# Patient Record
Sex: MALE | Race: WHITE | NOT HISPANIC OR LATINO | Employment: FULL TIME | URBAN - METROPOLITAN AREA
[De-identification: names, ages, dates, MRNs, and addresses within clinical notes are randomized per-mention and may not be internally consistent; named-entity substitution may affect disease eponyms.]

---

## 2019-02-11 ENCOUNTER — OFFICE VISIT (OUTPATIENT)
Dept: FAMILY MEDICINE | Facility: CLINIC | Age: 39
End: 2019-02-11
Payer: COMMERCIAL

## 2019-02-11 VITALS
SYSTOLIC BLOOD PRESSURE: 142 MMHG | DIASTOLIC BLOOD PRESSURE: 90 MMHG | BODY MASS INDEX: 36.27 KG/M2 | WEIGHT: 259.06 LBS | HEIGHT: 71 IN | TEMPERATURE: 98 F | HEART RATE: 74 BPM

## 2019-02-11 DIAGNOSIS — J30.89 SEASONAL AND PERENNIAL ALLERGIC RHINITIS: ICD-10-CM

## 2019-02-11 DIAGNOSIS — I10 ESSENTIAL HYPERTENSION: Primary | ICD-10-CM

## 2019-02-11 DIAGNOSIS — E66.01 CLASS 2 SEVERE OBESITY DUE TO EXCESS CALORIES WITH SERIOUS COMORBIDITY IN ADULT, UNSPECIFIED BMI: ICD-10-CM

## 2019-02-11 DIAGNOSIS — J30.2 SEASONAL AND PERENNIAL ALLERGIC RHINITIS: ICD-10-CM

## 2019-02-11 DIAGNOSIS — J45.990 EXERCISE-INDUCED ASTHMA: ICD-10-CM

## 2019-02-11 DIAGNOSIS — E78.5 DYSLIPIDEMIA: ICD-10-CM

## 2019-02-11 PROCEDURE — 99999 PR PBB SHADOW E&M-NEW PATIENT-LVL III: CPT | Mod: PBBFAC,,, | Performed by: INTERNAL MEDICINE

## 2019-02-11 PROCEDURE — 99204 OFFICE O/P NEW MOD 45 MIN: CPT | Mod: S$GLB,,, | Performed by: INTERNAL MEDICINE

## 2019-02-11 PROCEDURE — 99999 PR PBB SHADOW E&M-NEW PATIENT-LVL III: ICD-10-PCS | Mod: PBBFAC,,, | Performed by: INTERNAL MEDICINE

## 2019-02-11 PROCEDURE — 99204 PR OFFICE/OUTPT VISIT, NEW, LEVL IV, 45-59 MIN: ICD-10-PCS | Mod: S$GLB,,, | Performed by: INTERNAL MEDICINE

## 2019-02-11 RX ORDER — ACETAMINOPHEN 500 MG
500 TABLET ORAL EVERY 6 HOURS PRN
COMMUNITY
End: 2019-03-27

## 2019-02-11 RX ORDER — DIPHENHYDRAMINE HCL 25 MG
25 CAPSULE ORAL EVERY 6 HOURS PRN
COMMUNITY
End: 2019-03-27

## 2019-02-11 RX ORDER — LOSARTAN POTASSIUM 100 MG/1
1 TABLET ORAL DAILY
Refills: 0 | COMMUNITY
Start: 2019-01-12 | End: 2019-02-18 | Stop reason: SDUPTHER

## 2019-02-11 RX ORDER — ALBUTEROL SULFATE 90 UG/1
2 POWDER, METERED RESPIRATORY (INHALATION) DAILY PRN
COMMUNITY
Start: 2018-12-31 | End: 2020-04-03 | Stop reason: SDUPTHER

## 2019-02-11 NOTE — PATIENT INSTRUCTIONS
Essential hypertension. Maintain < 2 Gm Na a day diet, and monitor BP at home; keep a log. Cont losartan;   to check w pharmacist regarding refill if lot is acceptable.   -     Comprehensive metabolic panel; Future; Expected date: 02/11/2019  -     CBC auto differential; Future; Expected date: 02/11/2019  -     T4, free; Future; Expected date: 02/11/2019  -     TSH; Future; Expected date: 02/11/2019  -     HEMOGLOBIN A1C; Future; Expected date: 02/11/2019  -     Lipid panel; Future; Expected date: 02/11/2019  -     URINALYSIS; Future; Expected date: 02/11/2019  -     Magnesium; Future; Expected date: 02/11/2019    Dyslipidemia. Maintain low fat high fiber diet, exercise regularly.  -     Comprehensive metabolic panel; Future; Expected date: 02/11/2019  -     Lipid panel; Future; Expected date: 02/11/2019    Class 2 severe obesity due to excess calories with serious comorbidity in adult, unspecified BMI.   Caloric restriction w regular exercise and weight reduction.  -     Comprehensive metabolic panel; Future; Expected date: 02/11/2019  -     T4, free; Future; Expected date: 02/11/2019  -     TSH; Future; Expected date: 02/11/2019  -     HEMOGLOBIN A1C; Future; Expected date: 02/11/2019  -     Lipid panel; Future; Expected date: 02/11/2019    Exercise-induced asthma; 2 puffs of rescue inhaler before exercise daily as needed.    Seasonal and perennial allergic rhinitis; claritin 10 mg a day as needed for congestion; can't tolerate steroid nasal sprays due to nosebleeds.   Simply saline as needed for nasal irrigation.

## 2019-02-11 NOTE — PROGRESS NOTES
"Subjective:       Patient ID: Chad Chen is a 39 y.o. male.    Chief Complaint: No chief complaint on file.    HPI Here to get est [facility] as his PCP.  PMH/surgical hx delineated and noted. SMH/FMH also delineated and noted. ROS obtained at length prior to physical being performed.   HTN: BP at home runs 120/80 avr; manually 142/90. On losartan for BP.  Dyslipidemia; HDL runs in low 30's. On low fat high fiber diet.   Obesity; BMI 36.13. Exercises 4x a week w goal 5x a week. Had been at 7x a week. 45-60 min each time.   Total time: 220-305 pm; >50% time spent on discussion, counseling, and review. Labs ordered for f/u; meds reviewed as well. Losartan renewed.     Review of Systems   Constitutional: Negative for appetite change and unexpected weight change.   HENT: Negative for congestion, postnasal drip, rhinorrhea and sinus pressure.         Seasonal allergies, / perennial allergies   Eyes: Negative for discharge and itching.   Respiratory: Negative for cough, chest tightness, shortness of breath and wheezing.    Cardiovascular: Negative for chest pain, palpitations and leg swelling.   Gastrointestinal: Negative for abdominal pain, blood in stool, constipation, diarrhea, nausea and vomiting.   Endocrine: Negative for polydipsia, polyphagia and polyuria.   Genitourinary: Negative for dysuria and hematuria.   Musculoskeletal: Negative for arthralgias and myalgias.   Skin: Negative for rash.   Allergic/Immunologic: Positive for environmental allergies. Negative for food allergies.   Neurological: Negative for tremors, seizures and headaches.   Hematological: Negative for adenopathy. Does not bruise/bleed easily.   Psychiatric/Behavioral:        Denies anxiety or depression.       Objective:      Vitals:    02/11/19 1345   BP: (!) 142/90   Pulse: 74   Temp: 98.4 °F (36.9 °C)   Weight: 117.5 kg (259 lb 0.7 oz)   Height: 5' 11" (1.803 m)     Body mass index is 36.13 kg/m².    Physical Exam   Constitutional: He is oriented to " person, place, and time. He appears well-developed and well-nourished.   HENT:   Head: Normocephalic and atraumatic.   Throat pink and moist; TM's pink; NM swollen, inflamed w clear to white mucus.   Eyes: EOM are normal.   Neck: Normal range of motion. Neck supple. No thyromegaly present.   No carotid bruits heard.   Cardiovascular: Normal rate, regular rhythm and normal heart sounds. Exam reveals no gallop.   No murmur heard.  Pulmonary/Chest: Effort normal and breath sounds normal. No respiratory distress. He has no wheezes. He has no rales.   Abdominal: Soft. Bowel sounds are normal. He exhibits no distension. There is no tenderness. There is no rebound and no guarding.   Musculoskeletal: Normal range of motion. He exhibits no edema.   Lymphadenopathy:     He has no cervical adenopathy.   Neurological: He is alert and oriented to person, place, and time.   Moves all 4 extremities fine.   Skin: No rash noted.   Psychiatric: He has a normal mood and affect. His behavior is normal. Thought content normal.   Vitals reviewed.      Assessment:       1. Essential hypertension    2. Dyslipidemia    3. Class 2 severe obesity due to excess calories with serious comorbidity in adult, unspecified BMI    4. Exercise-induced asthma    5. Seasonal and perennial allergic rhinitis        Plan:       Essential hypertension. Maintain < 2 Gm Na a day diet, and monitor BP at home; keep a log. Cont losartan;   to check w pharmacist regarding refill if lot is acceptable.   -     Comprehensive metabolic panel; Future; Expected date: 02/11/2019  -     CBC auto differential; Future; Expected date: 02/11/2019  -     T4, free; Future; Expected date: 02/11/2019  -     TSH; Future; Expected date: 02/11/2019  -     HEMOGLOBIN A1C; Future; Expected date: 02/11/2019  -     Lipid panel; Future; Expected date: 02/11/2019  -     URINALYSIS; Future; Expected date: 02/11/2019  -     Magnesium; Future; Expected date: 02/11/2019    Dyslipidemia.  Maintain low fat high fiber diet, exercise regularly.  -     Comprehensive metabolic panel; Future; Expected date: 02/11/2019  -     Lipid panel; Future; Expected date: 02/11/2019    Class 2 severe obesity due to excess calories with serious comorbidity in adult, unspecified BMI.   Caloric restriction w regular exercise and weight reduction.  -     Comprehensive metabolic panel; Future; Expected date: 02/11/2019  -     T4, free; Future; Expected date: 02/11/2019  -     TSH; Future; Expected date: 02/11/2019  -     HEMOGLOBIN A1C; Future; Expected date: 02/11/2019  -     Lipid panel; Future; Expected date: 02/11/2019    Exercise-induced asthma; 2 puffs of rescue inhaler before exercise daily as needed.    Seasonal and perennial allergic rhinitis; claritin 10 mg a day as needed for congestion; can't tolerate steroid nasal sprays due to nosebleeds.   Simply saline as needed for nasal irrigation.

## 2019-02-18 NOTE — TELEPHONE ENCOUNTER
----- Message from Jena Espinoza sent at 2/18/2019 11:31 AM CST -----  Contact: self  Type:  RX Refill Request    Who Called:  self  Refill or New Rx:  new  RX Name and Strength:  losartan (COZAAR) 100 MG tablet  How is the patient currently taking it? (ex. 1XDay):  1Xday  Is this a 30 day or 90 day RX:  90  Preferred Pharmacy with phone number:  [facility]  Local or Mail Order:  local  Ordering Provider:  Dr Napier  [facility] Call Back Number:  [phone]  Additional Information:  Patient states pharmacy never received request for medication. Please call patient. Thanks!   [facility] [address] [place] [address] [address] [place] [address] AT [facility]  [address] [place] [id number]  Phone: [phone] Fax: [phone]

## 2019-02-20 RX ORDER — LOSARTAN POTASSIUM 100 MG/1
100 TABLET ORAL DAILY
Qty: 90 TABLET | Refills: 1 | Status: SHIPPED | OUTPATIENT
Start: 2019-02-20 | End: 2019-07-23 | Stop reason: SDUPTHER

## 2019-02-20 NOTE — TELEPHONE ENCOUNTER
Please ask patient that when he gets his Cozaar refilled to check with pharmacy to make sure that he does not get a lot that has been recalled; he will also need to do this when he gets it refilled.

## 2019-03-18 ENCOUNTER — PATIENT MESSAGE (OUTPATIENT)
Dept: FAMILY MEDICINE | Facility: CLINIC | Age: 39
End: 2019-03-18

## 2019-03-20 ENCOUNTER — LAB VISIT (OUTPATIENT)
Dept: LAB | Facility: HOSPITAL | Age: 39
End: 2019-03-20
Attending: INTERNAL MEDICINE
Payer: COMMERCIAL

## 2019-03-20 DIAGNOSIS — E66.01 CLASS 2 SEVERE OBESITY DUE TO EXCESS CALORIES WITH SERIOUS COMORBIDITY IN ADULT, UNSPECIFIED BMI: ICD-10-CM

## 2019-03-20 DIAGNOSIS — I10 ESSENTIAL HYPERTENSION: ICD-10-CM

## 2019-03-20 DIAGNOSIS — E78.5 DYSLIPIDEMIA: ICD-10-CM

## 2019-03-20 LAB
ALBUMIN SERPL BCP-MCNC: 3.9 G/DL
ALP SERPL-CCNC: 89 U/L
ALT SERPL W/O P-5'-P-CCNC: 35 U/L
ANION GAP SERPL CALC-SCNC: 9 MMOL/L
AST SERPL-CCNC: 20 U/L
BASOPHILS # BLD AUTO: 0.04 K/UL
BASOPHILS NFR BLD: 0.5 %
BILIRUB SERPL-MCNC: 0.8 MG/DL
BUN SERPL-MCNC: 15 MG/DL
CALCIUM SERPL-MCNC: 9.4 MG/DL
CHLORIDE SERPL-SCNC: 106 MMOL/L
CHOLEST SERPL-MCNC: 181 MG/DL
CHOLEST/HDLC SERPL: 5.5 {RATIO}
CO2 SERPL-SCNC: 27 MMOL/L
CREAT SERPL-MCNC: 0.8 MG/DL
DIFFERENTIAL METHOD: NORMAL
EOSINOPHIL # BLD AUTO: 0.2 K/UL
EOSINOPHIL NFR BLD: 2.6 %
ERYTHROCYTE [DISTWIDTH] IN BLOOD BY AUTOMATED COUNT: 13.4 %
EST. GFR  (AFRICAN AMERICAN): >60 ML/MIN/1.73 M^2
EST. GFR  (NON AFRICAN AMERICAN): >60 ML/MIN/1.73 M^2
ESTIMATED AVG GLUCOSE: 103 MG/DL
GLUCOSE SERPL-MCNC: 95 MG/DL
HBA1C MFR BLD HPLC: 5.2 %
HCT VFR BLD AUTO: 44.5 %
HDLC SERPL-MCNC: 33 MG/DL
HDLC SERPL: 18.2 %
HGB BLD-MCNC: 14.7 G/DL
IMM GRANULOCYTES # BLD AUTO: 0.02 K/UL
IMM GRANULOCYTES NFR BLD AUTO: 0.3 %
LDLC SERPL CALC-MCNC: 119.4 MG/DL
LYMPHOCYTES # BLD AUTO: 2.7 K/UL
LYMPHOCYTES NFR BLD: 36 %
MAGNESIUM SERPL-MCNC: 2 MG/DL
MCH RBC QN AUTO: 29.1 PG
MCHC RBC AUTO-ENTMCNC: 33 G/DL
MCV RBC AUTO: 88 FL
MONOCYTES # BLD AUTO: 0.5 K/UL
MONOCYTES NFR BLD: 7 %
NEUTROPHILS # BLD AUTO: 4 K/UL
NEUTROPHILS NFR BLD: 53.6 %
NONHDLC SERPL-MCNC: 148 MG/DL
NRBC BLD-RTO: 0 /100 WBC
PLATELET # BLD AUTO: 266 K/UL
PMV BLD AUTO: 9.9 FL
POTASSIUM SERPL-SCNC: 3.7 MMOL/L
PROT SERPL-MCNC: 6.8 G/DL
RBC # BLD AUTO: 5.05 M/UL
SODIUM SERPL-SCNC: 142 MMOL/L
T4 FREE SERPL-MCNC: 0.95 NG/DL
TRIGL SERPL-MCNC: 143 MG/DL
TSH SERPL DL<=0.005 MIU/L-ACNC: 1.18 UIU/ML
WBC # BLD AUTO: 7.39 K/UL

## 2019-03-20 PROCEDURE — 83036 HEMOGLOBIN GLYCOSYLATED A1C: CPT

## 2019-03-20 PROCEDURE — 84443 ASSAY THYROID STIM HORMONE: CPT

## 2019-03-20 PROCEDURE — 80061 LIPID PANEL: CPT

## 2019-03-20 PROCEDURE — 85025 COMPLETE CBC W/AUTO DIFF WBC: CPT

## 2019-03-20 PROCEDURE — 83735 ASSAY OF MAGNESIUM: CPT

## 2019-03-20 PROCEDURE — 80053 COMPREHEN METABOLIC PANEL: CPT

## 2019-03-20 PROCEDURE — 36415 COLL VENOUS BLD VENIPUNCTURE: CPT | Mod: PN

## 2019-03-20 PROCEDURE — 84439 ASSAY OF FREE THYROXINE: CPT

## 2019-03-27 ENCOUNTER — OFFICE VISIT (OUTPATIENT)
Dept: FAMILY MEDICINE | Facility: CLINIC | Age: 39
End: 2019-03-27
Payer: COMMERCIAL

## 2019-03-27 ENCOUNTER — PATIENT MESSAGE (OUTPATIENT)
Dept: FAMILY MEDICINE | Facility: CLINIC | Age: 39
End: 2019-03-27

## 2019-03-27 VITALS
RESPIRATION RATE: 16 BRPM | HEART RATE: 55 BPM | SYSTOLIC BLOOD PRESSURE: 150 MMHG | HEIGHT: 71 IN | BODY MASS INDEX: 35.51 KG/M2 | WEIGHT: 253.63 LBS | DIASTOLIC BLOOD PRESSURE: 90 MMHG | TEMPERATURE: 98 F | OXYGEN SATURATION: 97 %

## 2019-03-27 DIAGNOSIS — I10 ESSENTIAL HYPERTENSION: Primary | ICD-10-CM

## 2019-03-27 DIAGNOSIS — E78.5 DYSLIPIDEMIA: ICD-10-CM

## 2019-03-27 DIAGNOSIS — E66.01 CLASS 2 SEVERE OBESITY DUE TO EXCESS CALORIES WITH SERIOUS COMORBIDITY AND BODY MASS INDEX (BMI) OF 35.0 TO 35.9 IN ADULT: ICD-10-CM

## 2019-03-27 DIAGNOSIS — I10 WHITE COAT SYNDROME WITH DIAGNOSIS OF HYPERTENSION: ICD-10-CM

## 2019-03-27 DIAGNOSIS — J45.990 EXERCISE-INDUCED ASTHMA: ICD-10-CM

## 2019-03-27 DIAGNOSIS — E78.00 PURE HYPERCHOLESTEROLEMIA: ICD-10-CM

## 2019-03-27 PROCEDURE — 99999 PR PBB SHADOW E&M-EST. PATIENT-LVL III: CPT | Mod: PBBFAC,,, | Performed by: INTERNAL MEDICINE

## 2019-03-27 PROCEDURE — 99999 PR PBB SHADOW E&M-EST. PATIENT-LVL III: ICD-10-PCS | Mod: PBBFAC,,, | Performed by: INTERNAL MEDICINE

## 2019-03-27 PROCEDURE — 99213 PR OFFICE/OUTPT VISIT, EST, LEVL III, 20-29 MIN: ICD-10-PCS | Mod: S$GLB,,, | Performed by: INTERNAL MEDICINE

## 2019-03-27 PROCEDURE — 99213 OFFICE O/P EST LOW 20 MIN: CPT | Mod: S$GLB,,, | Performed by: INTERNAL MEDICINE

## 2019-03-27 NOTE — PROGRESS NOTES
"Subjective:       Patient ID: Chad Chen is a 39 y.o. male.    Chief Complaint: Hypertension (assessment of blood pressure and routine 6 week follow up )    HPI  Here for reassessment; and to go over his labs.  HTN: BP avr at home 120/80 vishal; here 150/90 manually. 147/90 by me on repeat; suspect white coat syn as well. Needs to decrease caffeine intake.   Dyslipidemia;hypercholesterolemia; on low fat high fiber diet. Regular exercise will help as well. Doing 5-7x a week. Anxiety if he doesn't. Counts steps; easily 10.000 a day steps. Will work on diet a little better.  Obesity; has lost around 6 lbs of note. BMI 35.38  EIA; has proair; uses 1x a week. Prolong exertion only time needed.    Review of Systems   Constitutional: Negative for activity change and unexpected weight change.   HENT: Negative for congestion, hearing loss, rhinorrhea and trouble swallowing.    Eyes: Negative for discharge and visual disturbance.   Respiratory: Negative for chest tightness and wheezing.    Cardiovascular: Negative for chest pain and palpitations.   Gastrointestinal: Negative for blood in stool, constipation, diarrhea and vomiting.   Endocrine: Negative for polydipsia and polyuria.   Genitourinary: Negative for difficulty urinating, hematuria and urgency.   Musculoskeletal: Positive for arthralgias and neck pain. Negative for joint swelling.   Skin: Negative for rash.   Neurological: Negative for weakness and headaches.   Psychiatric/Behavioral: Negative for confusion and dysphoric mood.        Denies anxiety or depression.       Objective:      Vitals:    03/27/19 0912   BP: (!) 150/90   Pulse: (!) 55   Resp: 16   Temp: 97.7 °F (36.5 °C)   TempSrc: Oral   SpO2: 97%   Weight: 115.1 kg (253 lb 10.2 oz)   Height: 5' 11" (1.803 m)     Body mass index is 35.38 kg/m².    Physical Exam   Constitutional: He is oriented to person, place, and time. He appears well-developed and well-nourished.   HENT:   Head: Normocephalic and atraumatic. "   Eyes: EOM are normal.   Neck: Normal range of motion. Neck supple. No thyromegaly present.   No carotid bruits   Cardiovascular: Normal rate, regular rhythm and normal heart sounds. Exam reveals no gallop.   No murmur heard.  Pulmonary/Chest: Effort normal and breath sounds normal. No respiratory distress. He has no wheezes. He has no rales.   Abdominal: Soft. Bowel sounds are normal. He exhibits no distension. There is no tenderness. There is no rebound and no guarding.   Musculoskeletal: Normal range of motion. He exhibits no edema.   Lymphadenopathy:     He has no cervical adenopathy.   Neurological: He is alert and oriented to person, place, and time.   Moves all 4 extremities fine.   Skin: No rash noted.   Psychiatric: He has a normal mood and affect. His behavior is normal. Thought content normal.   Vitals reviewed.      Assessment:       1. Essential hypertension    2. White coat syndrome with diagnosis of hypertension    3. Pure hypercholesterolemia    4. Dyslipidemia    5. Exercise-induced asthma    6. Class 2 severe obesity due to excess calories with serious comorbidity and body mass index (BMI) of 35.0 to 35.9 in adult        Plan:     Essential hypertension.Maintain < 2 Gm Na a day diet, and monitor BP at home; keep a log. Cont losartan.   -     Comprehensive metabolic panel; Future; Expected date: 03/27/2019  -     Lipid panel; Future; Expected date: 03/27/2019  -     Magnesium; Future; Expected date: 03/27/2019    White coat syndrome with diagnosis of hypertension    Pure hypercholesterolemia.Maintain low fat high fiber diet, exercise regularly. On omeg-3 oil; weight reduction as well.  -     Comprehensive metabolic panel; Future; Expected date: 03/27/2019  -     Lipid panel; Future; Expected date: 03/27/2019    Dyslipidemia; as above.   -     Comprehensive metabolic panel; Future; Expected date: 03/27/2019  -     Lipid panel; Future; Expected date: 03/27/2019    Exercise induced asthma; use  albuterol rescue as needed and before any heavy exertion anticipated.     Class 2 severe obesity due to excess calories with serious comorbidity and body mass index (BMI) of 35.0 to 35.9 in adult. Caloric restriction w regular exercise and weight reduction.

## 2019-03-27 NOTE — PATIENT INSTRUCTIONS
Essential hypertension.Maintain < 2 Gm Na a day diet, and monitor BP at home; keep a log. Cont losartan.   -     Comprehensive metabolic panel; Future; Expected date: 03/27/2019  -     Lipid panel; Future; Expected date: 03/27/2019  -     Magnesium; Future; Expected date: 03/27/2019    White coat syndrome with diagnosis of hypertension    Pure hypercholesterolemia.Maintain low fat high fiber diet, exercise regularly. On omeg-3 oil; weight reduction as well.  -     Comprehensive metabolic panel; Future; Expected date: 03/27/2019  -     Lipid panel; Future; Expected date: 03/27/2019    Dyslipidemia; as above.   -     Comprehensive metabolic panel; Future; Expected date: 03/27/2019  -     Lipid panel; Future; Expected date: 03/27/2019    Class 2 severe obesity due to excess calories with serious comorbidity and body mass index (BMI) of 35.0 to 35.9 in adult.Caloric restriction w regular exercise and weight reduction.    Exercise induced asthma; use albuterol rescue as needed and before any heavy exertion anticipated.

## 2019-03-28 NOTE — TELEPHONE ENCOUNTER
Notify patient I have received his blood pressure log and shows the most part adequate blood pressure control.  Please keep his follow-up for reassessment and further evaluation

## 2019-05-08 ENCOUNTER — PATIENT MESSAGE (OUTPATIENT)
Dept: FAMILY MEDICINE | Facility: CLINIC | Age: 39
End: 2019-05-08

## 2019-05-11 ENCOUNTER — TELEPHONE (OUTPATIENT)
Dept: FAMILY MEDICINE | Facility: CLINIC | Age: 39
End: 2019-05-11

## 2019-05-11 NOTE — TELEPHONE ENCOUNTER
Discussed case with patient by phone.  Reiterated that at the time of my physical exam I did not hear any evidence of a cardiac murmur.  He does have under past medical history though cardiac murmur listed. He states that he has not had an echocardiogram though; he has no hx of chest pain, SOB, or palpitations.  Offered sending him to see a cardiologist if desired however felt he could wait on this and he concurred; and prefer for 1st to review his old records; please try to obtain a copy of his old records from his PCP prior for review.

## 2019-07-11 ENCOUNTER — TELEPHONE (OUTPATIENT)
Dept: FAMILY MEDICINE | Facility: CLINIC | Age: 39
End: 2019-07-11

## 2019-07-11 NOTE — TELEPHONE ENCOUNTER
----- Message from Aaliyah Ramirez sent at 7/11/2019  4:26 PM CDT -----  Contact: PT   Pt calling in to schedule his mid year bp check , next available was showing September .   Pt would like to be seen before that.     Pt can be reached at [phone]    Thanks

## 2019-07-18 ENCOUNTER — LAB VISIT (OUTPATIENT)
Dept: LAB | Facility: HOSPITAL | Age: 39
End: 2019-07-18
Attending: INTERNAL MEDICINE
Payer: COMMERCIAL

## 2019-07-18 DIAGNOSIS — E78.5 DYSLIPIDEMIA: ICD-10-CM

## 2019-07-18 DIAGNOSIS — E78.00 PURE HYPERCHOLESTEROLEMIA: ICD-10-CM

## 2019-07-18 DIAGNOSIS — I10 ESSENTIAL HYPERTENSION: ICD-10-CM

## 2019-07-18 LAB
ALBUMIN SERPL BCP-MCNC: 3.8 G/DL (ref 3.5–5.2)
ALP SERPL-CCNC: 94 U/L (ref 55–135)
ALT SERPL W/O P-5'-P-CCNC: 30 U/L (ref 10–44)
ANION GAP SERPL CALC-SCNC: 10 MMOL/L (ref 8–16)
AST SERPL-CCNC: 19 U/L (ref 10–40)
BILIRUB SERPL-MCNC: 0.7 MG/DL (ref 0.1–1)
BUN SERPL-MCNC: 16 MG/DL (ref 6–20)
CALCIUM SERPL-MCNC: 9.3 MG/DL (ref 8.7–10.5)
CHLORIDE SERPL-SCNC: 105 MMOL/L (ref 95–110)
CHOLEST SERPL-MCNC: 191 MG/DL (ref 120–199)
CHOLEST/HDLC SERPL: 4.8 {RATIO} (ref 2–5)
CO2 SERPL-SCNC: 28 MMOL/L (ref 23–29)
CREAT SERPL-MCNC: 0.9 MG/DL (ref 0.5–1.4)
EST. GFR  (AFRICAN AMERICAN): >60 ML/MIN/1.73 M^2
EST. GFR  (NON AFRICAN AMERICAN): >60 ML/MIN/1.73 M^2
GLUCOSE SERPL-MCNC: 89 MG/DL (ref 70–110)
HDLC SERPL-MCNC: 40 MG/DL (ref 40–75)
HDLC SERPL: 20.9 % (ref 20–50)
LDLC SERPL CALC-MCNC: 111 MG/DL (ref 63–159)
MAGNESIUM SERPL-MCNC: 2 MG/DL (ref 1.6–2.6)
NONHDLC SERPL-MCNC: 151 MG/DL
POTASSIUM SERPL-SCNC: 3.5 MMOL/L (ref 3.5–5.1)
PROT SERPL-MCNC: 6.9 G/DL (ref 6–8.4)
SODIUM SERPL-SCNC: 143 MMOL/L (ref 136–145)
TRIGL SERPL-MCNC: 200 MG/DL (ref 30–150)

## 2019-07-18 PROCEDURE — 36415 COLL VENOUS BLD VENIPUNCTURE: CPT | Mod: PN

## 2019-07-18 PROCEDURE — 83735 ASSAY OF MAGNESIUM: CPT

## 2019-07-18 PROCEDURE — 80061 LIPID PANEL: CPT

## 2019-07-18 PROCEDURE — 80053 COMPREHEN METABOLIC PANEL: CPT

## 2019-07-23 ENCOUNTER — OFFICE VISIT (OUTPATIENT)
Dept: FAMILY MEDICINE | Facility: CLINIC | Age: 39
End: 2019-07-23
Payer: COMMERCIAL

## 2019-07-23 VITALS
SYSTOLIC BLOOD PRESSURE: 150 MMHG | TEMPERATURE: 98 F | HEART RATE: 60 BPM | DIASTOLIC BLOOD PRESSURE: 90 MMHG | BODY MASS INDEX: 36.96 KG/M2 | WEIGHT: 264 LBS | HEIGHT: 71 IN | OXYGEN SATURATION: 96 %

## 2019-07-23 DIAGNOSIS — E78.2 MIXED HYPERLIPIDEMIA: ICD-10-CM

## 2019-07-23 DIAGNOSIS — E78.5 DYSLIPIDEMIA: ICD-10-CM

## 2019-07-23 DIAGNOSIS — J45.20 MILD INTERMITTENT ASTHMA, UNSPECIFIED WHETHER COMPLICATED: ICD-10-CM

## 2019-07-23 DIAGNOSIS — I10 ESSENTIAL HYPERTENSION: Primary | ICD-10-CM

## 2019-07-23 DIAGNOSIS — I10 WHITE COAT SYNDROME WITH DIAGNOSIS OF HYPERTENSION: ICD-10-CM

## 2019-07-23 DIAGNOSIS — E66.01 CLASS 2 SEVERE OBESITY DUE TO EXCESS CALORIES WITH SERIOUS COMORBIDITY AND BODY MASS INDEX (BMI) OF 36.0 TO 36.9 IN ADULT: ICD-10-CM

## 2019-07-23 PROCEDURE — 99214 PR OFFICE/OUTPT VISIT, EST, LEVL IV, 30-39 MIN: ICD-10-PCS | Mod: S$GLB,,, | Performed by: INTERNAL MEDICINE

## 2019-07-23 PROCEDURE — 99999 PR PBB SHADOW E&M-EST. PATIENT-LVL III: ICD-10-PCS | Mod: PBBFAC,,, | Performed by: INTERNAL MEDICINE

## 2019-07-23 PROCEDURE — 99214 OFFICE O/P EST MOD 30 MIN: CPT | Mod: S$GLB,,, | Performed by: INTERNAL MEDICINE

## 2019-07-23 PROCEDURE — 99999 PR PBB SHADOW E&M-EST. PATIENT-LVL III: CPT | Mod: PBBFAC,,, | Performed by: INTERNAL MEDICINE

## 2019-07-23 RX ORDER — POTASSIUM CHLORIDE 750 MG/1
CAPSULE, EXTENDED RELEASE ORAL
Qty: 30 CAPSULE | Refills: 5 | Status: SHIPPED | OUTPATIENT
Start: 2019-07-23 | End: 2019-11-19

## 2019-07-23 RX ORDER — POTASSIUM CHLORIDE 750 MG/1
10 CAPSULE, EXTENDED RELEASE ORAL DAILY
Qty: 90 CAPSULE | Refills: 1 | Status: SHIPPED | OUTPATIENT
Start: 2019-07-23 | End: 2019-07-23

## 2019-07-23 RX ORDER — MELOXICAM 15 MG/1
TABLET ORAL
Refills: 0 | COMMUNITY
Start: 2019-07-05 | End: 2019-08-23

## 2019-07-23 RX ORDER — LOSARTAN POTASSIUM 100 MG/1
100 TABLET ORAL DAILY
Qty: 90 TABLET | Refills: 1 | Status: SHIPPED | OUTPATIENT
Start: 2019-07-23 | End: 2019-07-23

## 2019-07-23 RX ORDER — LOSARTAN POTASSIUM 100 MG/1
TABLET ORAL
Qty: 30 TABLET | Refills: 5 | Status: SHIPPED | OUTPATIENT
Start: 2019-07-23 | End: 2019-11-19 | Stop reason: ALTCHOICE

## 2019-07-23 NOTE — PATIENT INSTRUCTIONS
Essential hypertension.Maintain < 2 Gm Na a day diet, and monitor BP at home; keep a log. Cont losartan. Renewed at #30 w 5 refill. Limit caffeine intake. On mobic 15 mg a day for 4 weeks per ortho for elbow trauma; soon to stop.   -     potassium chloride (MICRO-K) 10 MEQ CpSR; Take 1 capsule (10 mEq total) by mouth once daily.  Dispense: 30 capsule; Refill: 5  -     Basic metabolic panel; Future; Expected date: 07/23/2019    White coat syndrome with diagnosis of hypertension; limit caffeine as well.     Mixed hyperlipidemia.Maintain low fat high fiber diet, exercise regularly. Weight reduction.   -     Lipid panel; Future; Expected date: 07/23/2019    Dyslipidemia.Maintain low fat high fiber diet, exercise regularly.  -     Lipid panel; Future; Expected date: 07/23/2019    Mild intermittent asthma, unspecified whether complicated; has rescue inhaler; rarely used.     Class 2 severe obesity due to excess calories with serious comorbidity and body mass index (BMI) of 36.0 to 36.9 in adult.  Caloric restriction w regular exercise and weight reduction.

## 2019-07-23 NOTE — PROGRESS NOTES
"Subjective:       Patient ID: Chad Chen is a 39 y.o. male.    Chief Complaint: Hypertension (Follow up on blood pressure )    HPI  Here today for reassessment, and to go over his labs.     Review of Systems   Constitutional: Negative for appetite change and unexpected weight change.   HENT: Negative for congestion, postnasal drip and rhinorrhea.         Seasonal allergies   Respiratory: Negative for cough, chest tightness, shortness of breath and wheezing.    Cardiovascular: Negative for chest pain, palpitations and leg swelling.   Gastrointestinal: Negative for abdominal pain, constipation, nausea and vomiting.   Endocrine: Negative for polydipsia, polyphagia and polyuria.   Genitourinary: Negative for dysuria and urgency.   Musculoskeletal: Negative for arthralgias, myalgias and neck pain.   Skin: Negative for rash.   Allergic/Immunologic: Positive for environmental allergies. Negative for food allergies.        Doing well today   Neurological: Negative for tremors, syncope and headaches.   Hematological: Negative for adenopathy. Does not bruise/bleed easily.   Psychiatric/Behavioral:        Denies anxiety or depression.       Objective:      Vitals:    07/23/19 0846   BP: (!) 150/90   BP Location: Left arm   Patient Position: Sitting   BP Method: Large (Manual)   Pulse: 60   Temp: 98.3 °F (36.8 °C)   TempSrc: Oral   SpO2: 96%   Weight: 119.7 kg (264 lb)   Height: 5' 11" (1.803 m)     Body mass index is 36.82 kg/m².    Physical Exam   Constitutional: He is oriented to person, place, and time. He appears well-developed and well-nourished.   HENT:   Head: Normocephalic and atraumatic.   Eyes: EOM are normal.   Neck: Normal range of motion. Neck supple. No thyromegaly present.   No carotid bruits heard   Cardiovascular: Normal rate, regular rhythm and normal heart sounds. Exam reveals no gallop.   No murmur heard.  Pulmonary/Chest: Effort normal and breath sounds normal. No respiratory distress. He has no wheezes. He " has no rales.   Abdominal: Soft. Bowel sounds are normal. He exhibits no distension. There is no tenderness. There is no rebound and no guarding.   Musculoskeletal: Normal range of motion. He exhibits no edema.   Lymphadenopathy:     He has no cervical adenopathy.   Neurological: He is alert and oriented to person, place, and time.   Moves all 4 extremities fine.   Skin: No rash noted.   Psychiatric: He has a normal mood and affect. His behavior is normal. Thought content normal.   Vitals reviewed.      Assessment:       1. Essential hypertension    2. White coat syndrome with diagnosis of hypertension    3. Mixed hyperlipidemia    4. Dyslipidemia    5. Mild intermittent asthma, unspecified whether complicated    6. Class 2 severe obesity due to excess calories with serious comorbidity and body mass index (BMI) of 36.0 to 36.9 in adult        Plan:       Essential hypertension.Maintain < 2 Gm Na a day diet, and monitor BP at home; keep a log. Cont losartan. Renewed at #30 w 5 refill. Limit caffeine intake. On mobic 15 mg a day for 4 weeks per ortho for elbow trauma; soon to stop.   -     potassium chloride (MICRO-K) 10 MEQ CpSR; Take 1 capsule (10 mEq total) by mouth once daily.  Dispense: 30 capsule; Refill: 5  -     Basic metabolic panel; Future; Expected date: 07/23/2019    White coat syndrome with diagnosis of hypertension; limit caffeine as well.     Mixed hyperlipidemia.Maintain low fat high fiber diet, exercise regularly. Weight reduction.   -     Lipid panel; Future; Expected date: 07/23/2019    Dyslipidemia.Maintain low fat high fiber diet, exercise regularly.  -     Lipid panel; Future; Expected date: 07/23/2019    Mild intermittent asthma, unspecified whether complicated; has rescue inhaler; rarely used.     Class 2 severe obesity due to excess calories with serious comorbidity and body mass index (BMI) of 36.0 to 36.9 in adult.  Caloric restriction w regular exercise and weight reduction.

## 2019-08-23 ENCOUNTER — LAB VISIT (OUTPATIENT)
Dept: LAB | Facility: HOSPITAL | Age: 39
End: 2019-08-23
Attending: INTERNAL MEDICINE
Payer: COMMERCIAL

## 2019-08-23 ENCOUNTER — OFFICE VISIT (OUTPATIENT)
Dept: FAMILY MEDICINE | Facility: CLINIC | Age: 39
End: 2019-08-23
Payer: COMMERCIAL

## 2019-08-23 ENCOUNTER — PATIENT MESSAGE (OUTPATIENT)
Dept: FAMILY MEDICINE | Facility: CLINIC | Age: 39
End: 2019-08-23

## 2019-08-23 VITALS
DIASTOLIC BLOOD PRESSURE: 98 MMHG | HEART RATE: 92 BPM | WEIGHT: 263.31 LBS | HEIGHT: 71 IN | SYSTOLIC BLOOD PRESSURE: 148 MMHG | BODY MASS INDEX: 36.86 KG/M2 | RESPIRATION RATE: 18 BRPM | TEMPERATURE: 100 F

## 2019-08-23 DIAGNOSIS — R50.9 FEVER, UNSPECIFIED FEVER CAUSE: ICD-10-CM

## 2019-08-23 DIAGNOSIS — R61 NIGHT SWEATS: ICD-10-CM

## 2019-08-23 DIAGNOSIS — R59.1 LYMPHADENOPATHY: ICD-10-CM

## 2019-08-23 DIAGNOSIS — E78.2 MIXED HYPERLIPIDEMIA: ICD-10-CM

## 2019-08-23 DIAGNOSIS — I10 ESSENTIAL HYPERTENSION: ICD-10-CM

## 2019-08-23 DIAGNOSIS — R50.9 FEVER, UNSPECIFIED FEVER CAUSE: Primary | ICD-10-CM

## 2019-08-23 DIAGNOSIS — E78.5 DYSLIPIDEMIA: ICD-10-CM

## 2019-08-23 LAB
ANION GAP SERPL CALC-SCNC: 8 MMOL/L (ref 8–16)
ANION GAP SERPL CALC-SCNC: 8 MMOL/L (ref 8–16)
BASOPHILS # BLD AUTO: 0.07 K/UL (ref 0–0.2)
BASOPHILS NFR BLD: 0.6 % (ref 0–1.9)
BUN SERPL-MCNC: 12 MG/DL (ref 6–20)
BUN SERPL-MCNC: 12 MG/DL (ref 6–20)
CALCIUM SERPL-MCNC: 9.2 MG/DL (ref 8.7–10.5)
CALCIUM SERPL-MCNC: 9.2 MG/DL (ref 8.7–10.5)
CHLORIDE SERPL-SCNC: 103 MMOL/L (ref 95–110)
CHLORIDE SERPL-SCNC: 103 MMOL/L (ref 95–110)
CHOLEST SERPL-MCNC: 164 MG/DL (ref 120–199)
CHOLEST/HDLC SERPL: 5.1 {RATIO} (ref 2–5)
CO2 SERPL-SCNC: 28 MMOL/L (ref 23–29)
CO2 SERPL-SCNC: 28 MMOL/L (ref 23–29)
CREAT SERPL-MCNC: 1 MG/DL (ref 0.5–1.4)
CREAT SERPL-MCNC: 1 MG/DL (ref 0.5–1.4)
DIFFERENTIAL METHOD: ABNORMAL
EOSINOPHIL # BLD AUTO: 0.1 K/UL (ref 0–0.5)
EOSINOPHIL NFR BLD: 1.1 % (ref 0–8)
ERYTHROCYTE [DISTWIDTH] IN BLOOD BY AUTOMATED COUNT: 12.8 % (ref 11.5–14.5)
EST. GFR  (AFRICAN AMERICAN): >60 ML/MIN/1.73 M^2
EST. GFR  (AFRICAN AMERICAN): >60 ML/MIN/1.73 M^2
EST. GFR  (NON AFRICAN AMERICAN): >60 ML/MIN/1.73 M^2
EST. GFR  (NON AFRICAN AMERICAN): >60 ML/MIN/1.73 M^2
GLUCOSE SERPL-MCNC: 100 MG/DL (ref 70–110)
GLUCOSE SERPL-MCNC: 100 MG/DL (ref 70–110)
HCT VFR BLD AUTO: 44.6 % (ref 40–54)
HDLC SERPL-MCNC: 32 MG/DL (ref 40–75)
HDLC SERPL: 19.5 % (ref 20–50)
HGB BLD-MCNC: 14.5 G/DL (ref 14–18)
IMM GRANULOCYTES # BLD AUTO: 0.04 K/UL (ref 0–0.04)
IMM GRANULOCYTES NFR BLD AUTO: 0.3 % (ref 0–0.5)
LDH SERPL L TO P-CCNC: 281 U/L (ref 110–260)
LDLC SERPL CALC-MCNC: 96.2 MG/DL (ref 63–159)
LYMPHOCYTES # BLD AUTO: 2.7 K/UL (ref 1–4.8)
LYMPHOCYTES NFR BLD: 21.9 % (ref 18–48)
MCH RBC QN AUTO: 29 PG (ref 27–31)
MCHC RBC AUTO-ENTMCNC: 32.5 G/DL (ref 32–36)
MCV RBC AUTO: 89 FL (ref 82–98)
MONOCYTES # BLD AUTO: 1.6 K/UL (ref 0.3–1)
MONOCYTES NFR BLD: 12.9 % (ref 4–15)
NEUTROPHILS # BLD AUTO: 7.8 K/UL (ref 1.8–7.7)
NEUTROPHILS NFR BLD: 63.2 % (ref 38–73)
NONHDLC SERPL-MCNC: 132 MG/DL
NRBC BLD-RTO: 0 /100 WBC
PLATELET # BLD AUTO: 261 K/UL (ref 150–350)
PMV BLD AUTO: 9.5 FL (ref 9.2–12.9)
POTASSIUM SERPL-SCNC: 3.5 MMOL/L (ref 3.5–5.1)
POTASSIUM SERPL-SCNC: 3.5 MMOL/L (ref 3.5–5.1)
RBC # BLD AUTO: 5 M/UL (ref 4.6–6.2)
SODIUM SERPL-SCNC: 139 MMOL/L (ref 136–145)
SODIUM SERPL-SCNC: 139 MMOL/L (ref 136–145)
TRIGL SERPL-MCNC: 179 MG/DL (ref 30–150)
WBC # BLD AUTO: 12.28 K/UL (ref 3.9–12.7)

## 2019-08-23 PROCEDURE — 99999 PR PBB SHADOW E&M-EST. PATIENT-LVL IV: CPT | Mod: PBBFAC,,, | Performed by: INTERNAL MEDICINE

## 2019-08-23 PROCEDURE — 96372 THER/PROPH/DIAG INJ SC/IM: CPT | Mod: S$GLB,,, | Performed by: INTERNAL MEDICINE

## 2019-08-23 PROCEDURE — 36415 COLL VENOUS BLD VENIPUNCTURE: CPT | Mod: PN

## 2019-08-23 PROCEDURE — 83615 LACTATE (LD) (LDH) ENZYME: CPT

## 2019-08-23 PROCEDURE — 80048 BASIC METABOLIC PNL TOTAL CA: CPT

## 2019-08-23 PROCEDURE — 96372 PR INJECTION,THERAP/PROPH/DIAG2ST, IM OR SUBCUT: ICD-10-PCS | Mod: S$GLB,,, | Performed by: INTERNAL MEDICINE

## 2019-08-23 PROCEDURE — 99999 PR PBB SHADOW E&M-EST. PATIENT-LVL IV: ICD-10-PCS | Mod: PBBFAC,,, | Performed by: INTERNAL MEDICINE

## 2019-08-23 PROCEDURE — 99214 OFFICE O/P EST MOD 30 MIN: CPT | Mod: 25,S$GLB,, | Performed by: INTERNAL MEDICINE

## 2019-08-23 PROCEDURE — 99214 PR OFFICE/OUTPT VISIT, EST, LEVL IV, 30-39 MIN: ICD-10-PCS | Mod: 25,S$GLB,, | Performed by: INTERNAL MEDICINE

## 2019-08-23 PROCEDURE — 86703 HIV-1/HIV-2 1 RESULT ANTBDY: CPT

## 2019-08-23 PROCEDURE — 80061 LIPID PANEL: CPT

## 2019-08-23 PROCEDURE — 85025 COMPLETE CBC W/AUTO DIFF WBC: CPT

## 2019-08-23 RX ORDER — CEFDINIR 300 MG/1
300 CAPSULE ORAL 2 TIMES DAILY
Refills: 0 | COMMUNITY
Start: 2019-08-20 | End: 2019-10-16

## 2019-08-23 RX ORDER — KETOROLAC TROMETHAMINE 30 MG/ML
60 INJECTION, SOLUTION INTRAMUSCULAR; INTRAVENOUS
Status: COMPLETED | OUTPATIENT
Start: 2019-08-23 | End: 2019-08-23

## 2019-08-23 RX ORDER — IBUPROFEN 800 MG/1
800 TABLET ORAL 3 TIMES DAILY
Qty: 30 TABLET | Refills: 0 | Status: SHIPPED | OUTPATIENT
Start: 2019-08-23 | End: 2019-08-30 | Stop reason: SDUPTHER

## 2019-08-23 RX ADMIN — KETOROLAC TROMETHAMINE 60 MG: 30 INJECTION, SOLUTION INTRAMUSCULAR; INTRAVENOUS at 10:08

## 2019-08-23 NOTE — PROGRESS NOTES
Assessment and Plan:    1. Fever, unspecified fever cause  - Basic metabolic panel; Future  - CBC auto differential; Future  - Lactate dehydrogenase; Future  - HIV 1/2 Ag/Ab (4th Gen); Future  - US Soft Tissue Axilla; Future  - ketorolac injection 60 mg  - ibuprofen (ADVIL,MOTRIN) 800 MG tablet; Take 1 tablet (800 mg total) by mouth 3 (three) times daily.  Dispense: 30 tablet; Refill: 0    2. Night sweats  - Basic metabolic panel; Future  - CBC auto differential; Future  - Lactate dehydrogenase; Future  - HIV 1/2 Ag/Ab (4th Gen); Future  - US Soft Tissue Axilla; Future  - ketorolac injection 60 mg  - ibuprofen (ADVIL,MOTRIN) 800 MG tablet; Take 1 tablet (800 mg total) by mouth 3 (three) times daily.  Dispense: 30 tablet; Refill: 0    3. Lymphadenopathy  - Basic metabolic panel; Future  - CBC auto differential; Future  - Lactate dehydrogenase; Future  - HIV 1/2 Ag/Ab (4th Gen); Future  - US Soft Tissue Axilla; Future  - ketorolac injection 60 mg  - ibuprofen (ADVIL,MOTRIN) 800 MG tablet; Take 1 tablet (800 mg total) by mouth 3 (three) times daily.  Dispense: 30 tablet; Refill: 0    Patient presenting with left sided tender axillary LAD x 1.5 weeks, now also with fever and night sweats. Previous CBC and CXR negative. Possible reactive to small cut on left hand but laceration is healing well and has no signs of infection or inflammation. Already on PO antibiotics. Will evaluate with labs, US, possible biopsy of LN if atypical features on US or not resolving over the next 1-2 weeks. NSAIDs for pain and swelling.    4. Essential hypertension  Elevated today and for the last few days on home readings, likely 2/2 pain and current illness. Also has known white coat syndrome with dx of HTN. Discussed continuing losartan, avoiding salt/caffeine, and nurse visit BP check with home log review in 2 weeks.    ______________________________________________________________________  Subjective:    Chief Complaint:  ER follow  up    HPI:  Chad is a 39 y.o. year old man here as a walk in patient for ER follow up. He is new to me.    He had gone to ER on 8/171/9 for axillary LAD. Had CBC and CXR which were both normal. He reports that since then he has developed neck pain, back pain, and fever since 8/19, and night sweats just last night. He had also gone to an urgent care about this on 8/20/19 and was prescribed cefdinir for presumed sinus infection. He has been on this since 8/20 with no improvement on this. He has been taking ibuprofen for the fever and pain.     He reports that he had cut his left hand a week and half before the lymph node flared up, but no other issues on the left arm. Has an ingrown hair on his left sided chest that he thinks looks a little inflamed.     His mother had 3 separate primary breast cancers, but he reports that she had genetic testing which was negative.    Medications:  Current Outpatient Medications on File Prior to Visit   Medication Sig Dispense Refill    cefdinir (OMNICEF) 300 MG capsule Take 300 mg by mouth 2 (two) times daily.  0    losartan (COZAAR) 100 MG tablet 100 mg po each morning 30 tablet 5    om 3/E/linol/ala/oleic/gla/lip (OMEGA 3-6-9 ORAL) Take 1 capsule by mouth once daily.      potassium chloride (MICRO-K) 10 MEQ CpSR 10 meq po each morning. 30 capsule 5    PROAIR RESPICLICK 90 mcg/actuation AePB Inhale 2 puffs into the lungs daily as needed.      [DISCONTINUED] meloxicam (MOBIC) 15 MG tablet TK 1 T PO QD  0     No current facility-administered medications on file prior to visit.        Review of Systems:  Review of Systems   Constitutional: Positive for fever.   HENT: Negative for congestion, ear pain and sore throat.    Respiratory: Negative for cough and wheezing.    Cardiovascular: Negative for chest pain.   Gastrointestinal: Negative for abdominal pain, diarrhea, nausea and vomiting.   Genitourinary: Negative for dysuria.   Skin: Negative for rash.   Neurological: Positive for  "headaches.     Entered by patient and reviewed and updated during visit      Past Medical History:  Past Medical History:   Diagnosis Date    Asthma     exercise induced    Cardiac murmur     Dyslipidemia     Hypertension     Obesity     Seasonal and perennial allergic rhinitis        Objective:    Vitals:  Vitals:    08/23/19 1002 08/23/19 1035   BP: (!) 144/100 (!) 148/98   Pulse: 92    Resp: 18    Temp: 99.5 °F (37.5 °C)    TempSrc: Oral    Weight: 119.4 kg (263 lb 5.4 oz)    Height: 5' 11" (1.803 m)    PainSc:   7    PainLoc: Arm        Physical Exam   Constitutional: He is oriented to person, place, and time. He appears well-developed and well-nourished. No distress.   HENT:   Mouth/Throat: Oropharynx is clear and moist.   Eyes: Conjunctivae are normal.   Cardiovascular: Normal rate and regular rhythm.   Pulmonary/Chest: Effort normal. No respiratory distress. He exhibits no mass. Right breast exhibits no inverted nipple, no mass, no nipple discharge, no skin change and no tenderness. Left breast exhibits no inverted nipple, no mass, no nipple discharge, no skin change and no tenderness. Breasts are symmetrical.   Lymphadenopathy:     He has axillary adenopathy.   left sided large (~size of an egg) swollen and very tender LN in lower anterior axillary region   Neurological: He is alert and oriented to person, place, and time.   Skin: Skin is warm and dry.   Psychiatric: He has a normal mood and affect. His behavior is normal.   Vitals reviewed.      Data:  Previous labs reviewed and pertinent for CBC and CXR in ER normal.      Bronwyn Brandt MD  Internal Medicine  "

## 2019-08-23 NOTE — TELEPHONE ENCOUNTER
Please advise.  Pt was seen today in office wanting to know if he should continue his antibiotic.

## 2019-08-26 ENCOUNTER — HOSPITAL ENCOUNTER (OUTPATIENT)
Dept: RADIOLOGY | Facility: HOSPITAL | Age: 39
Discharge: HOME | End: 2019-08-26
Attending: INTERNAL MEDICINE
Payer: COMMERCIAL

## 2019-08-26 ENCOUNTER — PATIENT MESSAGE (OUTPATIENT)
Dept: FAMILY MEDICINE | Facility: CLINIC | Age: 39
End: 2019-08-26

## 2019-08-26 ENCOUNTER — TELEPHONE (OUTPATIENT)
Dept: FAMILY MEDICINE | Facility: CLINIC | Age: 39
End: 2019-08-26

## 2019-08-26 DIAGNOSIS — R50.9 FEVER, UNSPECIFIED FEVER CAUSE: Primary | ICD-10-CM

## 2019-08-26 DIAGNOSIS — R74.02 ELEVATED LDH: ICD-10-CM

## 2019-08-26 DIAGNOSIS — R50.9 FEVER, UNSPECIFIED FEVER CAUSE: ICD-10-CM

## 2019-08-26 DIAGNOSIS — R61 NIGHT SWEATS: ICD-10-CM

## 2019-08-26 DIAGNOSIS — R59.0 ENLARGED LYMPH NODES IN ARMPIT: ICD-10-CM

## 2019-08-26 DIAGNOSIS — R59.1 LYMPHADENOPATHY: ICD-10-CM

## 2019-08-26 LAB — HIV 1+2 AB+HIV1 P24 AG SERPL QL IA: NEGATIVE

## 2019-08-26 PROCEDURE — 76882 US LMTD JT/FCL EVL NVASC XTR: CPT | Mod: 26,,, | Performed by: RADIOLOGY

## 2019-08-26 PROCEDURE — 76882 US SOFT TISSUE AXILLA: ICD-10-PCS | Mod: 26,,, | Performed by: RADIOLOGY

## 2019-08-26 PROCEDURE — 76882 US LMTD JT/FCL EVL NVASC XTR: CPT | Mod: TC,PO

## 2019-08-26 NOTE — TELEPHONE ENCOUNTER
Spoke w/ pt. He states these labs were added to his orders from Dr Brandt and were supposed to be for October. He was not fasting. Offered to sched his f/u appt and reorder these for October. Pt states he is currently being worked up for lymphoma and will call back to schedule this routine lab/appt w/ Dr Napier once all of this has been settled. Advised pt we will let Dr Napier know and to call back when ready to schedule.

## 2019-08-26 NOTE — TELEPHONE ENCOUNTER
----- Message from Paulino Napier MD sent at 8/24/2019  9:08 AM CDT -----  Notify patient with received his lab results there some minor abnormalities.  Triglyceride is elevated at 179 and his He needs to be on a low-fat high-fiber diet and exercise regularly fasting blood sugars borderline at 100.  I do not see to he is scheduled a follow-up appointment yet.  Please have him schedule follow-up appointment with us to go over results in more detail

## 2019-08-26 NOTE — TELEPHONE ENCOUNTER
Please notify patient that Dr. Brandt and I discussed his management today; agree with the workup that she is doing and his need to obtain CT scan of chest, abdomen, and pelvis with contrast, along with lymph node biopsy, as the most immediate neck is 2 steps to be performed.  Once he has completed his workup we can pursue his other labs at a later date; hope all comes out well.  Tried to reach patient by phone got a recording and left a message that I had called in at Dr. Brandt and I had talked

## 2019-08-26 NOTE — TELEPHONE ENCOUNTER
Discussed with ; agree with CT of the chest abdomen and pelvis with and without IV contrast for further evaluation of ultrasound imaging of the axilla showing multiple enlarged axillary abnormal lymph nodes in left axilla in a patient with with mildly elevated LDH; need to rule out neoplastic process; also agree with obtaining Heme-Onc consult as soon as possible; agree with plan of care; reportedly no signs of infection noted on clinical exam by Dr. Brandt

## 2019-08-26 NOTE — TELEPHONE ENCOUNTER
Ct scheduled, awaiting call from Kimberly to schedule biopsy. Please call pt to schedule hem/onc for first available appt.

## 2019-08-26 NOTE — TELEPHONE ENCOUNTER
Please see if there is any way we can get his US changed to today? I really need that piece of information to know what to do next.

## 2019-08-26 NOTE — TELEPHONE ENCOUNTER
Patient with rapidly enlarging tender LAD in left axilla, morphologically abnormal LNs on US, labs notable for elevated LDH. Spoke with patient about results so far. Please schedule Heme/Onc evaluation ASAP, biopsy and CT also ASAP. Patient would like first available for everything, schedule is open. Please call him to confirm times.

## 2019-08-27 ENCOUNTER — HOSPITAL ENCOUNTER (OUTPATIENT)
Dept: RADIOLOGY | Facility: HOSPITAL | Age: 39
Discharge: HOME | End: 2019-08-27
Attending: INTERNAL MEDICINE
Payer: COMMERCIAL

## 2019-08-27 ENCOUNTER — PATIENT MESSAGE (OUTPATIENT)
Dept: FAMILY MEDICINE | Facility: CLINIC | Age: 39
End: 2019-08-27

## 2019-08-27 DIAGNOSIS — R59.1 LYMPHADENOPATHY: Primary | ICD-10-CM

## 2019-08-27 DIAGNOSIS — R59.0 ENLARGED LYMPH NODES IN ARMPIT: ICD-10-CM

## 2019-08-27 DIAGNOSIS — R61 NIGHT SWEATS: ICD-10-CM

## 2019-08-27 DIAGNOSIS — M79.622 AXILLARY PAIN, LEFT: ICD-10-CM

## 2019-08-27 DIAGNOSIS — R50.9 FEVER, UNSPECIFIED FEVER CAUSE: ICD-10-CM

## 2019-08-27 DIAGNOSIS — R74.02 ELEVATED LDH: ICD-10-CM

## 2019-08-27 PROCEDURE — 71260 CT THORAX DX C+: CPT | Mod: 26,,, | Performed by: RADIOLOGY

## 2019-08-27 PROCEDURE — 74177 CT ABD & PELVIS W/CONTRAST: CPT | Mod: TC,PO

## 2019-08-27 PROCEDURE — 74177 CT ABD & PELVIS W/CONTRAST: CPT | Mod: 26,,, | Performed by: RADIOLOGY

## 2019-08-27 PROCEDURE — 74177 CT CHEST ABDOMEN PELVIS WITH CONTRAST (XPD): ICD-10-PCS | Mod: 26,,, | Performed by: RADIOLOGY

## 2019-08-27 PROCEDURE — 25500020 PHARM REV CODE 255: Mod: PO | Performed by: INTERNAL MEDICINE

## 2019-08-27 PROCEDURE — 71260 CT CHEST ABDOMEN PELVIS WITH CONTRAST (XPD): ICD-10-PCS | Mod: 26,,, | Performed by: RADIOLOGY

## 2019-08-27 RX ORDER — TRAMADOL HYDROCHLORIDE 50 MG/1
50 TABLET ORAL EVERY 6 HOURS PRN
Qty: 28 TABLET | Refills: 0 | Status: SHIPPED | OUTPATIENT
Start: 2019-08-27 | End: 2019-09-03

## 2019-08-27 RX ADMIN — IOHEXOL 30 ML: 350 INJECTION, SOLUTION INTRAVENOUS at 12:08

## 2019-08-27 RX ADMIN — IOHEXOL 100 ML: 350 INJECTION, SOLUTION INTRAVENOUS at 12:08

## 2019-08-27 NOTE — TELEPHONE ENCOUNTER
Spoke with patient about CT results. Will try tramadol for temporary pain control while we determine what is going on.

## 2019-08-29 ENCOUNTER — HOSPITAL ENCOUNTER (OUTPATIENT)
Dept: RADIOLOGY | Facility: HOSPITAL | Age: 39
Discharge: HOME | End: 2019-08-29
Attending: INTERNAL MEDICINE
Payer: COMMERCIAL

## 2019-08-29 DIAGNOSIS — R59.0 ENLARGED LYMPH NODES IN ARMPIT: ICD-10-CM

## 2019-08-29 PROCEDURE — 88184 FLOWCYTOMETRY/ TC 1 MARKER: CPT | Performed by: PATHOLOGY

## 2019-08-29 PROCEDURE — 88189 FLOWCYTOMETRY/READ 16 & >: CPT | Mod: ,,, | Performed by: PATHOLOGY

## 2019-08-29 PROCEDURE — 88305 TISSUE SPECIMEN TO PATHOLOGY, RADIOLOGY: ICD-10-PCS | Mod: 26,,, | Performed by: PATHOLOGY

## 2019-08-29 PROCEDURE — 76942 ECHO GUIDE FOR BIOPSY: CPT | Mod: 26,,, | Performed by: RADIOLOGY

## 2019-08-29 PROCEDURE — 88189 PR  FLOWCYTOMETRY/READ, 16 & > MARKERS: ICD-10-PCS | Mod: ,,, | Performed by: PATHOLOGY

## 2019-08-29 PROCEDURE — 27201068 US BIOPSY LYMPH NODES (XPD): Mod: PO

## 2019-08-29 PROCEDURE — 76942 US BIOPSY LYMPH NODES (XPD): ICD-10-PCS | Mod: 26,,, | Performed by: RADIOLOGY

## 2019-08-29 PROCEDURE — 88312 TISSUE SPECIMEN TO PATHOLOGY, RADIOLOGY: ICD-10-PCS | Mod: 26,,, | Performed by: PATHOLOGY

## 2019-08-29 PROCEDURE — 38505 US BIOPSY LYMPH NODES (XPD): ICD-10-PCS | Mod: ,,, | Performed by: RADIOLOGY

## 2019-08-29 PROCEDURE — 88342 IMHCHEM/IMCYTCHM 1ST ANTB: CPT | Mod: 59 | Performed by: PATHOLOGY

## 2019-08-29 PROCEDURE — 38505 NEEDLE BIOPSY LYMPH NODES: CPT | Mod: ,,, | Performed by: RADIOLOGY

## 2019-08-29 PROCEDURE — 88305 TISSUE EXAM BY PATHOLOGIST: CPT | Performed by: PATHOLOGY

## 2019-08-29 PROCEDURE — 88312 SPECIAL STAINS GROUP 1: CPT | Mod: 26,,, | Performed by: PATHOLOGY

## 2019-08-29 PROCEDURE — 88305 TISSUE EXAM BY PATHOLOGIST: CPT | Mod: 26,,, | Performed by: PATHOLOGY

## 2019-08-29 PROCEDURE — 88342 IMHCHEM/IMCYTCHM 1ST ANTB: CPT | Mod: 26,,, | Performed by: PATHOLOGY

## 2019-08-29 PROCEDURE — 25000003 PHARM REV CODE 250: Mod: PO | Performed by: INTERNAL MEDICINE

## 2019-08-29 PROCEDURE — 88342 TISSUE SPECIMEN TO PATHOLOGY, RADIOLOGY: ICD-10-PCS | Mod: 26,,, | Performed by: PATHOLOGY

## 2019-08-29 PROCEDURE — 88185 FLOWCYTOMETRY/TC ADD-ON: CPT | Mod: 59 | Performed by: PATHOLOGY

## 2019-08-29 RX ORDER — LIDOCAINE HYDROCHLORIDE 10 MG/ML
5 INJECTION INFILTRATION; PERINEURAL ONCE
Status: COMPLETED | OUTPATIENT
Start: 2019-08-29 | End: 2019-08-29

## 2019-08-29 RX ADMIN — LIDOCAINE HYDROCHLORIDE 5 ML: 10 INJECTION, SOLUTION EPIDURAL; INFILTRATION; INTRACAUDAL; PERINEURAL at 11:08

## 2019-08-30 ENCOUNTER — PATIENT MESSAGE (OUTPATIENT)
Dept: FAMILY MEDICINE | Facility: CLINIC | Age: 39
End: 2019-08-30

## 2019-08-30 DIAGNOSIS — R59.1 LYMPHADENOPATHY: ICD-10-CM

## 2019-08-30 DIAGNOSIS — R61 NIGHT SWEATS: ICD-10-CM

## 2019-08-30 DIAGNOSIS — R50.9 FEVER, UNSPECIFIED FEVER CAUSE: ICD-10-CM

## 2019-08-30 LAB
FLOW CYTOMETRY ANTIBODIES ANALYZED - LYMPH NODE: NORMAL
FLOW CYTOMETRY COMMENT - LYMPH NODE: NORMAL
FLOW CYTOMETRY INTERPRETATION - LYMPH NODE: NORMAL

## 2019-08-30 RX ORDER — IBUPROFEN 800 MG/1
800 TABLET ORAL 3 TIMES DAILY
Qty: 30 TABLET | Refills: 0 | Status: SHIPPED | OUTPATIENT
Start: 2019-08-30 | End: 2019-11-19 | Stop reason: ALTCHOICE

## 2019-09-01 ENCOUNTER — TELEPHONE (OUTPATIENT)
Dept: FAMILY MEDICINE | Facility: CLINIC | Age: 39
End: 2019-09-01

## 2019-09-05 ENCOUNTER — PATIENT MESSAGE (OUTPATIENT)
Dept: FAMILY MEDICINE | Facility: CLINIC | Age: 39
End: 2019-09-05

## 2019-09-05 ENCOUNTER — TELEPHONE (OUTPATIENT)
Dept: FAMILY MEDICINE | Facility: CLINIC | Age: 39
End: 2019-09-05

## 2019-09-05 ENCOUNTER — LAB VISIT (OUTPATIENT)
Dept: LAB | Facility: HOSPITAL | Age: 39
End: 2019-09-05
Attending: INTERNAL MEDICINE
Payer: COMMERCIAL

## 2019-09-05 DIAGNOSIS — I88.9 LYMPHADENITIS: ICD-10-CM

## 2019-09-05 DIAGNOSIS — I88.9 LYMPHADENITIS: Primary | ICD-10-CM

## 2019-09-05 DIAGNOSIS — R59.0 AXILLARY ADENOPATHY: Primary | ICD-10-CM

## 2019-09-05 PROCEDURE — 86611 BARTONELLA ANTIBODY: CPT

## 2019-09-05 PROCEDURE — 36415 COLL VENOUS BLD VENIPUNCTURE: CPT | Mod: PN

## 2019-09-05 RX ORDER — AZITHROMYCIN 250 MG/1
TABLET, FILM COATED ORAL
Qty: 6 TABLET | Refills: 0 | Status: SHIPPED | OUTPATIENT
Start: 2019-09-05 | End: 2019-09-10

## 2019-09-05 NOTE — TELEPHONE ENCOUNTER
Called patient to discuss biopsy results. Had discussed this with Heme/Onc briefly prior to calling patient. Still not entirely certain what is causing the lymph node inflammation, possibly still reactive, possibly necrotizing adenitis. No signs of infection. Patient reports lymph node is still large, though reassuringly he is just now starting to have less pain. After discussion with Heme/Onc, recommended excisional biopsy to obtain more information.

## 2019-09-12 ENCOUNTER — PATIENT MESSAGE (OUTPATIENT)
Dept: FAMILY MEDICINE | Facility: CLINIC | Age: 39
End: 2019-09-12

## 2019-09-13 ENCOUNTER — TELEPHONE (OUTPATIENT)
Dept: FAMILY MEDICINE | Facility: CLINIC | Age: 39
End: 2019-09-13

## 2019-09-13 DIAGNOSIS — E66.01 CLASS 2 SEVERE OBESITY DUE TO EXCESS CALORIES WITH SERIOUS COMORBIDITY AND BODY MASS INDEX (BMI) OF 36.0 TO 36.9 IN ADULT: ICD-10-CM

## 2019-09-13 DIAGNOSIS — A28.1 CAT-SCRATCH DISEASE: Primary | ICD-10-CM

## 2019-09-13 LAB
B HENSELAE IGG SER QL: ABNORMAL TITER
B HENSELAE IGG SER QL: NEGATIVE TITER

## 2019-09-13 NOTE — TELEPHONE ENCOUNTER
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 In light of this positive test and his lymphadenopathy finally improving, OK to cancel surgical consultation at this time. He is planning to schedule labs and schedule follow up with Dr. Napier at the end of October.     Please cancel surgery apt.

## 2019-09-17 ENCOUNTER — TELEPHONE (OUTPATIENT)
Dept: FAMILY MEDICINE | Facility: CLINIC | Age: 39
End: 2019-09-17

## 2019-09-17 DIAGNOSIS — A28.1 CAT-SCRATCH DISEASE: Primary | ICD-10-CM

## 2019-09-19 NOTE — TELEPHONE ENCOUNTER
Please notify patient but I would like to add repeat Bartonella antibody panel to compare a 2 week difference.  Please also add to his scheduled labs before his next follow-up appointment 09/13/2019 orders by Dr. Brandt, CBC, LDH and fasting lipid panel to my scheduled labs before his next appointment.  But 1st an EGD to find out what lab they were sent to its marked by agency [facility]  Please find out from our lab what agency lab this is from

## 2019-09-20 ENCOUNTER — TELEPHONE (OUTPATIENT)
Dept: FAMILY MEDICINE | Facility: CLINIC | Age: 39
End: 2019-09-20

## 2019-09-20 NOTE — TELEPHONE ENCOUNTER
Please also notify patient that additional Bartonella antibody panel were added to his labs in please ensure that there added to his scheduled labs for the next appointment with me

## 2019-09-20 NOTE — TELEPHONE ENCOUNTER
Please link is Bartonella antibody panel repeat to 9/13/19 labs; so both can be obtained prior to his follow-up appointment with me

## 2019-09-25 ENCOUNTER — TELEPHONE (OUTPATIENT)
Dept: HEMATOLOGY/ONCOLOGY | Facility: CLINIC | Age: 39
End: 2019-09-25

## 2019-09-25 ENCOUNTER — TELEPHONE (OUTPATIENT)
Dept: FAMILY MEDICINE | Facility: CLINIC | Age: 39
End: 2019-09-25

## 2019-09-25 DIAGNOSIS — I10 ESSENTIAL HYPERTENSION: Primary | ICD-10-CM

## 2019-09-25 NOTE — TELEPHONE ENCOUNTER
"Spoke with pt.  Pt declines need to come in at this time.  Pt said, "Another diagnosis was found." Pt instructed to call office back if a need arises. Pt verbalized understanding and appreciation.  "

## 2019-09-25 NOTE — TELEPHONE ENCOUNTER
Spoke with pt, advised on test results, advised on labs added to upcoming visit  Voiced understanding

## 2019-09-25 NOTE — TELEPHONE ENCOUNTER
Please notify patient that and order for BMP has been ordered and please add this to his scheduled labs before his next follow-up.  Please inform him that this will include a potassium level and kidney function.  Please also advised patient 08/23/2019 on blood chemistry his potassium was low normal at 3.5.

## 2019-09-25 NOTE — TELEPHONE ENCOUNTER
----- Message from Rosalie Vargas LPN sent at 9/25/2019  2:49 PM CDT -----  Hi. I'm going through our referral basket.  This pt's name came up, and was going to call him.  I just want to clarify with PCP.  He still needs to see hem/onc?  STANISLAW Wiggins

## 2019-09-30 ENCOUNTER — PATIENT OUTREACH (OUTPATIENT)
Dept: ADMINISTRATIVE | Facility: HOSPITAL | Age: 39
End: 2019-09-30

## 2019-10-10 ENCOUNTER — LAB VISIT (OUTPATIENT)
Dept: LAB | Facility: HOSPITAL | Age: 39
End: 2019-10-10
Attending: INTERNAL MEDICINE
Payer: COMMERCIAL

## 2019-10-10 DIAGNOSIS — E66.01 CLASS 2 SEVERE OBESITY DUE TO EXCESS CALORIES WITH SERIOUS COMORBIDITY AND BODY MASS INDEX (BMI) OF 36.0 TO 36.9 IN ADULT: ICD-10-CM

## 2019-10-10 DIAGNOSIS — I10 ESSENTIAL HYPERTENSION: ICD-10-CM

## 2019-10-10 DIAGNOSIS — A28.1 CAT-SCRATCH DISEASE: ICD-10-CM

## 2019-10-10 LAB
ANION GAP SERPL CALC-SCNC: 9 MMOL/L (ref 8–16)
BASOPHILS # BLD AUTO: 0.04 K/UL (ref 0–0.2)
BASOPHILS NFR BLD: 0.5 % (ref 0–1.9)
BUN SERPL-MCNC: 16 MG/DL (ref 6–20)
CALCIUM SERPL-MCNC: 9.2 MG/DL (ref 8.7–10.5)
CHLORIDE SERPL-SCNC: 107 MMOL/L (ref 95–110)
CHOLEST SERPL-MCNC: 175 MG/DL (ref 120–199)
CHOLEST/HDLC SERPL: 5.8 {RATIO} (ref 2–5)
CO2 SERPL-SCNC: 24 MMOL/L (ref 23–29)
CREAT SERPL-MCNC: 0.9 MG/DL (ref 0.5–1.4)
DIFFERENTIAL METHOD: ABNORMAL
EOSINOPHIL # BLD AUTO: 0.2 K/UL (ref 0–0.5)
EOSINOPHIL NFR BLD: 1.9 % (ref 0–8)
ERYTHROCYTE [DISTWIDTH] IN BLOOD BY AUTOMATED COUNT: 13.8 % (ref 11.5–14.5)
EST. GFR  (AFRICAN AMERICAN): >60 ML/MIN/1.73 M^2
EST. GFR  (NON AFRICAN AMERICAN): >60 ML/MIN/1.73 M^2
GLUCOSE SERPL-MCNC: 90 MG/DL (ref 70–110)
HCT VFR BLD AUTO: 42.2 % (ref 40–54)
HDLC SERPL-MCNC: 30 MG/DL (ref 40–75)
HDLC SERPL: 17.1 % (ref 20–50)
HGB BLD-MCNC: 13.9 G/DL (ref 14–18)
IMM GRANULOCYTES # BLD AUTO: 0.02 K/UL (ref 0–0.04)
IMM GRANULOCYTES NFR BLD AUTO: 0.2 % (ref 0–0.5)
LDH SERPL L TO P-CCNC: 152 U/L (ref 110–260)
LDLC SERPL CALC-MCNC: 114.2 MG/DL (ref 63–159)
LYMPHOCYTES # BLD AUTO: 2.8 K/UL (ref 1–4.8)
LYMPHOCYTES NFR BLD: 33.5 % (ref 18–48)
MCH RBC QN AUTO: 28.7 PG (ref 27–31)
MCHC RBC AUTO-ENTMCNC: 32.9 G/DL (ref 32–36)
MCV RBC AUTO: 87 FL (ref 82–98)
MONOCYTES # BLD AUTO: 0.6 K/UL (ref 0.3–1)
MONOCYTES NFR BLD: 7.3 % (ref 4–15)
NEUTROPHILS # BLD AUTO: 4.8 K/UL (ref 1.8–7.7)
NEUTROPHILS NFR BLD: 56.6 % (ref 38–73)
NONHDLC SERPL-MCNC: 145 MG/DL
NRBC BLD-RTO: 0 /100 WBC
PLATELET # BLD AUTO: 263 K/UL (ref 150–350)
PMV BLD AUTO: 9.8 FL (ref 9.2–12.9)
POTASSIUM SERPL-SCNC: 3.6 MMOL/L (ref 3.5–5.1)
RBC # BLD AUTO: 4.84 M/UL (ref 4.6–6.2)
SODIUM SERPL-SCNC: 140 MMOL/L (ref 136–145)
TRIGL SERPL-MCNC: 154 MG/DL (ref 30–150)
WBC # BLD AUTO: 8.41 K/UL (ref 3.9–12.7)

## 2019-10-10 PROCEDURE — 80048 BASIC METABOLIC PNL TOTAL CA: CPT

## 2019-10-10 PROCEDURE — 85025 COMPLETE CBC W/AUTO DIFF WBC: CPT

## 2019-10-10 PROCEDURE — 80061 LIPID PANEL: CPT

## 2019-10-10 PROCEDURE — 86611 BARTONELLA ANTIBODY: CPT | Mod: 91

## 2019-10-10 PROCEDURE — 83615 LACTATE (LD) (LDH) ENZYME: CPT

## 2019-10-10 PROCEDURE — 36415 COLL VENOUS BLD VENIPUNCTURE: CPT | Mod: PN

## 2019-10-14 ENCOUNTER — TELEPHONE (OUTPATIENT)
Dept: FAMILY MEDICINE | Facility: CLINIC | Age: 39
End: 2019-10-14

## 2019-10-16 ENCOUNTER — PATIENT MESSAGE (OUTPATIENT)
Dept: ADMINISTRATIVE | Facility: OTHER | Age: 39
End: 2019-10-16

## 2019-10-16 ENCOUNTER — OFFICE VISIT (OUTPATIENT)
Dept: FAMILY MEDICINE | Facility: CLINIC | Age: 39
End: 2019-10-16
Payer: COMMERCIAL

## 2019-10-16 VITALS
DIASTOLIC BLOOD PRESSURE: 85 MMHG | SYSTOLIC BLOOD PRESSURE: 125 MMHG | RESPIRATION RATE: 18 BRPM | HEIGHT: 71 IN | BODY MASS INDEX: 37.33 KG/M2 | WEIGHT: 266.63 LBS | OXYGEN SATURATION: 97 % | HEART RATE: 64 BPM

## 2019-10-16 DIAGNOSIS — R68.89 DECREASED EXERCISE TOLERANCE: Primary | ICD-10-CM

## 2019-10-16 DIAGNOSIS — E78.5 DYSLIPIDEMIA: ICD-10-CM

## 2019-10-16 DIAGNOSIS — J45.20 MILD INTERMITTENT ASTHMA, UNSPECIFIED WHETHER COMPLICATED: ICD-10-CM

## 2019-10-16 DIAGNOSIS — I10 ESSENTIAL HYPERTENSION: ICD-10-CM

## 2019-10-16 PROCEDURE — 99214 OFFICE O/P EST MOD 30 MIN: CPT | Mod: S$GLB,,, | Performed by: INTERNAL MEDICINE

## 2019-10-16 PROCEDURE — 99999 PR PBB SHADOW E&M-EST. PATIENT-LVL IV: ICD-10-PCS | Mod: PBBFAC,,, | Performed by: INTERNAL MEDICINE

## 2019-10-16 PROCEDURE — 99999 PR PBB SHADOW E&M-EST. PATIENT-LVL IV: CPT | Mod: PBBFAC,,, | Performed by: INTERNAL MEDICINE

## 2019-10-16 PROCEDURE — 99214 PR OFFICE/OUTPT VISIT, EST, LEVL IV, 30-39 MIN: ICD-10-PCS | Mod: S$GLB,,, | Performed by: INTERNAL MEDICINE

## 2019-10-16 NOTE — PROGRESS NOTES
Assessment and Plan:    1. Decreased exercise tolerance  Reports decreased ability to exercise in the last month which does not seem to be related to asthma. He has risk factors for heart disease including family history, HTN, and dyslipidemia. Discussed getting stress test. If this is normal, recommend slowly increasing his amount of exercise.   - Stress Echo Which stress agent will be used? Treadmill Exercise; Color Flow Doppler? No; Future    2. Essential hypertension  Controlled at home but always higher here. He is interested in digital HTN program, referral placed.   - Stress Echo Which stress agent will be used? Treadmill Exercise; Color Flow Doppler? No; Future    3. Dyslipidemia  The 10-year ASCVD risk score (Balbina FRANCOIS Jr., et al., 2013) is: 2.2%    Values used to calculate the score:      Age: 39 years      Sex: Male      Is Non- : No      Diabetic: No      Tobacco smoker: No      Systolic Blood Pressure: 125 mmHg      Is BP treated: Yes      HDL Cholesterol: 30 mg/dL      Total Cholesterol: 175 mg/dL  Discussed increased exercise as above.   - Stress Echo Which stress agent will be used? Treadmill Exercise; Color Flow Doppler? No; Future    4. Mild intermittent asthma, unspecified whether complicated  No recent need for albuterol.     ______________________________________________________________________  Subjective:    Chief Complaint:  Establish care, review chronic medical conditions.    HPI:  Chad is a 39 y.o. year old man here to establish care and review chronic medical conditions.     Since the diagnosis of bartonella earlier this year he has continued to feel some degree of fatigue, especially later in the day. He notes that his exercise tolerance over this time that is also very slowly improving. He has tried using albuterol when feeling out of breath and this has not helped at all.     HTN- Taking losartan 100 mg daily. Always higher when he is in clinic, but he has been  monitoring this at home and BP has been 110s-low 130s/80s. He brings in BP log today. Average has been around 125/85.     Asthma- Has Rx for albuterol, reports today that he has not needed to use this in about 6-7 months.     Dyslipidemia- Has never been on medication for this. Had been trying to increase his exercise and saw improvement in HDL with this, but then    Past Medical History:  Past Medical History:   Diagnosis Date    Asthma     exercise induced    Cardiac murmur     Dyslipidemia     Hypertension     Obesity     Seasonal and perennial allergic rhinitis        Past Surgical History:  Past Surgical History:   Procedure Laterality Date    ADENOIDECTOMY  2004    CHOLECYSTECTOMY  2010    laparoscopic removal gallstones    TONSILLECTOMY  2004    corrective septoplasty same time.       Family History:  Family History   Problem Relation Age of Onset    Cancer Mother         triple breast cancer at 2 different times.     Heart disease Father         2V CABG at 45; 12 coronary stents.    Hyperlipidemia Father     Mental illness Father         MIKALA    Stroke Father         traumatic CVA    Vision loss Father         very poor vision    Diabetes Maternal Uncle     Hypertension Paternal Uncle     Cancer Maternal Grandfather          from melanoma at his 60's.    Early death Maternal Grandfather          early 60's of melanoma    Diabetes Paternal Grandmother     Hypertension Paternal Grandfather        Social History:  Social History     Socioeconomic History    Marital status:      Spouse name: Not on file    Number of children: Not on file    Years of education: Not on file    Highest education level: Not on file   Occupational History    Occupation:  for sales force.    Social Needs    Financial resource strain: Not hard at all    Food insecurity:     Worry: Never true     Inability: Never true    Transportation needs:     Medical: No     Non-medical: No    Tobacco Use    Smoking status: Never Smoker    Smokeless tobacco: Never Used   Substance and Sexual Activity    Alcohol use: Yes     Alcohol/week: 4.0 standard drinks     Types: 4 Cans of beer per week     Frequency: 2-4 times a month     Drinks per session: 1 or 2     Binge frequency: Never    Drug use: No    Sexual activity: Not on file   Lifestyle    Physical activity:     Days per week: 5 days     Minutes per session: 40 min    Stress: Not at all   Relationships    Social connections:     Talks on phone: Never     Gets together: Once a week     Attends Taoism service: Not on file     Active member of club or organization: No     Attends meetings of clubs or organizations: Never     Relationship status:    Other Topics Concern    Not on file   Social History Narrative    Not on file       Medications:  Current Outpatient Medications on File Prior to Visit   Medication Sig Dispense Refill    ibuprofen (ADVIL,MOTRIN) 800 MG tablet Take 1 tablet (800 mg total) by mouth 3 (three) times daily. 30 tablet 0    losartan (COZAAR) 100 MG tablet 100 mg po each morning 30 tablet 5    om 3/E/linol/ala/oleic/gla/lip (OMEGA 3-6-9 ORAL) Take 1 capsule by mouth once daily.      potassium chloride (MICRO-K) 10 MEQ CpSR 10 meq po each morning. 30 capsule 5    PROAIR RESPICLICK 90 mcg/actuation AePB Inhale 2 puffs into the lungs daily as needed.      [DISCONTINUED] cefdinir (OMNICEF) 300 MG capsule Take 300 mg by mouth 2 (two) times daily.  0     No current facility-administered medications on file prior to visit.        Allergies:  Inderal [propranolol] and Lisinopril    Immunizations:  Immunization History   Administered Date(s) Administered    Influenza - Quadrivalent - PF (6 months and older) 10/14/2019    Pneumococcal Polysaccharide - 23 Valent 01/01/2010    Tdap 05/29/2009, 05/22/2016       Review of Systems:  Review of Systems   Respiratory: Negative for shortness of breath.    Cardiovascular:  "Negative for chest pain and palpitations.   Musculoskeletal: Negative for neck pain.   Neurological: Negative for headaches.     Entered by patient and reviewed and updated during visit      Objective:    Vitals:  Vitals:    10/16/19 1108 10/16/19 1136   BP: (!) 140/94 125/85   Pulse: 64    Resp: 18    SpO2: 97%    Weight: 120.9 kg (266 lb 10.3 oz)    Height: 5' 11" (1.803 m)    PainSc:   3    PainLoc: Back        Physical Exam   Constitutional: He is oriented to person, place, and time. He appears well-developed and well-nourished. No distress.   HENT:   Mouth/Throat: Oropharynx is clear and moist.   Eyes: No scleral icterus.   Cardiovascular: Normal rate and regular rhythm.   No murmur heard.  Pulmonary/Chest: Effort normal and breath sounds normal. No respiratory distress. He has no wheezes.   Musculoskeletal: He exhibits no edema.   Neurological: He is alert and oriented to person, place, and time.   Skin: Skin is warm and dry.   Psychiatric: He has a normal mood and affect. His behavior is normal.   Vitals reviewed.      Data:    Low HDL, , TG slightly elevated at 154, CBC with mild anemia, BMP normal    Bronwyn Brandt MD  Internal Medicine    "

## 2019-10-18 ENCOUNTER — CLINICAL SUPPORT (OUTPATIENT)
Dept: CARDIOLOGY | Facility: CLINIC | Age: 39
End: 2019-10-18
Attending: INTERNAL MEDICINE
Payer: COMMERCIAL

## 2019-10-18 ENCOUNTER — PATIENT MESSAGE (OUTPATIENT)
Dept: FAMILY MEDICINE | Facility: CLINIC | Age: 39
End: 2019-10-18

## 2019-10-18 VITALS — WEIGHT: 266 LBS | BODY MASS INDEX: 37.24 KG/M2 | HEIGHT: 71 IN

## 2019-10-18 DIAGNOSIS — E78.5 DYSLIPIDEMIA: ICD-10-CM

## 2019-10-18 DIAGNOSIS — R68.89 DECREASED EXERCISE TOLERANCE: ICD-10-CM

## 2019-10-18 DIAGNOSIS — I10 ESSENTIAL HYPERTENSION: ICD-10-CM

## 2019-10-18 LAB
ASCENDING AORTA: 3.59 CM
B HENSELAE IGG SER QL: ABNORMAL TITER
B HENSELAE IGG SER QL: NEGATIVE TITER
BSA FOR ECHO PROCEDURE: 2.46 M2
CV ECHO LV RWT: 0.54 CM
CV STRESS BASE HR: 86 BPM
DIASTOLIC BLOOD PRESSURE: 104 MMHG
DOP CALC LVOT AREA: 4.3 CM2
DOP CALC LVOT DIAMETER: 2.33 CM
DOP CALC LVOT PEAK VEL: 1.21 M/S
DOP CALC LVOT STROKE VOLUME: 92.01 CM3
DOP CALCLVOT PEAK VEL VTI: 21.59 CM
E WAVE DECELERATION TIME: 294.49 MSEC
E/A RATIO: 0.83
E/E' RATIO: 8.92 M/S
ECHO LV POSTERIOR WALL: 1.3 CM (ref 0.6–1.1)
FRACTIONAL SHORTENING: 38 % (ref 28–44)
INTERVENTRICULAR SEPTUM: 1.22 CM (ref 0.6–1.1)
LA MAJOR: 5.19 CM
LA MINOR: 3.96 CM
LA WIDTH: 4.91 CM
LEFT ATRIUM SIZE: 4.6 CM
LEFT ATRIUM VOLUME INDEX: 36.2 ML/M2
LEFT ATRIUM VOLUME: 86.24 CM3
LEFT INTERNAL DIMENSION IN SYSTOLE: 2.98 CM (ref 2.1–4)
LEFT VENTRICLE DIASTOLIC VOLUME INDEX: 44.74 ML/M2
LEFT VENTRICLE DIASTOLIC VOLUME: 106.52 ML
LEFT VENTRICLE MASS INDEX: 98 G/M2
LEFT VENTRICLE SYSTOLIC VOLUME INDEX: 14.5 ML/M2
LEFT VENTRICLE SYSTOLIC VOLUME: 34.57 ML
LEFT VENTRICULAR INTERNAL DIMENSION IN DIASTOLE: 4.78 CM (ref 3.5–6)
LEFT VENTRICULAR MASS: 233.39 G
LV LATERAL E/E' RATIO: 8.29 M/S
LV SEPTAL E/E' RATIO: 9.67 M/S
MV PEAK A VEL: 0.7 M/S
MV PEAK E VEL: 0.58 M/S
OHS CV CPX 1 MINUTE RECOVERY HEART RATE: 133 BPM
OHS CV CPX 85 PERCENT MAX PREDICTED HEART RATE MALE: 152
OHS CV CPX ESTIMATED METS: 13
OHS CV CPX MAX PREDICTED HEART RATE: 179
OHS CV CPX PATIENT IS FEMALE: 0
OHS CV CPX PATIENT IS MALE: 1
OHS CV CPX PEAK DIASTOLIC BLOOD PRESSURE: 93 MMHG
OHS CV CPX PEAK HEAR RATE: 166 BPM
OHS CV CPX PEAK RATE PRESSURE PRODUCT: ABNORMAL
OHS CV CPX PEAK SYSTOLIC BLOOD PRESSURE: 243 MMHG
OHS CV CPX PERCENT MAX PREDICTED HEART RATE ACHIEVED: 93
OHS CV CPX RATE PRESSURE PRODUCT PRESENTING: ABNORMAL
PULM VEIN S/D RATIO: 0.84
PV PEAK D VEL: 0.62 M/S
PV PEAK S VEL: 0.52 M/S
RA MAJOR: 5.01 CM
RA PRESSURE: 3 MMHG
RA WIDTH: 3.85 CM
SINUS: 3.13 CM
STJ: 3.22 CM
STRESS ECHO POST EXERCISE DUR MIN: 7 MINUTES
STRESS ECHO POST EXERCISE DUR SEC: 26 SECONDS
SYSTOLIC BLOOD PRESSURE: 153 MMHG
TDI LATERAL: 0.07 M/S
TDI SEPTAL: 0.06 M/S
TDI: 0.07 M/S

## 2019-10-18 PROCEDURE — 99999 PR PBB SHADOW E&M-EST. PATIENT-LVL II: CPT | Mod: PBBFAC,,,

## 2019-10-18 PROCEDURE — 99999 PR PBB SHADOW E&M-EST. PATIENT-LVL II: ICD-10-PCS | Mod: PBBFAC,,,

## 2019-10-18 PROCEDURE — 93351 STRESS ECHO (CUPID ONLY): ICD-10-PCS | Mod: S$GLB,,, | Performed by: INTERNAL MEDICINE

## 2019-10-18 PROCEDURE — 93351 STRESS TTE COMPLETE: CPT | Mod: S$GLB,,, | Performed by: INTERNAL MEDICINE

## 2019-10-23 ENCOUNTER — PATIENT OUTREACH (OUTPATIENT)
Dept: OTHER | Facility: OTHER | Age: 39
End: 2019-10-23

## 2019-10-23 NOTE — LETTER
November 22, 2019     Chad Chen  [address] [place] [address]       Dear Chad,    Welcome to [facility]! Our goal is to make care effective, proactive and convenient by using data you send us from home to better treat your chronic conditions.              My name is Javad Romero, and I am your dedicated Digital Medicine clinician. As an expert in medication management, I will help ensure that the medications you are taking continue to provide the intended benefits and help you reach your goals. You can reach me directly at [phone] or by sending me a message directly through your [facility] account.      I am Cristobal Hyman and I will be your health . My job is to help you identify lifestyle changes to improve your disease control. We will talk about nutrition, exercise, and other ways you may be able to adjust your current habits to better your health. Additionally, we will help ensure you are completing the tests and screenings that are necessary to help manage your conditions. You can reach me directly at [phone] or by sending me a message directly through your [facility] account.    Most importantly, YOU are at the center of this team. Together, we will work to improve your overall health and encourage you to meet your goals for a healthier lifestyle.     What we expect from YOU:  · Please take frequent home blood pressure measurements. We ask that you take at least 1 blood pressure reading per week, but more information will better help us get you know you. Be sure you rest for a few minutes before taking the reading in a quiet, comfortable place.     Be available to receive phone calls or [facility] messages, when appropriate, from your care team. Please let us know if there are any specific days or times that work best for us to reach you via phone.     Complete routine tests and screenings. Dont worry, we will help keep you on track!           What you should  expect from your Digital Medicine Care Team:   We will work with you to create a personalized plan of care and provide you with encouragement and education, including regarding lifestyle changes, that could help you manage your disease states.     We will adjust your current medications, if needed, and continue to monitor your long-term progress.     We will provide you and your physician with monthly progress reports after you have been in the program for more than 30 days.     We will send you reminders through [facility] and text messages to help ensure you do not miss any testing deadlines to help manage your disease states.    You will be able to reach us by phone or through your [facility] account by clicking our names under Care Team on the right side of the home screen.    I look forward to working with you to achieve your blood pressure goals!    We look forward to working with you to help manage your health,    Sincerely,    Your Digital Medicine Team    Please visit our websites to learn more:   · Hypertension: [email or web address]      Remember, we are not available for emergencies. If you have an emergency, please contact your doctors office directly or call [facility] on-call ([phone] or [phone]) or [phone].

## 2019-10-23 NOTE — LETTER
November 22, 2019     Chad Chen  [address] [place] [address]       Dear Chad,    [facility] Our goal is to make care effective, proactive and convenient by using data you send us from home to better treat your chronic conditions.              My name is Javad Romero, and I am your dedicated Digital Medicine clinician. As an expert in medication management, I will help ensure that the medications you are taking continue to provide the intended benefits and help you reach your goals. You can reach me directly at [phone] or by sending me a message directly through your [facility] account.      I am Cristobal Hyman and I will be your health . My job is to help you identify lifestyle changes to improve your disease control. We will talk about nutrition, exercise, and other ways you may be able to adjust your current habits to better your health. Additionally, we will help ensure you are completing the tests and screenings that are necessary to help manage your conditions. You can reach me directly at [phone] or by sending me a message directly through your [facility] account.    Most importantly, YOU are at the center of this team. Together, we will work to improve your overall health and encourage you to meet your goals for a healthier lifestyle.     What we expect from YOU:  · Please take frequent home blood pressure measurements. We ask that you take at least 1 blood pressure reading per week, but more information will better help us get you know you. Be sure you rest for a few minutes before taking the reading in a quiet, comfortable place.     Be available to receive phone calls or [facility] messages, when appropriate, from your care team. Please let us know if there are any specific days or times that work best for us to reach you via phone.     Complete routine tests and screenings. Dont worry, we will help keep you on track!           What you should  expect from your Digital Medicine Care Team:   We will work with you to create a personalized plan of care and provide you with encouragement and education, including regarding lifestyle changes, that could help you manage your disease states.     We will adjust your current medications, if needed, and continue to monitor your long-term progress.     We will provide you and your physician with monthly progress reports after you have been in the program for more than 30 days.     We will send you reminders through [facility] and text messages to help ensure you do not miss any testing deadlines to help manage your disease states.    You will be able to reach us by phone or through your [facility] account by clicking our names under Care Team on the right side of the home screen.    I look forward to working with you to achieve your blood pressure goals!    We look forward to working with you to help manage your health,    Sincerely,    Your Digital Medicine Team    Please visit our websites to learn more:   · Hypertension: [email or web address]      Remember, we are not available for emergencies. If you have an emergency, please contact your doctors office directly or call [facility] on-call ([phone] or [phone]) or [phone].

## 2019-10-30 ENCOUNTER — TELEPHONE (OUTPATIENT)
Dept: FAMILY MEDICINE | Facility: CLINIC | Age: 39
End: 2019-10-30

## 2019-10-30 NOTE — TELEPHONE ENCOUNTER
----- Message from Michaela Peng sent at 10/30/2019  2:27 PM CDT -----  Contact: Susan  spring Espino  Type: Needs Medical Advice    Who Called:  Susan  spring Espino  Best Call Back Number: [phone], direct line  Additional Information: if we ever need anything, she will be more than happy to assist us--please advise--thank you

## 2019-11-07 ENCOUNTER — PATIENT MESSAGE (OUTPATIENT)
Dept: OTHER | Facility: OTHER | Age: 39
End: 2019-11-07

## 2019-11-07 ENCOUNTER — PATIENT OUTREACH (OUTPATIENT)
Dept: OTHER | Facility: OTHER | Age: 39
End: 2019-11-07

## 2019-11-07 NOTE — PROGRESS NOTES
Digital Medicine: Health  Introduction    Introduced Chad Chen to Digital Medicine. Discussed health  role and recommended lifestyle modifications.    The history is provided by the patient. No  was used.     Follow Up  Reviewed proper blood pressure technique. Proper technique confirmed.    HYPERTENSION    Patient's BP goal is 130/80.Patient's BP average is 142/90 mmHg, which is above goal, per 2017 ACC/AHA Hypertension Guidelines.        Last 5 Patient Entered Readings                                      Current 30 Day Average: 142/90     Recent Readings 11/7/2019 11/7/2019 11/7/2019 11/7/2019 11/6/2019    SBP (mmHg) 142 164 149 162 148    DBP (mmHg) 82 101 84 104 90    Pulse 67 62 70 71 69        Intervention/Plan    There are no preventive care reminders to display for this patient.    Reviewed the importance of self-monitoring, medication adherence, and that the health  can be used as a resource for lifestyle modifications to help reduce or maintain a healthy lifestyle.    Sent link to [facility] webpages and my contact information via [employer] for future questions. Follow up scheduled.         Diet Screening   Breakfast is typically between. Bowl of rasianbran with soy milk.  Lunch is typically between. Baked chicken wings with a cajun rub that is low in sodium.    Dinner is typically between. Protein and a side dish (fish, red meat, pasta, pork, chicken)  salad.    He has the following dietary restrictions: low sodium dietHe cooks for self and has meals prepared by family.    Patient does the shopping for groceries and lets family grocery shop.      Expressed the importance of limiting sodium to <2000mg daily.    Intervention(s): reducing sodium intake and reading food labels    Physical Activity Screening   When asked if exercising, patient responded: no    Patient used to exercise but then got sick with Cat Scratch disease. Used to exercise 5 days a  week but now is down to once a week.    Patient set a SMART goal during outreach that was to walk a minimum of four 5k's in the next two weeks. Will follow up on this goal with patient at the next outreach.    ARABELLA

## 2019-11-09 ENCOUNTER — PATIENT MESSAGE (OUTPATIENT)
Dept: OTHER | Facility: OTHER | Age: 39
End: 2019-11-09

## 2019-11-10 ENCOUNTER — PATIENT MESSAGE (OUTPATIENT)
Dept: OTHER | Facility: OTHER | Age: 39
End: 2019-11-10

## 2019-11-19 ENCOUNTER — PATIENT OUTREACH (OUTPATIENT)
Dept: OTHER | Facility: OTHER | Age: 39
End: 2019-11-19

## 2019-11-19 DIAGNOSIS — I10 ESSENTIAL HYPERTENSION: ICD-10-CM

## 2019-11-19 DIAGNOSIS — E78.5 DYSLIPIDEMIA: Primary | ICD-10-CM

## 2019-11-19 RX ORDER — VALSARTAN 160 MG/1
160 TABLET ORAL DAILY
Qty: 90 TABLET | Refills: 3 | Status: SHIPPED | OUTPATIENT
Start: 2019-11-19 | End: 2020-01-03 | Stop reason: SDUPTHER

## 2019-11-19 NOTE — PROGRESS NOTES
Digital Medicine: Clinician Introduction    Chad Chen is a 39 y.o. male who is newly enrolled in the Digital Medicine Clinic.    The following information was reviewed and updated:  Preferred pharmacy   [id number] - [place], [place] - [address] AT [facility] [address] & [place]  [address] [address]  Phone: [phone] Fax: [phone]        Review of patient's allergies indicates:   Allergen Reactions    Inderal [propranolol] Hives    Lisinopril Other (See Comments)     cough       Blood pressure at last clinic appt was aggregate of home readings on old cuff that he now believes was inaccurate. Older cuff had a hole that led to leaking when inflated.     Patient has been monitoring blood pressures before and 2 hours after medications and notes minimal improvement. In systolic and moderate improvement in diastolic blood pressures.     The history is provided by the patient.     HYPERTENSION  Our goal is to get BP to consistently below 130/80mmHg and make the process convenient so patient can avoid extra trips to the office. Getting your blood pressure below 130/80mmHg (definition of control) will reduce your risk for heart attack, kidney failure, stroke and death (as well as kidney failure, eye disease, & dementia)      Reviewed non-pharmacologic therapies and impact on BP      Explained that we expect patient to obtain several blood pressures per week at random times of day.  Instructed patient not to allow anyone else to use phone and monitoring device.  Confirmed appropriate BP monitoring technique.    Patient's BP goal is 130/80. Patients BP average is 143/90 mmHg, which is above goal, per 2017 ACC/AHA Hypertension Guidelines.  When asked what the patient thinks is causing BP to be elevated, they state: needing to increase activity and lose weight.     Monitoring technique is very good. Note no issues.       Med Review complete.    Allergies reviewed.      Last 5 Patient  Entered Readings                                      Current 30 Day Average: 143/90     Recent Readings 11/19/2019 11/19/2019 11/19/2019 11/19/2019 11/19/2019    Pulse 66 62 67 75 70            INTERVENTION(S)  recommended diet modifications, recommended physical activity, reviewed monitoring technique, recommended med change and encouragement/support    PLAN  patient verbalizes understanding and patient amenable to changes    Blood pressures are consistently elevated at home.   - Patient believes his losartan minimally improves his blood pressure. Note mild SBP improvement and moderate DBP improvement. Discussed changing class or changing medications.   - Will trial more effective agent in ARB class given borderline low potassium. If no benefit, will consider change to CCB.     Will STOP losartan 100mg and START valsartan 160mg daily.     Will schedule next outreach in 2 weeks and likely increase to 320mg daily.    Will discuss labs at next appointment to monitor potassium.       There are no preventive care reminders to display for this patient.    Current Medication Regimen:  Hypertension Medications             losartan (COZAAR) 100 MG tablet 100 mg po each morning            Reviewed the importance of self-monitoring, medication adherence, and that the health  can be used as a resource for lifestyle modifications to help reduce or maintain a healthy lifestyle.    Sent link to [facility] webpages and my contact information via [employer] [facility] for future questions. Follow up scheduled.                     Medication Adherence Screening   He misses doses: never    He wonders if medications are working.

## 2019-11-20 ENCOUNTER — PATIENT MESSAGE (OUTPATIENT)
Dept: OTHER | Facility: OTHER | Age: 39
End: 2019-11-20

## 2019-11-21 ENCOUNTER — PATIENT OUTREACH (OUTPATIENT)
Dept: OTHER | Facility: OTHER | Age: 39
End: 2019-11-21

## 2019-11-21 NOTE — PROGRESS NOTES
Digital Medicine: Health  Follow-Up    The history is provided by the patient. No  was used.     Follow Up  Follow-up reason(s): routine education      Patient reports that he just got his medications changed two days ago. Expressed to patient that his blood pressure readings will fluctuate while he is adjusting his medication and to not be alarmed. Will follow up with patient in two weeks once medication has entered patients system and has begun working.    Intervention/Plan    There are no preventive care reminders to display for this patient.    Last 5 Patient Entered Readings                                      Current 30 Day Average: 142/90     Recent Readings 11/21/2019 11/21/2019 11/21/2019 11/21/2019 11/21/2019    SBP (mmHg) 135 135 144 144 -    DBP (mmHg) 85 85 90 90 -    Pulse 59 59 63 63 63             Screenings    SDOH

## 2019-12-04 ENCOUNTER — PATIENT OUTREACH (OUTPATIENT)
Dept: OTHER | Facility: OTHER | Age: 39
End: 2019-12-04

## 2019-12-04 NOTE — PROGRESS NOTES
Digital Medicine: Clinician Follow-Up    At last outreach, stopped losartan 100mg and started valsartan 160mg daily.    Patient confirms change and denies ADRs. Denies hypotension symptoms.     Patient has been making efforts with diet, exercise and weight loss. Reports having lost weight.    The history is provided by the patient.     Follow Up  Follow-up reason(s): reading review and medication change follow-up      Readings are trending down due to medication adherence.    Patient started new medication.    Is patient tolerating med change?:  Yes      INTERVENTION(S)  recommended diet modifications, recommended physical activity and encouragement/support    PLAN  additional monitoring needed    Blood pressures are trending down with transition from losartan to valsartan.   - Given ARB continuation labs not required at this time.   - Discussed lifestyle changes versus dose optimization. Patient is motivated to make lifestyle changes and has already seen weight loss with current efforts.    Will continue valsartan 160mg daily and continue lifestyle modifications. Will review for dose increase at next outreach, if needed.    Will schedule next outreach in 3-4 weeks.       There are no preventive care reminders to display for this patient.    Last 5 Patient Entered Readings                                      Current 30 Day Average: 142/89     Recent Readings 12/3/2019 12/3/2019 12/3/2019 12/3/2019 12/3/2019    Pulse 63 62 59 62 60             Hypertension Medications             valsartan (DIOVAN) 160 MG tablet Take 1 tablet (160 mg total) by mouth once daily.                 Screenings

## 2019-12-19 ENCOUNTER — PATIENT OUTREACH (OUTPATIENT)
Dept: OTHER | Facility: OTHER | Age: 39
End: 2019-12-19

## 2019-12-19 NOTE — PROGRESS NOTES
Digital Medicine: Health  Follow-Up    The history is provided by the patient. No  was used.     Follow Up  Patient reports that his readings are overall decreasing. Patient reports that his medication was adjusted, he has been walking a couple times a week, and he reports that there are no changes in diet.    Will follow up with patient in two weeks.    Intervention/Plan    There are no preventive care reminders to display for this patient.    Last 5 Patient Entered Readings                                      Current 30 Day Average: 136/86     Recent Readings 12/18/2019 12/18/2019 12/18/2019 12/18/2019 12/18/2019    Pulse 69 63 72 72 65             Screenings    SDOH

## 2019-12-25 ENCOUNTER — PATIENT MESSAGE (OUTPATIENT)
Dept: FAMILY MEDICINE | Facility: CLINIC | Age: 39
End: 2019-12-25

## 2019-12-26 ENCOUNTER — PATIENT MESSAGE (OUTPATIENT)
Dept: FAMILY MEDICINE | Facility: CLINIC | Age: 39
End: 2019-12-26

## 2019-12-31 ENCOUNTER — PATIENT MESSAGE (OUTPATIENT)
Dept: FAMILY MEDICINE | Facility: CLINIC | Age: 39
End: 2019-12-31

## 2019-12-31 DIAGNOSIS — E87.6 HYPOKALEMIA: Primary | ICD-10-CM

## 2020-01-01 NOTE — TELEPHONE ENCOUNTER
Spoke with patient. K now 4.0. Will start taking 10 mEq daily and will discuss in more detail at follow up later this week.

## 2020-01-02 ENCOUNTER — PATIENT OUTREACH (OUTPATIENT)
Dept: OTHER | Facility: OTHER | Age: 40
End: 2020-01-02

## 2020-01-02 NOTE — PROGRESS NOTES
Digital Medicine: Health  Follow-Up    The history is provided by the patient. No  was used.     Follow Up  Patient reports that hospital on [place] for low potassium. Patient reports that he is not back to normal, but for the most part is back to his old ways.    Will follow up with the patient once he hs recovered from the hospital visit.    Intervention/Plan    There are no preventive care reminders to display for this patient.    Last 5 Patient Entered Readings                                      Current 30 Day Average: 128/83     Recent Readings 1/1/2020 1/1/2020 1/1/2020 1/1/2020 1/1/2020    Pulse 72 73 65 66 65             Screenings    SDOH

## 2020-01-03 ENCOUNTER — LAB VISIT (OUTPATIENT)
Dept: LAB | Facility: HOSPITAL | Age: 40
End: 2020-01-03
Attending: INTERNAL MEDICINE
Payer: COMMERCIAL

## 2020-01-03 ENCOUNTER — OFFICE VISIT (OUTPATIENT)
Dept: FAMILY MEDICINE | Facility: CLINIC | Age: 40
End: 2020-01-03
Payer: COMMERCIAL

## 2020-01-03 VITALS
RESPIRATION RATE: 18 BRPM | HEIGHT: 71 IN | WEIGHT: 267.88 LBS | BODY MASS INDEX: 37.5 KG/M2 | OXYGEN SATURATION: 96 % | HEART RATE: 70 BPM | SYSTOLIC BLOOD PRESSURE: 138 MMHG | DIASTOLIC BLOOD PRESSURE: 94 MMHG

## 2020-01-03 DIAGNOSIS — E87.6 HYPOKALEMIA: ICD-10-CM

## 2020-01-03 DIAGNOSIS — I10 ESSENTIAL HYPERTENSION: ICD-10-CM

## 2020-01-03 DIAGNOSIS — E87.6 HYPOKALEMIA: Primary | ICD-10-CM

## 2020-01-03 LAB
ANION GAP SERPL CALC-SCNC: 8 MMOL/L (ref 8–16)
BUN SERPL-MCNC: 13 MG/DL (ref 6–20)
CALCIUM SERPL-MCNC: 9.1 MG/DL (ref 8.7–10.5)
CHLORIDE SERPL-SCNC: 106 MMOL/L (ref 95–110)
CO2 SERPL-SCNC: 25 MMOL/L (ref 23–29)
CREAT SERPL-MCNC: 0.9 MG/DL (ref 0.5–1.4)
EST. GFR  (AFRICAN AMERICAN): >60 ML/MIN/1.73 M^2
EST. GFR  (NON AFRICAN AMERICAN): >60 ML/MIN/1.73 M^2
GLUCOSE SERPL-MCNC: 81 MG/DL (ref 70–110)
POTASSIUM SERPL-SCNC: 3.7 MMOL/L (ref 3.5–5.1)
SODIUM SERPL-SCNC: 139 MMOL/L (ref 136–145)

## 2020-01-03 PROCEDURE — 82088 ASSAY OF ALDOSTERONE: CPT

## 2020-01-03 PROCEDURE — 80048 BASIC METABOLIC PNL TOTAL CA: CPT

## 2020-01-03 PROCEDURE — 99999 PR PBB SHADOW E&M-EST. PATIENT-LVL III: ICD-10-PCS | Mod: PBBFAC,,, | Performed by: INTERNAL MEDICINE

## 2020-01-03 PROCEDURE — 99214 OFFICE O/P EST MOD 30 MIN: CPT | Mod: S$GLB,,, | Performed by: INTERNAL MEDICINE

## 2020-01-03 PROCEDURE — 99214 PR OFFICE/OUTPT VISIT, EST, LEVL IV, 30-39 MIN: ICD-10-PCS | Mod: S$GLB,,, | Performed by: INTERNAL MEDICINE

## 2020-01-03 PROCEDURE — 99999 PR PBB SHADOW E&M-EST. PATIENT-LVL III: CPT | Mod: PBBFAC,,, | Performed by: INTERNAL MEDICINE

## 2020-01-03 RX ORDER — VALSARTAN 320 MG/1
320 TABLET ORAL DAILY
Qty: 30 TABLET | Refills: 5 | Status: SHIPPED | OUTPATIENT
Start: 2020-01-03 | End: 2020-02-03

## 2020-01-03 NOTE — PROGRESS NOTES
Assessment and Plan:    1. Hypokalemia  - Basic metabolic panel; Future  - Aldosterone/Renin Activity Ratio; Future    2. Essential hypertension  - Basic metabolic panel; Future  - Aldosterone/Renin Activity Ratio; Future  - valsartan (DIOVAN) 320 MG tablet; Take 1 tablet (320 mg total) by mouth once daily.  Dispense: 30 tablet; Refill: 5    Patient presenting with persistent borderline hypokalemia despite being on ARB, no other obvious cause of hypokalemia. With concurrent hypertension and hypokalemia, will assess for mineralocorticoid excess. Off of potassium now, will recheck BMP, may need ongoing potassium for now. Will increase valsartan dose.  ______________________________________________________________________  Subjective:    Chief Complaint:  ER follow up    HPI:  Chad is a 39 y.o. year old male here for ER follow up    Symptoms initially started on 12/23 with stomach feeling off. Was drinking gatorade, but not eating that day. Started to feel more and more lethargic and feeling generally poorly. States he did not have the energy to eat so hiw wife took him to [place] [facility]. He had labs that were  unremarkable other than low potassium (2.8). He was given 60 mEq PO potassium and felt better shortly after. He was discharged with 7 days of 20 mEq potassium.     He reports that for many years his potassium has been borderline low, typically in the 3.2-3.4 range. He has not been having any vomiting or diarrhea. He has not been sweating excessively. He has not taken any diuretics. He eats potatoes, but not a lot of other potassium rich foods.    Has been taking the valsartan 160 mg daily and BP has been in the 120s-140s/80s-90s on this dose.    Medications:  Current Outpatient Medications on File Prior to Visit   Medication Sig Dispense Refill    om 3/E/linol/ala/oleic/gla/lip (OMEGA 3-6-9 ORAL) Take 1 capsule by mouth once daily.      PROAIR RESPICLICK 90 mcg/actuation AePB Inhale 2 puffs into the lungs daily as  "needed.      [DISCONTINUED] valsartan (DIOVAN) 160 MG tablet Take 1 tablet (160 mg total) by mouth once daily. 90 tablet 3     No current facility-administered medications on file prior to visit.        Review of Systems:  Review of Systems   Constitutional: Negative for activity change and unexpected weight change.   HENT: Negative for hearing loss, rhinorrhea and trouble swallowing.    Eyes: Negative for discharge and visual disturbance.   Respiratory: Negative for chest tightness.    Cardiovascular: Negative for chest pain and palpitations.   Gastrointestinal: Negative for blood in stool, constipation and vomiting.   Endocrine: Negative for polydipsia and polyuria.   Genitourinary: Negative for difficulty urinating, hematuria and urgency.   Musculoskeletal: Positive for neck pain. Negative for arthralgias and joint swelling.   Neurological: Positive for weakness (diffuse) and headaches.   Psychiatric/Behavioral: Negative for confusion and dysphoric mood.       Past Medical History:  Past Medical History:   Diagnosis Date    Asthma     exercise induced    Cardiac murmur     Dyslipidemia     Hypertension     Obesity     Seasonal and perennial allergic rhinitis        Objective:    Vitals:  Vitals:    01/03/20 1039 01/03/20 1115   BP: (!) 144/90 (!) 138/94   Pulse: 70    Resp: 18    SpO2: 96%    Weight: 121.5 kg (267 lb 13.7 oz)    Height: 5' 11" (1.803 m)    PainSc: 0-No pain        Physical Exam   Constitutional: He is oriented to person, place, and time. He appears well-developed and well-nourished. No distress.   HENT:   Mouth/Throat: Oropharynx is clear and moist.   Eyes: Conjunctivae are normal.   Cardiovascular: Normal rate and regular rhythm.   Pulmonary/Chest: Effort normal. No respiratory distress. He has no wheezes. He has no rales.   Musculoskeletal: He exhibits no edema.   Neurological: He is alert and oriented to person, place, and time.   Skin: Skin is warm and dry. He is not diaphoretic. "   Psychiatric: He has a normal mood and affect. His behavior is normal.   Vitals reviewed.      Data:  Previous labs reviewed and pertinent for K 3.0 on recent labs.      Bronwyn Brandt MD  Internal Medicine

## 2020-01-06 LAB
ALDOST SERPL-MCNC: 15.5 NG/DL
ALDOST/RENIN PLAS-RTO: 8.2 RATIO
RENIN PLAS-CCNC: 1.9 NG/ML/HR

## 2020-01-07 ENCOUNTER — PATIENT MESSAGE (OUTPATIENT)
Dept: FAMILY MEDICINE | Facility: CLINIC | Age: 40
End: 2020-01-07

## 2020-01-07 DIAGNOSIS — E87.6 HYPOKALEMIA: Primary | ICD-10-CM

## 2020-01-08 ENCOUNTER — PATIENT MESSAGE (OUTPATIENT)
Dept: FAMILY MEDICINE | Facility: CLINIC | Age: 40
End: 2020-01-08

## 2020-01-08 NOTE — PROGRESS NOTES
PharmD informed by health  patient recently experienced hospitalization for hypokalemia. PharmD placed task to monitor after PCP follow-up.     Note increase in ARB dose, appropriate BP, and potassium WNL.     Will schedule next outreach in 2 weeks to monitor after dose increase. BMP scheduled in 2 weeks. Will review as well.

## 2020-01-16 ENCOUNTER — PATIENT OUTREACH (OUTPATIENT)
Dept: OTHER | Facility: OTHER | Age: 40
End: 2020-01-16

## 2020-01-22 ENCOUNTER — LAB VISIT (OUTPATIENT)
Dept: LAB | Facility: HOSPITAL | Age: 40
End: 2020-01-22
Attending: INTERNAL MEDICINE
Payer: COMMERCIAL

## 2020-01-22 DIAGNOSIS — E87.6 HYPOKALEMIA: ICD-10-CM

## 2020-01-22 LAB
ANION GAP SERPL CALC-SCNC: 5 MMOL/L (ref 8–16)
BUN SERPL-MCNC: 15 MG/DL (ref 6–20)
CALCIUM SERPL-MCNC: 9.2 MG/DL (ref 8.7–10.5)
CHLORIDE SERPL-SCNC: 106 MMOL/L (ref 95–110)
CO2 SERPL-SCNC: 29 MMOL/L (ref 23–29)
CREAT SERPL-MCNC: 0.9 MG/DL (ref 0.5–1.4)
EST. GFR  (AFRICAN AMERICAN): >60 ML/MIN/1.73 M^2
EST. GFR  (NON AFRICAN AMERICAN): >60 ML/MIN/1.73 M^2
GLUCOSE SERPL-MCNC: 83 MG/DL (ref 70–110)
POTASSIUM SERPL-SCNC: 3.8 MMOL/L (ref 3.5–5.1)
SODIUM SERPL-SCNC: 140 MMOL/L (ref 136–145)

## 2020-01-22 PROCEDURE — 80048 BASIC METABOLIC PNL TOTAL CA: CPT

## 2020-01-22 PROCEDURE — 36415 COLL VENOUS BLD VENIPUNCTURE: CPT | Mod: PN

## 2020-02-01 ENCOUNTER — PATIENT MESSAGE (OUTPATIENT)
Dept: FAMILY MEDICINE | Facility: CLINIC | Age: 40
End: 2020-02-01

## 2020-02-03 ENCOUNTER — PATIENT OUTREACH (OUTPATIENT)
Dept: OTHER | Facility: OTHER | Age: 40
End: 2020-02-03

## 2020-02-03 DIAGNOSIS — I10 HYPERTENSION, UNSPECIFIED TYPE: Primary | ICD-10-CM

## 2020-02-03 RX ORDER — VALSARTAN 320 MG/1
320 TABLET ORAL DAILY
Qty: 90 TABLET | Refills: 3 | Status: SHIPPED | OUTPATIENT
Start: 2020-02-03 | End: 2020-02-06 | Stop reason: SDUPTHER

## 2020-02-03 NOTE — PROGRESS NOTES
Digital Medicine: Health  Follow-Up    The history is provided by the patient. No  was used.     Follow Up  Patient reports that he was started on some new medication about a month ago and attributes his better readings to this medication adjustment.    The patient reported that he is currently looking for a place to get his medication refilled because the pharmacy that he uses is either out of stock or unavailable.    Patient reports that his diet has not changed. The patient reports that he has been eating a lot more potatoes because it is the best food for potassium that he enjoys. Patient does report that for the last 8 days he has reached his step goal of 8,000 per day.    Intervention/Plan    There are no preventive care reminders to display for this patient.    Last 5 Patient Entered Readings                                      Current 30 Day Average: 125/82     Recent Readings 2/2/2020 2/1/2020 1/26/2020 1/25/2020 1/21/2020    SBP (mmHg) 127 113 139 117 126    DBP (mmHg) 84 72 82 74 78    Pulse 63 68 63 55 61             Screenings    SDOH

## 2020-02-03 NOTE — TELEPHONE ENCOUNTER
Digital Medicine PharmD contacted patient for follow-up today. Sent new prescription for [facility]artan [facility] to [facility] [address] as discussed in this encounter. No further needs per patient.

## 2020-02-03 NOTE — PROGRESS NOTES
Digital Medicine: Clinician Follow-Up    Blood pressures are well controlled.   Recent potassium level is stable and appropriate on current therapy (history of hypokalemia recently).     Troubles getting valsartan filled - will confer with MD in case of assistance needed.    The history is provided by the patient.     Follow Up  Follow-up reason(s): reading review      Readings are trending down due to medication adherence.        INTERVENTION(S)  encouragement/support    PLAN  continue monitoring    Blood pressures stable and well controlled.   Continue current regimen - send Rx to another pharmacy with valsartan available.     Will schedule next outreach in 3-4 months.       There are no preventive care reminders to display for this patient.    Last 5 Patient Entered Readings                                      Current 30 Day Average: 125/82     Recent Readings 2/2/2020 2/1/2020 1/26/2020 1/25/2020 1/21/2020    SBP (mmHg) 127 113 139 117 126    DBP (mmHg) 84 72 82 74 78    Pulse 63 68 63 55 61             Hypertension Medications             valsartan (DIOVAN) 320 MG tablet Take 1 tablet (320 mg total) by mouth once daily.                 Screenings

## 2020-02-06 ENCOUNTER — TELEPHONE (OUTPATIENT)
Dept: FAMILY MEDICINE | Facility: CLINIC | Age: 40
End: 2020-02-06

## 2020-02-06 DIAGNOSIS — I10 HYPERTENSION, UNSPECIFIED TYPE: ICD-10-CM

## 2020-02-06 RX ORDER — VALSARTAN 320 MG/1
320 TABLET ORAL DAILY
Qty: 90 TABLET | Refills: 3 | Status: SHIPPED | OUTPATIENT
Start: 2020-02-06 | End: 2021-03-18 | Stop reason: SDUPTHER

## 2020-02-06 NOTE — TELEPHONE ENCOUNTER
Received a fax from [facility] that patient is requesting his script for Valsartan to be sent there instead of the local [facility]. Please advise. [facility].

## 2020-02-17 ENCOUNTER — PATIENT OUTREACH (OUTPATIENT)
Dept: OTHER | Facility: OTHER | Age: 40
End: 2020-02-17

## 2020-02-17 NOTE — PROGRESS NOTES
Digital Medicine: Health  Follow-Up    The history is provided by the patient. No  was used.     Follow Up  Patient reports that he is doing well. He denies any concerns or questions at the current time.    Patient reports that he has been tolerating his new medication very well.    Will follow up with the patient in a few weeks.    Intervention/Plan    There are no preventive care reminders to display for this patient.    Last 5 Patient Entered Readings                                      Current 30 Day Average: 124/83     Recent Readings 2/17/2020 2/17/2020 2/13/2020 2/11/2020 2/11/2020    SBP (mmHg) 121 125 125 120 138    DBP (mmHg) 89 85 77 74 86    Pulse 56 54 46 56 60             Screenings    SDOH

## 2020-03-16 ENCOUNTER — PATIENT OUTREACH (OUTPATIENT)
Dept: OTHER | Facility: OTHER | Age: 40
End: 2020-03-16

## 2020-03-24 ENCOUNTER — PATIENT MESSAGE (OUTPATIENT)
Dept: FAMILY MEDICINE | Facility: CLINIC | Age: 40
End: 2020-03-24

## 2020-04-03 ENCOUNTER — PATIENT MESSAGE (OUTPATIENT)
Dept: FAMILY MEDICINE | Facility: CLINIC | Age: 40
End: 2020-04-03

## 2020-04-03 ENCOUNTER — OFFICE VISIT (OUTPATIENT)
Dept: FAMILY MEDICINE | Facility: CLINIC | Age: 40
End: 2020-04-03
Payer: COMMERCIAL

## 2020-04-03 VITALS
TEMPERATURE: 96 F | HEART RATE: 48 BPM | DIASTOLIC BLOOD PRESSURE: 74 MMHG | BODY MASS INDEX: 37.82 KG/M2 | WEIGHT: 271.19 LBS | SYSTOLIC BLOOD PRESSURE: 116 MMHG

## 2020-04-03 DIAGNOSIS — E87.6 HYPOKALEMIA: ICD-10-CM

## 2020-04-03 DIAGNOSIS — J45.990 EXERCISE-INDUCED ASTHMA: ICD-10-CM

## 2020-04-03 DIAGNOSIS — I10 ESSENTIAL HYPERTENSION: Primary | ICD-10-CM

## 2020-04-03 PROCEDURE — 99214 OFFICE O/P EST MOD 30 MIN: CPT | Mod: 95,,, | Performed by: INTERNAL MEDICINE

## 2020-04-03 PROCEDURE — 99214 PR OFFICE/OUTPT VISIT, EST, LEVL IV, 30-39 MIN: ICD-10-PCS | Mod: 95,,, | Performed by: INTERNAL MEDICINE

## 2020-04-03 RX ORDER — ALBUTEROL SULFATE 90 UG/1
2 POWDER, METERED RESPIRATORY (INHALATION) EVERY 6 HOURS PRN
Qty: 1 EACH | Refills: 1 | Status: SHIPPED | OUTPATIENT
Start: 2020-04-03 | End: 2022-09-29 | Stop reason: SDUPTHER

## 2020-04-03 NOTE — PATIENT INSTRUCTIONS
I think that your symptoms are from allergies. These can be seasonal, or you can be allergic to things in your home and have allergies all year long. There are many treatments available without a prescription to help with these symptoms.     Nasal rinses can help rinse out mucus, allergens, and irritants. You can buy a kit over the counter (I recommend [employer]) that allows you to spray salt water up into your nose and sinuses. Follow the instructions on whichever kit you buy, paying attention to using distilled, boiled, or bottled water. Tap water can contain a parasite that it dangerous to spray up into the nose. You can try using this twice a day.     Nasal steroid sprays are available over the counter and can be used longer term, but they can take about a week to get the full effect. Flonase (fluticasone), Rhinocort (budesonide), or Nasacort (triamcinolone) are the most common examples. You should start using 2 sprays in each nostril for 1 week, then using 1 spray in each nostril each night. To correctly use the spray, lean forward and aim the spray toward the ear on that side. It can be helpful to use the opposite arm for each side to aim correctly. The main side effect of these sprays is drying out of the nose.     You can also try using an over the counter allergy pill such as Zyrtec (cetirizine), Allegra (fexofenadine), or Claritin (loratadine).     If you also have itchy eyes, some good over the counter eye drops are called Alaway and Zaditor. These can be slightly more expensive than other over the counter eye drops, but they work well and a small bottle can last a long time.

## 2020-04-03 NOTE — PROGRESS NOTES
Assessment and Plan:    1. Essential hypertension  Controlled, will plan to continue current medication.   - Basic metabolic panel; Future    2. Hypokalemia  - Basic metabolic panel; Future    3. Exercise-induced asthma  - PROAIR RESPICLICK 90 mcg/actuation inhaler; Inhale 2 puffs into the lungs every 6 (six) hours as needed for Wheezing or Shortness of Breath.  Dispense: 1 each; Refill: 1    ______________________________________________________________________  Subjective:    Chief Complaint:  Follow up HTN    HPI:  Chad is a 40 y.o. year old male with telemedicine visit today as consultation for follow up of HTN    The patient location is: Home  The chief complaint leading to consultation is: follow up HTN  Visit type: Virtual visit with synchronous audio and video  Total time spent with patient: 8:45-9:00 with additional time for charting  Each patient to whom he or she provides medical services by telemedicine is:  (1) informed of the relationship between the physician and patient and the respective role of any other health care provider with respect to management of the patient; and (2) notified that he or she may decline to receive medical services by telemedicine and may withdraw from such care at any time.    He has been monitoring BP at home and overall has been controlled, has predominantly been 110s-120s/60s-70s. Occasional higher readings, but this has been more rare.     He has not had any side effects on this medication. Has been able to get this from the mail order pharmacy and has not had any problems recently with this having shortages.     He has still been working on eating potassium rich foods. Has not had any of the symptoms that he had previously with hypokalemia.     He has not had any wheezing or shortness of breath recently. Does have a history of exercise induced bronchospasm. His inhaler is going to  this month.     Medications:  Current Outpatient Medications on File Prior to Visit    Medication Sig Dispense Refill    om 3/E/linol/ala/oleic/gla/lip (OMEGA 3-6-9 ORAL) Take 1 capsule by mouth once daily.      PROAIR RESPICLICK 90 mcg/actuation AePB Inhale 2 puffs into the lungs daily as needed.      valsartan (DIOVAN) 320 MG tablet Take 1 tablet (320 mg total) by mouth once daily. 90 tablet 3     No current facility-administered medications on file prior to visit.        Review of Systems:  Review of Systems   Constitutional: Negative for chills and fever.   Respiratory: Negative for shortness of breath.    Cardiovascular: Negative for chest pain and palpitations.   Musculoskeletal: Negative for neck pain.   Neurological: Negative for headaches.     Entered by patient and reviewed and updated during visit      Past Medical History:  Past Medical History:   Diagnosis Date    Asthma     exercise induced    Cardiac murmur     Dyslipidemia     Hypertension     Obesity     Seasonal and perennial allergic rhinitis        Objective:    Vitals:  Vitals:    04/03/20 0848   BP: 116/74   Pulse: (!) 48   Temp: (!) 95.8 °F (35.4 °C)   Weight: 123 kg (271 lb 3.2 oz)       Physical Exam   Constitutional: He is oriented to person, place, and time. He appears well-developed and well-nourished. No distress.   HENT:   Head: Normocephalic and atraumatic.   Pulmonary/Chest: Effort normal. No respiratory distress.   Neurological: He is alert and oriented to person, place, and time.   Skin: He is not diaphoretic.   Psychiatric: He has a normal mood and affect. His behavior is normal. Judgment and thought content normal.       Data:  Previous labs reviewed and pertinent for Cr normal, K 3.8.      Bronwyn Brandt MD  Internal Medicine

## 2020-04-13 NOTE — PROGRESS NOTES
Digital Medicine: Health  Follow-Up    The history is provided by the patient. No  was used.     Follow Up  Patient reported that he is doing well.    He stated that he worked from home before COVID-19 started. He stated he does not have ay added stress besides his daughter home. He expressed that he is still staying active and denies any further questions or concerns.    Will follow up with the patient in a few weeks.      Intervention/Plan    There are no preventive care reminders to display for this patient.    Last 5 Patient Entered Readings                                      Current 30 Day Average: 121/81     Recent Readings 4/13/2020 4/13/2020 4/13/2020 4/8/2020 4/8/2020    SBP (mmHg) 125 116 137 122 141    DBP (mmHg) 89 92 85 86 88    Pulse 49 54 49 50 48                  Screenings    SDOH

## 2020-04-16 ENCOUNTER — PATIENT MESSAGE (OUTPATIENT)
Dept: OTHER | Facility: OTHER | Age: 40
End: 2020-04-16

## 2020-05-06 ENCOUNTER — PATIENT MESSAGE (OUTPATIENT)
Dept: ADMINISTRATIVE | Facility: HOSPITAL | Age: 40
End: 2020-05-06

## 2020-05-11 ENCOUNTER — PATIENT MESSAGE (OUTPATIENT)
Dept: ADMINISTRATIVE | Facility: OTHER | Age: 40
End: 2020-05-11

## 2020-05-19 ENCOUNTER — PATIENT OUTREACH (OUTPATIENT)
Dept: OTHER | Facility: OTHER | Age: 40
End: 2020-05-19

## 2020-05-19 NOTE — PROGRESS NOTES
Digital Medicine: Clinician Follow-Up    Routine outreach for intermittently controlled HTN patient.     Patient reports doing well. Improvement in stress and sleep since last outreach. Patient reports that he also notices his readings are better when he maintains a higher potassium intake.     Reviewed blood pressure log with patient. Note improvement from prior outreach with recent increase in DBPs. Encouraged sodium reduction. Patient continues to refrain from table salt use but is not mindful of sodium in foods. Discussed the impact sodium reduction can have on low potassium levels as well.     Will continue with current regimen. Discussed need for DBP improvement or possible consideration fo amlodipine addition at next outreach.     Will schedule next outreach in 2-4 months.     The history is provided by the patient.     Follow Up  Follow-up reason(s): reading review      Readings are trending down due to lifestyle change.        INTERVENTION(S)  recommended diet modifications and encouragement/support    PLAN  continue monitoring      There are no preventive care reminders to display for this patient.    Last 5 Patient Entered Readings                                      Current 30 Day Average: 121/83     Recent Readings 5/16/2020 5/16/2020 5/12/2020 5/9/2020 5/9/2020    SBP (mmHg) 125 142 120 112 126    DBP (mmHg) 93 88 83 74 81    Pulse 56 55 49 52 55             Hypertension Medications             valsartan (DIOVAN) 320 MG tablet Take 1 tablet (320 mg total) by mouth once daily.                 Screenings

## 2020-05-26 ENCOUNTER — PATIENT OUTREACH (OUTPATIENT)
Dept: OTHER | Facility: OTHER | Age: 40
End: 2020-05-26

## 2020-05-26 NOTE — PROGRESS NOTES
Digital Medicine: Health  Follow-Up    The history is provided by the patient. No  was used.     Follow Up  Called patient for routine follow up.    Patient reported that he was changing a dead battery in his car when he answered. Patient reports that he is doing well and he reports that he denied any issues or concerns in regards to his hypertension.    Patient expressed that he is doing fine and does not have any further questions at this time.    Will follow up with the patient in a few weeks.      Intervention/Plan    There are no preventive care reminders to display for this patient.    Last 5 Patient Entered Readings                                      Current 30 Day Average: 118/82     Recent Readings 5/24/2020 5/24/2020 5/21/2020 5/16/2020 5/16/2020    SBP (mmHg) 112 126 124 125 142    DBP (mmHg) 82 79 80 93 88    Pulse 51 51 49 56 55                  Screenings    SDOH

## 2020-06-01 ENCOUNTER — PATIENT MESSAGE (OUTPATIENT)
Dept: OTHER | Facility: OTHER | Age: 40
End: 2020-06-01

## 2020-07-06 ENCOUNTER — LAB VISIT (OUTPATIENT)
Dept: LAB | Facility: HOSPITAL | Age: 40
End: 2020-07-06
Attending: INTERNAL MEDICINE
Payer: COMMERCIAL

## 2020-07-06 DIAGNOSIS — I10 ESSENTIAL HYPERTENSION: ICD-10-CM

## 2020-07-06 DIAGNOSIS — E87.6 HYPOKALEMIA: ICD-10-CM

## 2020-07-06 LAB
ALBUMIN SERPL BCP-MCNC: 3.9 G/DL (ref 3.5–5.2)
ALP SERPL-CCNC: 103 U/L (ref 55–135)
ALT SERPL W/O P-5'-P-CCNC: 32 U/L (ref 10–44)
ANION GAP SERPL CALC-SCNC: 9 MMOL/L (ref 8–16)
ANION GAP SERPL CALC-SCNC: 9 MMOL/L (ref 8–16)
AST SERPL-CCNC: 18 U/L (ref 10–40)
BILIRUB SERPL-MCNC: 0.4 MG/DL (ref 0.1–1)
BUN SERPL-MCNC: 21 MG/DL (ref 6–20)
BUN SERPL-MCNC: 21 MG/DL (ref 6–20)
CALCIUM SERPL-MCNC: 9.4 MG/DL (ref 8.7–10.5)
CALCIUM SERPL-MCNC: 9.4 MG/DL (ref 8.7–10.5)
CHLORIDE SERPL-SCNC: 107 MMOL/L (ref 95–110)
CHLORIDE SERPL-SCNC: 107 MMOL/L (ref 95–110)
CO2 SERPL-SCNC: 25 MMOL/L (ref 23–29)
CO2 SERPL-SCNC: 25 MMOL/L (ref 23–29)
CREAT SERPL-MCNC: 1 MG/DL (ref 0.5–1.4)
CREAT SERPL-MCNC: 1 MG/DL (ref 0.5–1.4)
EST. GFR  (AFRICAN AMERICAN): >60 ML/MIN/1.73 M^2
EST. GFR  (AFRICAN AMERICAN): >60 ML/MIN/1.73 M^2
EST. GFR  (NON AFRICAN AMERICAN): >60 ML/MIN/1.73 M^2
EST. GFR  (NON AFRICAN AMERICAN): >60 ML/MIN/1.73 M^2
GLUCOSE SERPL-MCNC: 88 MG/DL (ref 70–110)
GLUCOSE SERPL-MCNC: 88 MG/DL (ref 70–110)
MAGNESIUM SERPL-MCNC: 1.9 MG/DL (ref 1.6–2.6)
POTASSIUM SERPL-SCNC: 3.7 MMOL/L (ref 3.5–5.1)
POTASSIUM SERPL-SCNC: 3.7 MMOL/L (ref 3.5–5.1)
PROT SERPL-MCNC: 7.2 G/DL (ref 6–8.4)
SODIUM SERPL-SCNC: 141 MMOL/L (ref 136–145)
SODIUM SERPL-SCNC: 141 MMOL/L (ref 136–145)

## 2020-07-06 PROCEDURE — 80053 COMPREHEN METABOLIC PANEL: CPT

## 2020-07-06 PROCEDURE — 83735 ASSAY OF MAGNESIUM: CPT

## 2020-07-06 PROCEDURE — 36415 COLL VENOUS BLD VENIPUNCTURE: CPT | Mod: PN

## 2020-07-09 ENCOUNTER — OFFICE VISIT (OUTPATIENT)
Dept: FAMILY MEDICINE | Facility: CLINIC | Age: 40
End: 2020-07-09
Payer: COMMERCIAL

## 2020-07-09 VITALS
SYSTOLIC BLOOD PRESSURE: 134 MMHG | HEIGHT: 71 IN | TEMPERATURE: 98 F | OXYGEN SATURATION: 98 % | DIASTOLIC BLOOD PRESSURE: 88 MMHG | HEART RATE: 59 BPM | RESPIRATION RATE: 18 BRPM | BODY MASS INDEX: 38.64 KG/M2 | WEIGHT: 276 LBS

## 2020-07-09 DIAGNOSIS — E87.6 HYPOKALEMIA: Primary | ICD-10-CM

## 2020-07-09 DIAGNOSIS — Z00.00 PREVENTATIVE HEALTH CARE: ICD-10-CM

## 2020-07-09 PROCEDURE — 99999 PR PBB SHADOW E&M-EST. PATIENT-LVL III: ICD-10-PCS | Mod: PBBFAC,,, | Performed by: INTERNAL MEDICINE

## 2020-07-09 PROCEDURE — 99214 OFFICE O/P EST MOD 30 MIN: CPT | Mod: S$GLB,,, | Performed by: INTERNAL MEDICINE

## 2020-07-09 PROCEDURE — 99999 PR PBB SHADOW E&M-EST. PATIENT-LVL III: CPT | Mod: PBBFAC,,, | Performed by: INTERNAL MEDICINE

## 2020-07-09 PROCEDURE — 99214 PR OFFICE/OUTPT VISIT, EST, LEVL IV, 30-39 MIN: ICD-10-PCS | Mod: S$GLB,,, | Performed by: INTERNAL MEDICINE

## 2020-07-09 RX ORDER — POTASSIUM CHLORIDE 20 MEQ/1
20 TABLET, EXTENDED RELEASE ORAL DAILY
Qty: 90 TABLET | Refills: 1 | Status: SHIPPED | OUTPATIENT
Start: 2020-07-09 | End: 2020-08-31

## 2020-07-09 NOTE — PROGRESS NOTES
"Assessment and Plan:    1. Hypokalemia  Discussed symptoms in detail. We have discussed that his symptoms are no necessarily typical for hypokalemia, but he reports that they always resolve with potassium intake so he may just have an atypical symptom profile with hypokalemia. It is also not clear to me why he continues to have borderline low K despite adequate repletion, no use of medications known to cause hypokalemia, and actually taking high dose ARB. Discussed options including seeing nephrology, but patient would like to defer at this time. Discussed starting to take daily K supplement and if symptoms resolve with supplementation, then would just continue this.  - potassium chloride SA (K-DUR,KLOR-CON) 20 MEQ tablet; Take 1 tablet (20 mEq total) by mouth once daily.  Dispense: 90 tablet; Refill: 1    2. Preventative health care  Labs before annual in 6 months  - Comprehensive metabolic panel; Future  - CBC auto differential; Future  - Lipid Panel; Future  - TSH; Future  - Hemoglobin A1C; Future  - Vitamin D; Future  - Magnesium; Future      ______________________________________________________________________  Subjective:    Chief Complaint:  Concern for hypokalemia    HPI:  Chad is a 40 y.o. year old male here with concern for hypokalemia.     He had an episode of hypokalemia in December 2019 with bloating, weakness, K 2.8.     He has been having similar symptoms in the last few weeks with the bloating, sensation of stomach "gurgling" and some generalized abdominal discomfort (not pain). Feels like some spasming in his gut.     He has had some pain in his back intermittently, seems to come and go and will be in various places.     He reports that whenever he has any of these symptoms, eating something high in potassium (such as a potato) will cause all symptoms to resolve within an hour or so. He reports that he feels like he is eating too many potatoes to try to avoid these symptoms. " "      Medications:  Current Outpatient Medications on File Prior to Visit   Medication Sig Dispense Refill    om 3/E/linol/ala/oleic/gla/lip (OMEGA 3-6-9 ORAL) Take 1 capsule by mouth once daily.      PROAIR RESPICLICK 90 mcg/actuation inhaler Inhale 2 puffs into the lungs every 6 (six) hours as needed for Wheezing or Shortness of Breath. 1 each 1    valsartan (DIOVAN) 320 MG tablet Take 1 tablet (320 mg total) by mouth once daily. 90 tablet 3     No current facility-administered medications on file prior to visit.        Review of Systems:  Review of Systems   Constitutional: Negative for fever.   Cardiovascular: Negative for chest pain.   Gastrointestinal: Negative for abdominal pain.   Genitourinary: Negative for dysuria and hematuria.   Musculoskeletal: Positive for back pain.   Neurological: Negative for weakness, numbness and headaches.     Entered by patient and reviewed and updated during visit      Past Medical History:  Past Medical History:   Diagnosis Date    Asthma     exercise induced    Cardiac murmur     Dyslipidemia     Hypertension     Obesity     Seasonal and perennial allergic rhinitis        Objective:    Vitals:  Vitals:    07/09/20 1029   BP: 134/88   Pulse: (!) 59   Resp: 18   Temp: 97.9 °F (36.6 °C)   TempSrc: Temporal   SpO2: 98%   Weight: 125.2 kg (276 lb 0.3 oz)   Height: 5' 11" (1.803 m)   PainSc:   4   PainLoc: Back       Physical Exam  Vitals signs reviewed.   Constitutional:       General: He is not in acute distress.     Appearance: He is well-developed.   Eyes:      Conjunctiva/sclera: Conjunctivae normal.   Cardiovascular:      Rate and Rhythm: Normal rate and regular rhythm.   Pulmonary:      Effort: Pulmonary effort is normal. No respiratory distress.   Skin:     General: Skin is warm and dry.   Neurological:      Mental Status: He is alert and oriented to person, place, and time.   Psychiatric:         Behavior: Behavior normal.         Data:  Previous labs] reviewed " and pertinent for K 3.7, Mg 1.9.      Bronwyn Brandt MD  Internal Medicine

## 2020-07-28 ENCOUNTER — PATIENT OUTREACH (OUTPATIENT)
Dept: OTHER | Facility: OTHER | Age: 40
End: 2020-07-28

## 2020-07-28 DIAGNOSIS — I10 HYPERTENSION, UNSPECIFIED TYPE: Primary | ICD-10-CM

## 2020-07-28 NOTE — PROGRESS NOTES
Digital Medicine: Clinician Follow-Up    Routine outreach.     Patient reports doing well.     Reviewed blood pressure readings with patient. Noted recent trend up. Patient denies change in diet but feels decreased activity with recent rain may be contributing. Patient is also concerned his device battery may be too low as well. Discussed prior outreach discussion recommending sodium reduction in diet - patient reports making no changes to sodium.     Reviewed possible need for amlodipine addition for consistent DBP elevation. Patient declined, but agreeable to follow-up in a few weeks to monitor for improvement after charging and increasing activity levels again.     The history is provided by the patient.   Follow-up reason(s): routine follow up.     Readings are trending up   due to lifestyle change and inaccurate technique.        Last 5 Patient Entered Readings                                      Current 30 Day Average: 126/86     Recent Readings 7/26/2020 7/26/2020 7/24/2020 7/24/2020 7/24/2020    SBP (mmHg) 133 134 131 126 140    DBP (mmHg) 86 88 87 84 95    Pulse 53 58 56 56 60             Screenings    ASSESSMENT(S)  Patients BP average is 126/86 mmHg, which is above goal. Patient's BP goal is less than or equal to 130/80 per 2017 ACC/AHA Hypertension Guidelines.       PLAN  Additional monitoring needed: Will monitor soon for improvement in readings.   Continue current therapy: Recommended med change - declined   Continue current diet/physical activity routine: Patient to return to prior activity  Instructed to charge device:  Patient declined medication changes: Recommended additiona of amlodipine as DBPs have remained consistently elevated. Patient declined in favor of charging and returning to prior activity level.   Provided patient education:    Patient verbalizes understanding. Patient did not express questions or concerns and patient has contact information if needed.          There are no  preventive care reminders to display for this patient.      Hypertension Medications             valsartan (DIOVAN) 320 MG tablet Take 1 tablet (320 mg total) by mouth once daily.

## 2020-08-31 ENCOUNTER — PATIENT MESSAGE (OUTPATIENT)
Dept: FAMILY MEDICINE | Facility: CLINIC | Age: 40
End: 2020-08-31

## 2020-08-31 DIAGNOSIS — E87.6 HYPOKALEMIA: Primary | ICD-10-CM

## 2020-08-31 RX ORDER — POTASSIUM CHLORIDE 750 MG/1
10 TABLET, EXTENDED RELEASE ORAL 2 TIMES DAILY
Qty: 60 TABLET | Refills: 2 | Status: SHIPPED | OUTPATIENT
Start: 2020-08-31 | End: 2020-11-09 | Stop reason: SDUPTHER

## 2020-09-04 ENCOUNTER — PATIENT OUTREACH (OUTPATIENT)
Dept: OTHER | Facility: OTHER | Age: 40
End: 2020-09-04

## 2020-09-04 DIAGNOSIS — I10 HYPERTENSION, UNSPECIFIED TYPE: Primary | ICD-10-CM

## 2020-09-14 ENCOUNTER — PATIENT MESSAGE (OUTPATIENT)
Dept: FAMILY MEDICINE | Facility: CLINIC | Age: 40
End: 2020-09-14

## 2020-09-14 ENCOUNTER — OFFICE VISIT (OUTPATIENT)
Dept: FAMILY MEDICINE | Facility: CLINIC | Age: 40
End: 2020-09-14
Payer: COMMERCIAL

## 2020-09-14 VITALS
HEIGHT: 71 IN | DIASTOLIC BLOOD PRESSURE: 92 MMHG | TEMPERATURE: 98 F | RESPIRATION RATE: 18 BRPM | HEART RATE: 74 BPM | WEIGHT: 284.63 LBS | SYSTOLIC BLOOD PRESSURE: 154 MMHG | OXYGEN SATURATION: 96 % | BODY MASS INDEX: 39.85 KG/M2

## 2020-09-14 DIAGNOSIS — G56.02 CARPAL TUNNEL SYNDROME OF LEFT WRIST: Primary | ICD-10-CM

## 2020-09-14 PROCEDURE — 90471 IMMUNIZATION ADMIN: CPT | Mod: S$GLB,,, | Performed by: INTERNAL MEDICINE

## 2020-09-14 PROCEDURE — 99999 PR PBB SHADOW E&M-EST. PATIENT-LVL IV: CPT | Mod: PBBFAC,,, | Performed by: INTERNAL MEDICINE

## 2020-09-14 PROCEDURE — 99214 OFFICE O/P EST MOD 30 MIN: CPT | Mod: 25,S$GLB,, | Performed by: INTERNAL MEDICINE

## 2020-09-14 PROCEDURE — 99999 PR PBB SHADOW E&M-EST. PATIENT-LVL IV: ICD-10-PCS | Mod: PBBFAC,,, | Performed by: INTERNAL MEDICINE

## 2020-09-14 PROCEDURE — 90686 FLU VACCINE (QUAD) GREATER THAN OR EQUAL TO 3YO PRESERVATIVE FREE IM: ICD-10-PCS | Mod: S$GLB,,, | Performed by: INTERNAL MEDICINE

## 2020-09-14 PROCEDURE — 90686 IIV4 VACC NO PRSV 0.5 ML IM: CPT | Mod: S$GLB,,, | Performed by: INTERNAL MEDICINE

## 2020-09-14 PROCEDURE — 99214 PR OFFICE/OUTPT VISIT, EST, LEVL IV, 30-39 MIN: ICD-10-PCS | Mod: 25,S$GLB,, | Performed by: INTERNAL MEDICINE

## 2020-09-14 PROCEDURE — 90471 FLU VACCINE (QUAD) GREATER THAN OR EQUAL TO 3YO PRESERVATIVE FREE IM: ICD-10-PCS | Mod: S$GLB,,, | Performed by: INTERNAL MEDICINE

## 2020-09-14 RX ORDER — MELOXICAM 15 MG/1
15 TABLET ORAL DAILY
Qty: 14 TABLET | Refills: 1 | Status: SHIPPED | OUTPATIENT
Start: 2020-09-14 | End: 2020-09-28

## 2020-09-14 NOTE — PROGRESS NOTES
"Assessment and Plan:    1. Carpal tunnel syndrome of left wrist  Symptoms and exam more consistent with peripheral etiology rather than cervical radiculopathy. Discussed treatment for CTS including brace, NSAIDs, and seeing hand surgery for likely injection given severity of symptoms currently.  - Ambulatory referral/consult to Orthopedics; Future      ______________________________________________________________________  Subjective:    Chief Complaint:  Left hand pain    HPI:  Chad is a 40 y.o. year old male here for evaluation of left hand pain.     He reports that he started having pain in his left hadn about 1 week ago. He reports that he had not had any trauma or injury to this area. Mainly in the lateral part of his hand and half of his middle finger. Describes this as shooting, stabbing, "ice picks.". Not achy. He has been trying to think of any changes to his activity. He has gained some weight recently. He had similar symptoms years ago and had an EMG at that time which he reports was normal.     He has been checking his BP at home and he notes that it has been slightly higher than before, but still never >140 systolic. Diastolic has been usually in the mid 80s, but has been above 90 ~25% of the time. He takes the valsartan every night and did take it last night.     Medications:  Current Outpatient Medications on File Prior to Visit   Medication Sig Dispense Refill    om 3/E/linol/ala/oleic/gla/lip (OMEGA 3-6-9 ORAL) Take 1 capsule by mouth once daily.      potassium chloride (KLOR-CON) 10 MEQ TbSR Take 1 tablet (10 mEq total) by mouth 2 (two) times daily. 60 tablet 2    PROAIR RESPICLICK 90 mcg/actuation inhaler Inhale 2 puffs into the lungs every 6 (six) hours as needed for Wheezing or Shortness of Breath. 1 each 1    valsartan (DIOVAN) 320 MG tablet Take 1 tablet (320 mg total) by mouth once daily. 90 tablet 3     No current facility-administered medications on file prior to visit.        Review of " "Systems:  Review of Systems   Constitutional: Negative for activity change and unexpected weight change.   HENT: Negative for hearing loss, rhinorrhea and trouble swallowing.    Eyes: Negative for discharge and visual disturbance.   Respiratory: Negative for chest tightness and wheezing.    Cardiovascular: Negative for chest pain and palpitations.   Gastrointestinal: Negative for blood in stool, constipation, diarrhea and vomiting.   Endocrine: Negative for polydipsia and polyuria.   Genitourinary: Negative for difficulty urinating, hematuria and urgency.   Musculoskeletal: Positive for arthralgias and neck pain. Negative for joint swelling.   Neurological: Negative for weakness and headaches.   Psychiatric/Behavioral: Negative for confusion and dysphoric mood.     Entered by patient and reviewed and updated during visit      Past Medical History:  Past Medical History:   Diagnosis Date    Asthma     exercise induced    Cardiac murmur     Dyslipidemia     Hypertension     Obesity     Seasonal and perennial allergic rhinitis        Objective:    Vitals:  Vitals:    09/14/20 0754 09/14/20 0828   BP: (!) 160/94 (!) 154/92   Pulse: 74    Resp: 18    Temp: 98.1 °F (36.7 °C)    TempSrc: Temporal    SpO2: 96%    Weight: 129.1 kg (284 lb 9.8 oz)    Height: 5' 11" (1.803 m)    PainSc:   6    PainLoc: Hand        Physical Exam  Vitals signs reviewed.   Constitutional:       General: He is not in acute distress.     Appearance: He is well-developed.   Eyes:      Conjunctiva/sclera: Conjunctivae normal.   Cardiovascular:      Rate and Rhythm: Normal rate and regular rhythm.   Pulmonary:      Effort: Pulmonary effort is normal. No respiratory distress.   Musculoskeletal:      Comments: left hand with area of pain encompassing wrist to finger tips from lateral border of hand to mid way through 3rd digit; pain worsens with Phalen test, Tinels test is negative   Skin:     General: Skin is warm and dry.   Neurological:      " Mental Status: He is alert and oriented to person, place, and time.   Psychiatric:         Behavior: Behavior normal.         Data:  Previous labs reviewed and pertinent for Cr 1.0.      Bronwyn Brandt MD  Internal Medicine

## 2020-09-15 RX ORDER — AMLODIPINE BESYLATE 2.5 MG/1
2.5 TABLET ORAL DAILY
Qty: 30 TABLET | Refills: 1 | Status: SHIPPED | OUTPATIENT
Start: 2020-09-15 | End: 2020-10-08

## 2020-09-15 NOTE — PROGRESS NOTES
Digital Medicine: Clinician Follow-Up    Patient reports doing well. Does note some issues with carpal tunnel recently which have resulted in NSAID use (OTC until today started on Mobic).     Notes he has been more sedentary recently and has gained weight during pandemic lifestyle changes.     Patient additionally note troubles staying asleep. Has previously tried 6 months of sleep hygiene without benefit. Uses melatonin PRN for trouble falling asleep but this is not frequent. Usually has issues staying asleep. Denies snoring since adenoidectomy and tonsillectomy. Denies waking and gasping for air. Does breathe heavily at night.     The history is provided by the patient.      Review of patient's allergies indicates:   -- Inderal (propranolol) -- Hives   -- Lisinopril -- Other (See Comments)    --  cough  Follow-up reason(s): routine follow up.     Hypertension    Patient readings are stable       Last 5 Patient Entered Readings                                      Current 30 Day Average: 133/87     Recent Readings 9/11/2020 9/11/2020 9/10/2020 9/10/2020 9/10/2020    SBP (mmHg) 134 142 146 145 150    DBP (mmHg) 86 86 72 84 89    Pulse 52 50 57 56 58               Screenings    ASSESSMENT(S)  Patients BP average is 133/87 mmHg, which is above goal. Patient's BP goal is less than or equal to 130/80 per 2017 ACC/AHA Hypertension Guidelines.     DBPs are consistently elevated at home. Would benefit from improved sleep patterns - recommended patient reach out to PCP to discuss further - discussed impact sleep quality or sleep apnea has on blood pressure.       Hypertension Plan  Medication change. Start amlodipine 2.5mg daily.   Continue current diet/physical activity routine.  Provided patient education.  - Patient educated on expectations of amlodipine and possible ADRs.     Will follow-up in 2 weeks.      Addressed patient questions and patient has my contact information if needed prior to next outreach. Patient  verbalizes understanding.            There are no preventive care reminders to display for this patient.  There are no preventive care reminders to display for this patient.    Hypertension Medications             valsartan (DIOVAN) 320 MG tablet Take 1 tablet (320 mg total) by mouth once daily.

## 2020-09-16 ENCOUNTER — PATIENT OUTREACH (OUTPATIENT)
Dept: OTHER | Facility: OTHER | Age: 40
End: 2020-09-16

## 2020-09-16 NOTE — PROGRESS NOTES
Digital Medicine: Health  Follow-Up    The history is provided by the patient.             Reason for review: Blood pressure not at goal      Additional Follow-up details: Patient reports that he has been dealing with pain in his hand. He expressed that his doctor thinks it may be carpal tunnel. He reports that this has been causing some pain and that in turn has increased his BP.    He expressed that he has an appointment with a hand surgeon to see if there is anything that he can do.          Diet-Not assessed          Physical Activity-no change to routine  No change to exercise routine.       Additional physical activity details: Patient reports that he has not been exercising much as of late. He expressed that he knows he needs to get back into exercising.      Medication Adherence-Medication adherence was asssessed.  Patient continue taking medication as prescribed.          Patient reports that he got a new medication added to his regimen from his clinician.    Patient reports that he is taking his first dose today.      Additional monitoring needed.       Addressed patient questions and patient has my contact information if needed prior to next outreach. Patient verbalizes understanding.      Explained the importance of self-monitoring and medication adherence. Encouraged the patient to communicate with their health  for lifestyle modifications to help improve or maintain a healthy lifestyle.            There are no preventive care reminders to display for this patient.    Last 5 Patient Entered Readings                                      Current 30 Day Average: 135/89     Recent Readings 9/16/2020 9/16/2020 9/11/2020 9/11/2020 9/10/2020    SBP (mmHg) 142 147 134 142 146    DBP (mmHg) 88 110 86 86 72    Pulse 48 47 52 50 57

## 2020-09-28 ENCOUNTER — CLINICAL SUPPORT (OUTPATIENT)
Dept: FAMILY MEDICINE | Facility: CLINIC | Age: 40
End: 2020-09-28
Payer: COMMERCIAL

## 2020-09-28 VITALS — DIASTOLIC BLOOD PRESSURE: 94 MMHG | SYSTOLIC BLOOD PRESSURE: 145 MMHG

## 2020-09-28 DIAGNOSIS — I10 ESSENTIAL HYPERTENSION: Primary | ICD-10-CM

## 2020-09-28 PROCEDURE — 99999 PR PBB SHADOW E&M-EST. PATIENT-LVL I: CPT | Mod: PBBFAC,,,

## 2020-09-28 PROCEDURE — 99999 PR PBB SHADOW E&M-EST. PATIENT-LVL I: ICD-10-PCS | Mod: PBBFAC,,,

## 2020-09-28 NOTE — PROGRESS NOTES
Chad Chen 40 y.o. male is here today for Blood Pressure check.   History of HTN YES    Review of patient's allergies indicates:   Allergen Reactions    Inderal [propranolol] Hives    Lisinopril Other (See Comments)     cough     Creatinine   Date Value Ref Range Status   07/06/2020 1.0 0.5 - 1.4 mg/dL Final   07/06/2020 1.0 0.5 - 1.4 mg/dL Final     Sodium   Date Value Ref Range Status   07/06/2020 141 136 - 145 mmol/L Final   07/06/2020 141 136 - 145 mmol/L Final     Potassium   Date Value Ref Range Status   07/06/2020 3.7 3.5 - 5.1 mmol/L Final   07/06/2020 3.7 3.5 - 5.1 mmol/L Final   ]  Patient IS taking blood pressure medications on a regular basis at the same time of the day.     Current Outpatient Medications:     amLODIPine (NORVASC) 2.5 MG tablet, Take 1 tablet (2.5 mg total) by mouth once daily., Disp: 30 tablet, Rfl: 1    meloxicam (MOBIC) 15 MG tablet, Take 1 tablet (15 mg total) by mouth once daily. for 14 days, Disp: 14 tablet, Rfl: 1    om 3/E/linol/ala/oleic/gla/lip (OMEGA 3-6-9 ORAL), Take 1 capsule by mouth once daily., Disp: , Rfl:     potassium chloride (KLOR-CON) 10 MEQ TbSR, Take 1 tablet (10 mEq total) by mouth 2 (two) times daily., Disp: 60 tablet, Rfl: 2    PROAIR RESPICLICK 90 mcg/actuation inhaler, Inhale 2 puffs into the lungs every 6 (six) hours as needed for Wheezing or Shortness of Breath., Disp: 1 each, Rfl: 1    valsartan (DIOVAN) 320 MG tablet, Take 1 tablet (320 mg total) by mouth once daily., Disp: 90 tablet, Rfl: 3  Does patient have record of home blood pressure readings NO Readings have been averaging 120/80  Last dose of blood pressure medication was taken at 8:00 PM LAST NIGHT  Patient is ASYMPTOMATIC

## 2020-09-29 ENCOUNTER — PATIENT OUTREACH (OUTPATIENT)
Dept: OTHER | Facility: OTHER | Age: 40
End: 2020-09-29

## 2020-09-29 NOTE — PROGRESS NOTES
Digital Medicine: Clinician Follow-Up    At last outreach, started amlodipine 2.5mg daily. Patient reports compliance and tolerability.     Patient still dealing with carpal tunnel. Has upcoming appt with hand specialist. Expects he may get a steroid injection. Continues to use meloxicam.       The history is provided by the patient.   Follow-up reason(s): medication change follow-up.     Hypertension    Readings are trending down due to medication adherence.       Patient did make medication change.    Is patient tolerating med change? yes          Last 5 Patient Entered Readings                                      Current 30 Day Average: 134/87     Recent Readings 9/28/2020 9/27/2020 9/27/2020 9/27/2020 9/27/2020    SBP (mmHg) 145 126 118 114 137    DBP (mmHg) 94 81 71 82 91    Pulse 80 54 50 54 53               Screenings    ASSESSMENT(S)  Patients BP average is 134/87 mmHg, which is above goal. Patient's BP goal is less than or equal to 130/80 per 2017 ACC/AHA Hypertension Guidelines.     BP average remains uncontrolled. BP has trended down and is often at or near goal. Could benefit from titration to amlodipine 5mg. Patient declines at this time in favor of waiting to see if improvement in hand pain results in BP improvement.           Hypertension Plan  Continue current therapy.  Continue current diet/physical activity routine.  Patient declined medication changes.  Provided patient education.  - Discussed impact of pain and NSAID use on Bps. Discussed impact of steroid injection on Bps.       Will follow-up in 2-4 weeks to monitor readings and discuss possible amlodipine titration if needed.      Addressed patient questions and patient has my contact information if needed prior to next outreach. Patient verbalizes understanding.            There are no preventive care reminders to display for this patient.  There are no preventive care reminders to display for this patient.    Hypertension Medications              amLODIPine (NORVASC) 2.5 MG tablet Take 1 tablet (2.5 mg total) by mouth once daily.    valsartan (DIOVAN) 320 MG tablet Take 1 tablet (320 mg total) by mouth once daily.

## 2020-10-05 ENCOUNTER — PATIENT MESSAGE (OUTPATIENT)
Dept: ORTHOPEDICS | Facility: CLINIC | Age: 40
End: 2020-10-05

## 2020-10-05 ENCOUNTER — OFFICE VISIT (OUTPATIENT)
Dept: ORTHOPEDICS | Facility: CLINIC | Age: 40
End: 2020-10-05
Payer: COMMERCIAL

## 2020-10-05 VITALS
DIASTOLIC BLOOD PRESSURE: 95 MMHG | BODY MASS INDEX: 39.85 KG/M2 | HEART RATE: 72 BPM | WEIGHT: 284.63 LBS | SYSTOLIC BLOOD PRESSURE: 158 MMHG | HEIGHT: 71 IN

## 2020-10-05 DIAGNOSIS — G56.02 CARPAL TUNNEL SYNDROME OF LEFT WRIST: Primary | ICD-10-CM

## 2020-10-05 DIAGNOSIS — G56.02 CARPAL TUNNEL SYNDROME OF LEFT WRIST: ICD-10-CM

## 2020-10-05 PROCEDURE — 99999 PR PBB SHADOW E&M-EST. PATIENT-LVL III: CPT | Mod: PBBFAC,,, | Performed by: ORTHOPAEDIC SURGERY

## 2020-10-05 PROCEDURE — 99203 OFFICE O/P NEW LOW 30 MIN: CPT | Mod: S$GLB,,, | Performed by: ORTHOPAEDIC SURGERY

## 2020-10-05 PROCEDURE — 99999 PR PBB SHADOW E&M-EST. PATIENT-LVL III: ICD-10-PCS | Mod: PBBFAC,,, | Performed by: ORTHOPAEDIC SURGERY

## 2020-10-05 PROCEDURE — 99203 PR OFFICE/OUTPT VISIT, NEW, LEVL III, 30-44 MIN: ICD-10-PCS | Mod: S$GLB,,, | Performed by: ORTHOPAEDIC SURGERY

## 2020-10-05 RX ORDER — MELOXICAM 15 MG/1
15 TABLET ORAL DAILY
Qty: 30 TABLET | Refills: 0 | Status: SHIPPED | OUTPATIENT
Start: 2020-10-05 | End: 2021-01-14

## 2020-10-05 RX ORDER — MELOXICAM 15 MG/1
15 TABLET ORAL DAILY
COMMUNITY
End: 2020-10-05 | Stop reason: SDUPTHER

## 2020-10-05 NOTE — LETTER
October 6, 2020      Bronwyn Brandt MD  [address]  [facility] [address]           [facility]  [address] [place] [address]  Phone: [phone]          Patient: Chad Chen   MR Number: [id number]   YOB: 1980   Date of Visit: 10/5/2020       Dear Dr. Bronwyn Brandt:    Thank you for referring Chad Chen to me for evaluation. Attached you will find relevant portions of my assessment and plan of care.    If you have questions, please do not hesitate to call me. I look forward to following Chda Chen along with you.    Sincerely,    Dre Montanez MD    Enclosure  CC:  No Recipients    If you would like to receive this communication electronically, please contact [email or web address] or [phone] to request more information on [employer] Link access.    For providers and/or their staff who would like to refer a patient to [facility], please contact us through our one-stop-shop provider referral line, [facility], at [phone].    If you feel you have received this communication in error or would no longer like to receive these types of communications, please e-mail [email or web address]

## 2020-10-05 NOTE — PROGRESS NOTES
10/5/2020    Chief Complaint:  Chief Complaint   Patient presents with    Left Hand - Numbness, Pain       HPI:  Chad Chen is a 40 y.o. male, who presents to clinic today has a history of left hand numbness and tingling.  He states that this started approximately a month ago.  He did not have any injuries prior to the onset.  He states that he has started wearing the splint full time.  He is also started on Mobic.  He is here today for further evaluation and treatment options.    PMHX:  Past Medical History:   Diagnosis Date    Asthma     exercise induced    Cardiac murmur     Dyslipidemia     Hypertension     Obesity     Seasonal and perennial allergic rhinitis        PSHX:  Past Surgical History:   Procedure Laterality Date    ADENOIDECTOMY  2004    CHOLECYSTECTOMY  2010    laparoscopic removal gallstones    TONSILLECTOMY  2004    corrective septoplasty same time.       FMHX:  Family History   Problem Relation Age of Onset    Cancer Mother         triple breast cancer at 2 different times.     Heart disease Father         2V CABG at 45; 12 coronary stents.    Hyperlipidemia Father     Mental illness Father         MIKALA    Stroke Father         traumatic CVA    Vision loss Father         very poor vision    Diabetes Maternal Uncle     Hypertension Paternal Uncle     Cancer Maternal Grandfather          from melanoma at his 60's.    Early death Maternal Grandfather          early 60's of melanoma    Diabetes Paternal Grandmother     Hypertension Paternal Grandfather        SOCHX:  Social History     Tobacco Use    Smoking status: Never Smoker    Smokeless tobacco: Never Used   Substance Use Topics    Alcohol use: Yes     Alcohol/week: 4.0 standard drinks     Types: 4 Cans of beer per week     Frequency: 4 or more times a week     Drinks per session: 1 or 2     Binge frequency: Never       ALLERGIES:  Inderal [propranolol] and Lisinopril    CURRENT MEDICATIONS:  Current Outpatient  "Medications on File Prior to Visit   Medication Sig Dispense Refill    amLODIPine (NORVASC) 2.5 MG tablet Take 1 tablet (2.5 mg total) by mouth once daily. 30 tablet 1    meloxicam (MOBIC) 15 MG tablet Take 15 mg by mouth once daily.      om 3/E/linol/ala/oleic/gla/lip (OMEGA 3-6-9 ORAL) Take 1 capsule by mouth once daily.      potassium chloride (KLOR-CON) 10 MEQ TbSR Take 1 tablet (10 mEq total) by mouth 2 (two) times daily. 60 tablet 2    PROAIR RESPICLICK 90 mcg/actuation inhaler Inhale 2 puffs into the lungs every 6 (six) hours as needed for Wheezing or Shortness of Breath. 1 each 1    valsartan (DIOVAN) 320 MG tablet Take 1 tablet (320 mg total) by mouth once daily. 90 tablet 3     No current facility-administered medications on file prior to visit.        REVIEW OF SYSTEMS:  Review of Systems   Constitutional: Negative.    HENT: Positive for tinnitus. Negative for congestion, ear discharge, ear pain, hearing loss, nosebleeds, sinus pain and sore throat.    Eyes: Negative.    Respiratory: Negative.  Negative for stridor.    Cardiovascular: Negative.    Gastrointestinal: Negative.    Genitourinary: Negative.  Negative for hematuria.   Musculoskeletal: Positive for back pain. Negative for falls, joint pain, myalgias and neck pain.   Skin: Negative.    Neurological: Positive for tingling and weakness. Negative for dizziness, tremors, sensory change, speech change, focal weakness, seizures, loss of consciousness and headaches.   Endo/Heme/Allergies: Positive for environmental allergies. Negative for polydipsia. Does not bruise/bleed easily.   Psychiatric/Behavioral: Negative.        GENERAL PHYSICAL EXAM:   Ht 5' 11" (1.803 m)   Wt 129.1 kg (284 lb 9.8 oz)   BMI 39.70 kg/m²    GEN: well developed, well nourished, no acute distress   HENT: Normocephalic, atraumatic   EYES: No discharge, conjunctiva normal   NECK: Supple, non-tender   PULM: No wheezing, no respiratory distress   CV: RRR   ABD: Soft, " non-tender    ORTHO EXAM:   Examination of the left hand and wrist reveals that there is no edema.  There are no major skin changes.  Palpation produces mild global tenderness about the wrist and the hand.  Has a positive Tinel's but a negative Durkan's test.  He does report decreased sensation in the median distribution when compared to the ulnar and radial distribution.  He does have 2+ radial pulse.  Wrist range of motion is extension of 70° and flexion of 60°    RADIOLOGY:   None    ASSESSMENT:   Left carpal tunnel syndrome    PLAN:  1.  I will send the patient for EMG and nerve conduction study    2.  Will continue to take Mobic daily    3.  He will begin wearing the cock-up splint at night only.    4.  Will follow up with me as soon as the nerve conduction study is performed for discussion of treatment options which could include steroid injection versus consideration for carpal tunnel release      Answers for HPI/ROS submitted by the patient on 10/1/2020   Hand injury  unexpected weight change: No  appetite change : No  sleep disturbance: No  IMMUNOCOMPROMISED: No  dysphoric mood: Yes  visual disturbance: No  sinus pressure : No  food allergies: No  difficulty urinating: No  numbness: Yes  joint swelling: No  Pain Chronicity: new  History of trauma: No  Onset: 1 to 4 weeks ago  Frequency: constantly  Progression since onset: waxing and waning  injury location: at home  pain- numeric: 9/10  pain location: left wrist, left hand  pain quality: aching, numbing, shooting  Radiating Pain: Yes  If your pain is radiating, to what part of the body?: left arm, left hand  Aggravating factors: activity, bearing weight, eating, extension, flexion, twisting  inability to bear weight: No  joint locking: No  limited range of motion: No  stiffness: No  Treatments tried: brace/corset, cold, NSAIDs, rest  physical therapy: not tried  Improvement on treatment: mild

## 2020-10-15 ENCOUNTER — OFFICE VISIT (OUTPATIENT)
Dept: PHYSICAL MEDICINE AND REHAB | Facility: CLINIC | Age: 40
End: 2020-10-15
Payer: COMMERCIAL

## 2020-10-15 DIAGNOSIS — G56.02 CARPAL TUNNEL SYNDROME OF LEFT WRIST: ICD-10-CM

## 2020-10-15 PROCEDURE — 95885 PR MUSC TST DONE W/NERV TST LIM: ICD-10-PCS | Mod: S$GLB,,, | Performed by: PHYSICAL MEDICINE & REHABILITATION

## 2020-10-15 PROCEDURE — 99499 UNLISTED E&M SERVICE: CPT | Mod: S$GLB,,, | Performed by: PHYSICAL MEDICINE & REHABILITATION

## 2020-10-15 PROCEDURE — 99499 NO LOS: ICD-10-PCS | Mod: S$GLB,,, | Performed by: PHYSICAL MEDICINE & REHABILITATION

## 2020-10-15 PROCEDURE — 99999 PR PBB SHADOW E&M-EST. PATIENT-LVL II: ICD-10-PCS | Mod: PBBFAC,,, | Performed by: PHYSICAL MEDICINE & REHABILITATION

## 2020-10-15 PROCEDURE — 95885 MUSC TST DONE W/NERV TST LIM: CPT | Mod: S$GLB,,, | Performed by: PHYSICAL MEDICINE & REHABILITATION

## 2020-10-15 PROCEDURE — 99999 PR PBB SHADOW E&M-EST. PATIENT-LVL II: CPT | Mod: PBBFAC,,, | Performed by: PHYSICAL MEDICINE & REHABILITATION

## 2020-10-15 PROCEDURE — 95910 NRV CNDJ TEST 7-8 STUDIES: CPT | Mod: S$GLB,,, | Performed by: PHYSICAL MEDICINE & REHABILITATION

## 2020-10-15 PROCEDURE — 95910 PR NERVE CONDUCTION STUDY; 7-8 STUDIES: ICD-10-PCS | Mod: S$GLB,,, | Performed by: PHYSICAL MEDICINE & REHABILITATION

## 2020-10-15 NOTE — PROGRESS NOTES
[facility]  [address] [facility] [address]  [place] [address]             Full Name: Chad Chen Gender: Male  Patient ID: [id number] YOB: 1980  History & Physical Exam: He has been having left hand numbness and tingling with associated pain in the left wrist and his fingers. He feels numbness in the 4th digit on the left and pain in the left thumb. He denies neck pain with radicular symptoms in the LUE and neck. He denies h/o DM. He has a prior history of BL hand pain about 5-6 years ago, repeated sessions of spinal traction alleviated symptoms at that time.      Visit Date: 10/14/2020 12:10  Age: 40 Years 9 Months Old  Examining Physician: Dr. Ruvalcaba  Referring Physician: Dr. Montanez  Height: 5 feet 11 inch      Sensory NCS      Nerve / Sites Rec. Site Onset Lat Peak Lat NP Amp PP Amp Segments Distance Velocity     ms ms µV µV  cm m/s   R Median - Digit III (Antidromic)      Wrist Dig III 3.07 3.65 23.1 37.6 Wrist - Dig III 14 46   L Median - Digit III (Antidromic)      Wrist Dig III 2.76 3.59 26.2 37.8 Wrist - Dig III 14 51   R Ulnar - Digit V (Antidromic)      Wrist Dig V 2.34 3.07 20.2 28.7 Wrist - Dig V 14 60   L Ulnar - Digit V (Antidromic)      Wrist Dig V 2.55 3.18 17.8 20.6 Wrist - Dig V 14 55       Combined Sensory Index      Nerve / Sites Rec. Site Peak Lat NP Amp PP Amp Segments Peak Diff     ms µV µV  ms   L Median - CSI      Median palm Wrist 2.08 145.5 148.6        Ulnar palm Wrist 1.46 18.1 26.8        CSI     CSI 0.62       Motor NCS      Nerve / Sites Muscle Latency Amplitude Amp % Duration Segments Distance Lat Diff Velocity     ms mV % ms  cm ms m/s   L Median - APB      Wrist APB 3.54 9.0 100 6.61 Wrist - APB 8        Elbow APB 8.44 8.1 90 6.61 Elbow - Wrist 26 4.90 53   R Median - APB      Wrist APB 3.75 11.6 100 6.35 Wrist - APB 8        Elbow APB 8.59 11.0 94.8 6.61 Elbow - Wrist 26 4.84 54   L Ulnar - ADM      Wrist ADM 2.55 9.9 100 5.57 Wrist - ADM 8        B.Elbow ADM 6.35  8.4 84.7 5.89 B.Elbow - Wrist 22 3.80 58      A.Elbow ADM 8.18 9.7 97.4 6.09 A.Elbow - B.Elbow 10 1.82 55       EMG         EMG Summary Table     Spontaneous MUAP Recruitment   Muscle IA Fib PSW Fasc H.F. Amp Dur. PPP Pattern   L. Deltoid N None None None None N N N N   L. Triceps brachii N None None None None N N N N   L. Pronator teres N None None None None N N N N   L. First dorsal interosseous N None None None None N N N N       Summary    The motor conduction test was normal in all 3 of the tested nerves: L Median - APB, R Median - APB, L Ulnar - ADM.    The sensory conduction test was performed on 5 nerve(s). The results were normal in 4 nerve(s): L Median - Digit III (Antidromic), R Ulnar - Digit V (Antidromic), L Ulnar - Digit V (Antidromic). Findings were unremarkable in 1 nerve(s): L Median - CSI. Results outside the specified normal range were found in 1 nerve(s), as follows:   In the R Median - Digit III (Antidromic) study  o the peak latency result was increased for Wrist stimulation    The needle EMG study was normal in all 4 tested muscles: L. Deltoid, L. Triceps brachii, L. Pronator teres, L. First dorsal interosseous.      Electrodiagnostic Impression:  1. 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  Needle EMG examination of the median nerve innervated abductor pollicis brevis muscle on the left failed to show signs of active axonal denervation.  2. There was insufficient electrodiagnostic evidence for diagnoses of peripheral polyneuropathy, myopathy, or active axonal cervical radiculopathy/brachial plexopathy of the left upper extremity.    Summary:  Borderline mild bilateral carpal tunnel syndrome.    Plan:  Today's test results will be sent to his referring physician, Dr. Montanez, for further review and direction in his treatment.    Thank you very much for the referral. Please call if you have any questions regarding this study or the  report.       -------------------------------  David Osorio M.D.

## 2020-10-15 NOTE — LETTER
October 15, 2020      Dre Montanez MD  [address] [facility] [address] [place] [address]           [facility]  [address] [place] [address]  Phone: [phone]  Fax: [phone]          Patient: Chad Chen   MR Number: [id number]   YOB: 1980   Date of Visit: 10/15/2020       Dear Dr. Dre Montanez:    Thank you for referring Chad Chen to me for evaluation. Attached you will find relevant portions of my assessment and plan of care.    If you have questions, please do not hesitate to call me. I look forward to following Chad Chen along with you.    Sincerely,    David Osorio MD    Enclosure  CC:  No Recipients    If you would like to receive this communication electronically, please contact [email or web address] or [phone] to request more information on [employer] Link access.    For providers and/or their staff who would like to refer a patient to [facility], please contact us through our one-stop-shop provider referral line, [facility], at [phone].    If you feel you have received this communication in error or would no longer like to receive these types of communications, please e-mail [email or web address]

## 2020-10-27 ENCOUNTER — PATIENT OUTREACH (OUTPATIENT)
Dept: OTHER | Facility: OTHER | Age: 40
End: 2020-10-27

## 2020-10-27 DIAGNOSIS — I10 HYPERTENSION, UNSPECIFIED TYPE: Primary | ICD-10-CM

## 2020-10-27 RX ORDER — AMLODIPINE BESYLATE 5 MG/1
5 TABLET ORAL DAILY
Qty: 30 TABLET | Refills: 2 | Status: SHIPPED | OUTPATIENT
Start: 2020-10-27 | End: 2021-01-21 | Stop reason: SDUPTHER

## 2020-10-27 NOTE — PROGRESS NOTES
Digital Medicine: Clinician Follow-Up    Patient reports doing ok. Notes his carpal tunnel pain continues to get progressively worse. Appointment with the surgeon tomorrow.         The history is provided by the patient.      Review of patient's allergies indicates:   -- Inderal (propranolol) -- Hives   -- Lisinopril -- Other (See Comments)    --  cough  Follow-up reason(s): routine follow up.     Hypertension    Patient's blood pressure is stable.         Last 5 Patient Entered Readings                                      Current 30 Day Average: 135/87     Recent Readings 10/25/2020 10/25/2020 10/20/2020 10/20/2020 10/20/2020    SBP (mmHg) 137 138 138 128 140    DBP (mmHg) 85 85 86 84 92    Pulse 57 54 55 55 60                 Depression Screening  Did not address depression screening.    Sleep Apnea Screening    Did not address sleep apnea screening.     Medication Affordability Screening  Did not address medication affordability screening.     Medication Adherence-Medication adherence was assessed.          ASSESSMENT(S)  Patients BP average is 135/87 mmHg, which is above goal. Patient's BP goal is less than or equal to 130/80.     Patient agreeable to regimen titration. May not be necessary in the long term if pain improvement helps BP but needed at this time given DBP elevations.       Hypertension Plan  Hypertension Medication Change. INCREASE to amlodipine 5mg daily.   Continue current diet/physical activity routine.  Provided patient education. Discussed expectations with dose increase and possible ADRs.   Will continue to monitor and reach out in 2 weeks.      Addressed patient questions and patient has my contact information if needed prior to next outreach. Patient verbalizes understanding.             There are no preventive care reminders to display for this patient.  There are no preventive care reminders to display for this patient.      Hypertension Medications             amLODIPine (NORVASC) 2.5  MG tablet TAKE 1 TABLET BY MOUTH EVERY DAY    valsartan (DIOVAN) 320 MG tablet Take 1 tablet (320 mg total) by mouth once daily.

## 2020-10-28 ENCOUNTER — OFFICE VISIT (OUTPATIENT)
Dept: ORTHOPEDICS | Facility: CLINIC | Age: 40
End: 2020-10-28
Payer: COMMERCIAL

## 2020-10-28 VITALS
WEIGHT: 284 LBS | HEIGHT: 71 IN | SYSTOLIC BLOOD PRESSURE: 160 MMHG | HEART RATE: 77 BPM | DIASTOLIC BLOOD PRESSURE: 98 MMHG | BODY MASS INDEX: 39.76 KG/M2

## 2020-10-28 DIAGNOSIS — G56.02 LEFT CARPAL TUNNEL SYNDROME: Primary | ICD-10-CM

## 2020-10-28 PROCEDURE — 99213 PR OFFICE/OUTPT VISIT, EST, LEVL III, 20-29 MIN: ICD-10-PCS | Mod: S$GLB,,, | Performed by: ORTHOPAEDIC SURGERY

## 2020-10-28 PROCEDURE — 99213 OFFICE O/P EST LOW 20 MIN: CPT | Mod: S$GLB,,, | Performed by: ORTHOPAEDIC SURGERY

## 2020-10-28 PROCEDURE — 99999 PR PBB SHADOW E&M-EST. PATIENT-LVL III: ICD-10-PCS | Mod: PBBFAC,,, | Performed by: ORTHOPAEDIC SURGERY

## 2020-10-28 PROCEDURE — 99999 PR PBB SHADOW E&M-EST. PATIENT-LVL III: CPT | Mod: PBBFAC,,, | Performed by: ORTHOPAEDIC SURGERY

## 2020-10-30 NOTE — PROGRESS NOTES
Mr Chen returns to clinic today.  He has a history of carpal tunnel syndrome mainly on the left.  He was sent for nerve conduction study.  He is here today for follow-up.  He is still complaining of severe symptoms of numbness tingling and pain.    Physical exam:  Examination left hand and wrist reveals that there is no edema.  There are no skin changes.  Palpation produces tenderness overlying the entire hand but significantly over the carpal tunnel.  Has a positive Tinel's and a positive Durkan's test.  He has no masses or fullness noted.  He does report paresthesia in the median distribution with intact ulnar and radial sensation.    EMG and nerve conduction study:  Nerve conduction studies consistent with mild carpal tunnel syndrome    Assessment:  Left mild carpal tunnel syndrome    Plan:    1.  The patient symptoms are out of proportion for the nerve conduction exam results.  I do feel as if he has carpal tunnel symptoms which may be overly expressed.    2.  Based off of the nerve conduction study and my exam I would like to proceed with a carpal tunnel injection.  After informed consent was obtained injection was placed to the left carpal tunnel.  The patient tolerated that well.    3.  Will follow up with me in 6 weeks for an evaluation of the results of the injection.

## 2020-11-05 ENCOUNTER — PATIENT MESSAGE (OUTPATIENT)
Dept: ORTHOPEDICS | Facility: CLINIC | Age: 40
End: 2020-11-05

## 2020-11-07 ENCOUNTER — PATIENT MESSAGE (OUTPATIENT)
Dept: ADMINISTRATIVE | Facility: OTHER | Age: 40
End: 2020-11-07

## 2020-11-08 ENCOUNTER — PATIENT MESSAGE (OUTPATIENT)
Dept: FAMILY MEDICINE | Facility: CLINIC | Age: 40
End: 2020-11-08

## 2020-11-08 DIAGNOSIS — E87.6 HYPOKALEMIA: ICD-10-CM

## 2020-11-09 DIAGNOSIS — I10 HYPERTENSION, UNSPECIFIED TYPE: ICD-10-CM

## 2020-11-09 RX ORDER — AMLODIPINE BESYLATE 5 MG/1
5 TABLET ORAL DAILY
Qty: 30 TABLET | Refills: 0 | OUTPATIENT
Start: 2020-11-09 | End: 2021-11-09

## 2020-11-09 RX ORDER — POTASSIUM CHLORIDE 750 MG/1
10 TABLET, EXTENDED RELEASE ORAL 2 TIMES DAILY
Qty: 180 TABLET | Refills: 0 | Status: SHIPPED | OUTPATIENT
Start: 2020-11-09 | End: 2021-02-04 | Stop reason: SDUPTHER

## 2020-11-10 ENCOUNTER — PATIENT OUTREACH (OUTPATIENT)
Dept: OTHER | Facility: OTHER | Age: 40
End: 2020-11-10

## 2020-12-02 ENCOUNTER — OFFICE VISIT (OUTPATIENT)
Dept: ORTHOPEDICS | Facility: CLINIC | Age: 40
End: 2020-12-02
Payer: COMMERCIAL

## 2020-12-02 VITALS
WEIGHT: 284 LBS | HEART RATE: 75 BPM | HEIGHT: 71 IN | BODY MASS INDEX: 39.76 KG/M2 | DIASTOLIC BLOOD PRESSURE: 86 MMHG | SYSTOLIC BLOOD PRESSURE: 146 MMHG

## 2020-12-02 DIAGNOSIS — Z01.818 PREOPERATIVE TESTING: ICD-10-CM

## 2020-12-02 DIAGNOSIS — G56.02 LEFT CARPAL TUNNEL SYNDROME: Primary | ICD-10-CM

## 2020-12-02 DIAGNOSIS — Z01.818 PREOP TESTING: ICD-10-CM

## 2020-12-02 PROCEDURE — 99999 PR PBB SHADOW E&M-EST. PATIENT-LVL V: CPT | Mod: PBBFAC,,, | Performed by: ORTHOPAEDIC SURGERY

## 2020-12-02 PROCEDURE — 99213 OFFICE O/P EST LOW 20 MIN: CPT | Mod: S$GLB,,, | Performed by: ORTHOPAEDIC SURGERY

## 2020-12-02 PROCEDURE — 99999 PR PBB SHADOW E&M-EST. PATIENT-LVL V: ICD-10-PCS | Mod: PBBFAC,,, | Performed by: ORTHOPAEDIC SURGERY

## 2020-12-02 PROCEDURE — 99213 PR OFFICE/OUTPT VISIT, EST, LEVL III, 20-29 MIN: ICD-10-PCS | Mod: S$GLB,,, | Performed by: ORTHOPAEDIC SURGERY

## 2020-12-02 NOTE — H&P (VIEW-ONLY)
2020    Chief Complaint:  Chief Complaint   Patient presents with    Left Hand - Pain       HPI:  Chad Chen is a 40 y.o. male, who presents to clinic today has a history of left hand pain numbness and tingling.  He was noted to have carpal tunnel syndrome.  He has undergone conservative treatments without significant relief including anti-inflammatories, nighttime splinting, and steroid injections.  He is here today for discussion of further treatment options    PMHX:  Past Medical History:   Diagnosis Date    Asthma     exercise induced    Cardiac murmur     Dyslipidemia     Hypertension     Obesity     Seasonal and perennial allergic rhinitis        PSHX:  Past Surgical History:   Procedure Laterality Date    ADENOIDECTOMY  2004    CHOLECYSTECTOMY  2010    laparoscopic removal gallstones    TONSILLECTOMY  2004    corrective septoplasty same time.       FMHX:  Family History   Problem Relation Age of Onset    Cancer Mother         triple breast cancer at 2 different times.     Heart disease Father         2V CABG at 45; 12 coronary stents.    Hyperlipidemia Father     Mental illness Father         MIKALA    Stroke Father         traumatic CVA    Vision loss Father         very poor vision    Diabetes Maternal Uncle     Hypertension Paternal Uncle     Cancer Maternal Grandfather          from melanoma at his 60's.    Early death Maternal Grandfather          early 60's of melanoma    Diabetes Paternal Grandmother     Hypertension Paternal Grandfather        SOCHX:  Social History     Tobacco Use    Smoking status: Never Smoker    Smokeless tobacco: Never Used   Substance Use Topics    Alcohol use: Yes     Alcohol/week: 4.0 standard drinks     Types: 4 Cans of beer per week     Frequency: 4 or more times a week     Drinks per session: 1 or 2     Binge frequency: Never       ALLERGIES:  Inderal [propranolol] and Lisinopril    CURRENT MEDICATIONS:  Current Outpatient Medications on  "File Prior to Visit   Medication Sig Dispense Refill    amLODIPine (NORVASC) 5 MG tablet Take 1 tablet (5 mg total) by mouth once daily. 30 tablet 2    om 3/E/linol/ala/oleic/gla/lip (OMEGA 3-6-9 ORAL) Take 1 capsule by mouth once daily.      potassium chloride (KLOR-CON) 10 MEQ TbSR Take 1 tablet (10 mEq total) by mouth 2 (two) times daily. 180 tablet 0    valsartan (DIOVAN) 320 MG tablet Take 1 tablet (320 mg total) by mouth once daily. 90 tablet 3    meloxicam (MOBIC) 15 MG tablet Take 1 tablet (15 mg total) by mouth once daily. 30 tablet 0    PROAIR RESPICLICK 90 mcg/actuation inhaler Inhale 2 puffs into the lungs every 6 (six) hours as needed for Wheezing or Shortness of Breath. (Patient not taking: Reported on 12/2/2020) 1 each 1     No current facility-administered medications on file prior to visit.        REVIEW OF SYSTEMS:  ROS    GENERAL PHYSICAL EXAM:   BP (!) 146/86   Pulse 75   Ht 5' 11" (1.803 m)   Wt 128.8 kg (284 lb)   BMI 39.61 kg/m²    GEN: well developed, well nourished, no acute distress   HENT: Normocephalic, atraumatic   EYES: No discharge, conjunctiva normal   NECK: Supple, non-tender   PULM: No wheezing, no respiratory distress   CV: RRR   ABD: Soft, non-tender    ORTHO EXAM:   Examination of the left hand and wrist reveals that there is no edema.  There are no skin changes.  Palpation produces mild global tenderness.  He has a positive Tinel's and positive Durkan's test.  He does report decreased sensation in the median distribution.  He has 4/5 thenar muscular strength.  He has a 2+ radial pulse.    RADIOLOGY:   EMG nerve conduction study has been reviewed.  There is noted be mild conduction delay across the wrist at the median nerve    ASSESSMENT:   Left carpal tunnel syndrome    PLAN:  1.  I had a lengthy discussion with the patient about the risks and benefits of carpal tunnel release.  I did discuss chronic pain issues with the patient as well.  I discussed the risk of having " limited improvement from the carpal tunnel release as well as all the other risks including infection injury to the nerve and poor wound healing.  The patient has provided informed consent    2.  Will proceed with left carpal tunnel release under general anesthesia    3.  Will follow up with me 2 weeks postoperatively

## 2020-12-02 NOTE — PATIENT INSTRUCTIONS
Surgery Instructions:     Your surgery is scheduled on 12/17/2020 at the surgery center: [address], 1st floor, second entrance.    The pre-op department will be in contact with you prior to your procedure to review medications and instructions.       Nothing to eat or drink after midnight prior to day of surgery.    You should STOP taking any blood thinners 7 days prior to surgery.       Your pre op COVID-19 test collection is scheduled for 12/14/2020 at 9:20 AM.  Please arrive at the drive thru at entrance 3 for this test collection.  You will not need to get out of your car.    The surgery center will contact you the day prior to surgery to advise you of your arrival time for surgery.     Your post op appointment is scheduled on 01/04/2021 at 8:40 AM.    You will be contacted by an automated text message every morning for for 14 days after your surgery inquiring if you have any COVID symptoms.  If you have any concerns regarding COVID please reply to the text message and then an [facility] On Call Registered Nurse will contact you later that day.

## 2020-12-07 PROBLEM — G56.02 LEFT CARPAL TUNNEL SYNDROME: Status: ACTIVE | Noted: 2020-12-07

## 2020-12-07 RX ORDER — LIDOCAINE HYDROCHLORIDE 10 MG/ML
1 INJECTION, SOLUTION EPIDURAL; INFILTRATION; INTRACAUDAL; PERINEURAL ONCE
Status: CANCELLED | OUTPATIENT
Start: 2020-12-07 | End: 2020-12-07

## 2020-12-07 RX ORDER — MUPIROCIN 20 MG/G
OINTMENT TOPICAL
Status: CANCELLED | OUTPATIENT
Start: 2020-12-07

## 2020-12-11 ENCOUNTER — TELEPHONE (OUTPATIENT)
Dept: ORTHOPEDICS | Facility: CLINIC | Age: 40
End: 2020-12-11

## 2020-12-11 NOTE — TELEPHONE ENCOUNTER
Left message on voicemail for Samy to call back. Also left fax number on voicemail if they need to send us paperwork to fill out. Pt having surgery next week with Dr. Montanez. Thanks, Aimee

## 2020-12-11 NOTE — TELEPHONE ENCOUNTER
----- Message from Lyubov Jean-Baptiste sent at 12/11/2020  2:58 PM CST -----  Regarding: Advice  Contact: katrin with [employer]  Type: Needs Medical Advice    Who Called:  katrin with [employer]  Symptoms (please be specific):  n/a  How long has patient had these symptoms:  n/a  Pharmacy name and phone #:  n/a  Best Call Back Number: [phone] ext [phone]  Additional Information: please call

## 2020-12-14 ENCOUNTER — LAB VISIT (OUTPATIENT)
Dept: FAMILY MEDICINE | Facility: CLINIC | Age: 40
End: 2020-12-14
Payer: COMMERCIAL

## 2020-12-14 ENCOUNTER — PATIENT MESSAGE (OUTPATIENT)
Dept: FAMILY MEDICINE | Facility: CLINIC | Age: 40
End: 2020-12-14

## 2020-12-14 DIAGNOSIS — Z01.818 PREOPERATIVE TESTING: ICD-10-CM

## 2020-12-14 DIAGNOSIS — Z00.00 PREVENTATIVE HEALTH CARE: Primary | ICD-10-CM

## 2020-12-14 PROCEDURE — U0003 INFECTIOUS AGENT DETECTION BY NUCLEIC ACID (DNA OR RNA); SEVERE ACUTE RESPIRATORY SYNDROME CORONAVIRUS 2 (SARS-COV-2) (CORONAVIRUS DISEASE [COVID-19]), AMPLIFIED PROBE TECHNIQUE, MAKING USE OF HIGH THROUGHPUT TECHNOLOGIES AS DESCRIBED BY CMS-2020-01-R: HCPCS

## 2020-12-14 NOTE — TELEPHONE ENCOUNTER
Please advise of any additional labs pt needs prior to upcoming appointment.   If nothing more needed will schedule existing orders

## 2020-12-15 LAB — SARS-COV-2 RNA RESP QL NAA+PROBE: NOT DETECTED

## 2020-12-16 ENCOUNTER — ANESTHESIA EVENT (OUTPATIENT)
Dept: SURGERY | Facility: HOSPITAL | Age: 40
End: 2020-12-16
Payer: COMMERCIAL

## 2020-12-16 ENCOUNTER — PATIENT MESSAGE (OUTPATIENT)
Dept: SURGERY | Facility: HOSPITAL | Age: 40
End: 2020-12-16

## 2020-12-17 ENCOUNTER — HOSPITAL ENCOUNTER (OUTPATIENT)
Facility: HOSPITAL | Age: 40
Discharge: HOME | End: 2020-12-17
Attending: ORTHOPAEDIC SURGERY | Admitting: ORTHOPAEDIC SURGERY
Payer: COMMERCIAL

## 2020-12-17 ENCOUNTER — ANESTHESIA (OUTPATIENT)
Dept: SURGERY | Facility: HOSPITAL | Age: 40
End: 2020-12-17
Payer: COMMERCIAL

## 2020-12-17 DIAGNOSIS — G56.02 LEFT CARPAL TUNNEL SYNDROME: ICD-10-CM

## 2020-12-17 PROCEDURE — 25000003 PHARM REV CODE 250: Mod: PO | Performed by: ANESTHESIOLOGY

## 2020-12-17 PROCEDURE — 63600175 PHARM REV CODE 636 W HCPCS: Mod: PO | Performed by: NURSE ANESTHETIST, CERTIFIED REGISTERED

## 2020-12-17 PROCEDURE — 63600175 PHARM REV CODE 636 W HCPCS: Mod: PO | Performed by: ANESTHESIOLOGY

## 2020-12-17 PROCEDURE — D9220A PRA ANESTHESIA: ICD-10-PCS | Mod: ANES,,, | Performed by: ANESTHESIOLOGY

## 2020-12-17 PROCEDURE — 25000003 PHARM REV CODE 250: Mod: PO | Performed by: NURSE ANESTHETIST, CERTIFIED REGISTERED

## 2020-12-17 PROCEDURE — 36000706: Mod: PO | Performed by: ORTHOPAEDIC SURGERY

## 2020-12-17 PROCEDURE — 71000015 HC POSTOP RECOV 1ST HR: Mod: PO | Performed by: ORTHOPAEDIC SURGERY

## 2020-12-17 PROCEDURE — 37000008 HC ANESTHESIA 1ST 15 MINUTES: Mod: PO | Performed by: ORTHOPAEDIC SURGERY

## 2020-12-17 PROCEDURE — 36000707: Mod: PO | Performed by: ORTHOPAEDIC SURGERY

## 2020-12-17 PROCEDURE — 63600175 PHARM REV CODE 636 W HCPCS: Mod: PO | Performed by: ORTHOPAEDIC SURGERY

## 2020-12-17 PROCEDURE — 25000003 PHARM REV CODE 250: Mod: PO | Performed by: ORTHOPAEDIC SURGERY

## 2020-12-17 PROCEDURE — D9220A PRA ANESTHESIA: Mod: ANES,,, | Performed by: ANESTHESIOLOGY

## 2020-12-17 PROCEDURE — 37000009 HC ANESTHESIA EA ADD 15 MINS: Mod: PO | Performed by: ORTHOPAEDIC SURGERY

## 2020-12-17 PROCEDURE — 71000033 HC RECOVERY, INTIAL HOUR: Mod: PO | Performed by: ORTHOPAEDIC SURGERY

## 2020-12-17 PROCEDURE — 64721 CARPAL TUNNEL SURGERY: CPT | Mod: LT,,, | Performed by: ORTHOPAEDIC SURGERY

## 2020-12-17 PROCEDURE — D9220A PRA ANESTHESIA: Mod: CRNA,,, | Performed by: NURSE ANESTHETIST, CERTIFIED REGISTERED

## 2020-12-17 PROCEDURE — D9220A PRA ANESTHESIA: ICD-10-PCS | Mod: CRNA,,, | Performed by: NURSE ANESTHETIST, CERTIFIED REGISTERED

## 2020-12-17 PROCEDURE — 64721 PR REVISE MEDIAN N/CARPAL TUNNEL SURG: ICD-10-PCS | Mod: LT,,, | Performed by: ORTHOPAEDIC SURGERY

## 2020-12-17 RX ORDER — MUPIROCIN 20 MG/G
OINTMENT TOPICAL
Status: DISCONTINUED | OUTPATIENT
Start: 2020-12-17 | End: 2020-12-17 | Stop reason: HOSPADM

## 2020-12-17 RX ORDER — LIDOCAINE HYDROCHLORIDE 10 MG/ML
1 INJECTION, SOLUTION EPIDURAL; INFILTRATION; INTRACAUDAL; PERINEURAL ONCE
Status: COMPLETED | OUTPATIENT
Start: 2020-12-17 | End: 2020-12-17

## 2020-12-17 RX ORDER — HYDROMORPHONE HYDROCHLORIDE 2 MG/ML
0.2 INJECTION, SOLUTION INTRAMUSCULAR; INTRAVENOUS; SUBCUTANEOUS EVERY 5 MIN PRN
Status: DISCONTINUED | OUTPATIENT
Start: 2020-12-17 | End: 2020-12-17 | Stop reason: HOSPADM

## 2020-12-17 RX ORDER — DIPHENHYDRAMINE HYDROCHLORIDE 50 MG/ML
25 INJECTION INTRAMUSCULAR; INTRAVENOUS EVERY 6 HOURS PRN
Status: DISCONTINUED | OUTPATIENT
Start: 2020-12-17 | End: 2020-12-17 | Stop reason: HOSPADM

## 2020-12-17 RX ORDER — OXYCODONE HYDROCHLORIDE 5 MG/1
5 TABLET ORAL
Status: DISCONTINUED | OUTPATIENT
Start: 2020-12-17 | End: 2020-12-17 | Stop reason: HOSPADM

## 2020-12-17 RX ORDER — LIDOCAINE HCL/PF 100 MG/5ML
SYRINGE (ML) INTRAVENOUS
Status: DISCONTINUED | OUTPATIENT
Start: 2020-12-17 | End: 2020-12-17

## 2020-12-17 RX ORDER — ONDANSETRON 2 MG/ML
INJECTION INTRAMUSCULAR; INTRAVENOUS
Status: DISCONTINUED | OUTPATIENT
Start: 2020-12-17 | End: 2020-12-17

## 2020-12-17 RX ORDER — ONDANSETRON 8 MG/1
8 TABLET, ORALLY DISINTEGRATING ORAL EVERY 8 HOURS PRN
Qty: 4 TABLET | Refills: 0 | Status: SHIPPED | OUTPATIENT
Start: 2020-12-17 | End: 2021-01-14

## 2020-12-17 RX ORDER — PROPOFOL 10 MG/ML
INJECTION, EMULSION INTRAVENOUS
Status: DISCONTINUED | OUTPATIENT
Start: 2020-12-17 | End: 2020-12-17

## 2020-12-17 RX ORDER — LIDOCAINE HYDROCHLORIDE 10 MG/ML
INJECTION, SOLUTION EPIDURAL; INFILTRATION; INTRACAUDAL; PERINEURAL
Status: DISCONTINUED | OUTPATIENT
Start: 2020-12-17 | End: 2020-12-17 | Stop reason: HOSPADM

## 2020-12-17 RX ORDER — DEXAMETHASONE SODIUM PHOSPHATE 4 MG/ML
INJECTION, SOLUTION INTRA-ARTICULAR; INTRALESIONAL; INTRAMUSCULAR; INTRAVENOUS; SOFT TISSUE
Status: DISCONTINUED | OUTPATIENT
Start: 2020-12-17 | End: 2020-12-17

## 2020-12-17 RX ORDER — MEPERIDINE HYDROCHLORIDE 50 MG/ML
12.5 INJECTION INTRAMUSCULAR; INTRAVENOUS; SUBCUTANEOUS ONCE AS NEEDED
Status: DISCONTINUED | OUTPATIENT
Start: 2020-12-17 | End: 2020-12-17 | Stop reason: HOSPADM

## 2020-12-17 RX ORDER — BUPIVACAINE HYDROCHLORIDE 2.5 MG/ML
INJECTION, SOLUTION EPIDURAL; INFILTRATION; INTRACAUDAL
Status: DISCONTINUED | OUTPATIENT
Start: 2020-12-17 | End: 2020-12-17 | Stop reason: HOSPADM

## 2020-12-17 RX ORDER — FENTANYL CITRATE 50 UG/ML
INJECTION, SOLUTION INTRAMUSCULAR; INTRAVENOUS
Status: DISCONTINUED | OUTPATIENT
Start: 2020-12-17 | End: 2020-12-17

## 2020-12-17 RX ORDER — SODIUM CHLORIDE 0.9 % (FLUSH) 0.9 %
3 SYRINGE (ML) INJECTION
Status: DISCONTINUED | OUTPATIENT
Start: 2020-12-17 | End: 2020-12-17 | Stop reason: HOSPADM

## 2020-12-17 RX ORDER — CEFAZOLIN SODIUM 2 G/50ML
2 SOLUTION INTRAVENOUS
Status: COMPLETED | OUTPATIENT
Start: 2020-12-17 | End: 2020-12-17

## 2020-12-17 RX ORDER — ACETAMINOPHEN 10 MG/ML
INJECTION, SOLUTION INTRAVENOUS
Status: DISCONTINUED | OUTPATIENT
Start: 2020-12-17 | End: 2020-12-17

## 2020-12-17 RX ORDER — HYDROCODONE BITARTRATE AND ACETAMINOPHEN 5; 325 MG/1; MG/1
1 TABLET ORAL EVERY 6 HOURS PRN
Qty: 8 TABLET | Refills: 0 | Status: SHIPPED | OUTPATIENT
Start: 2020-12-17 | End: 2021-01-14

## 2020-12-17 RX ORDER — ONDANSETRON 8 MG/1
8 TABLET, ORALLY DISINTEGRATING ORAL ONCE
Status: COMPLETED | OUTPATIENT
Start: 2020-12-17 | End: 2020-12-17

## 2020-12-17 RX ORDER — LIDOCAINE HYDROCHLORIDE 10 MG/ML
1 INJECTION, SOLUTION EPIDURAL; INFILTRATION; INTRACAUDAL; PERINEURAL ONCE
Status: DISCONTINUED | OUTPATIENT
Start: 2020-12-17 | End: 2020-12-17 | Stop reason: HOSPADM

## 2020-12-17 RX ORDER — FENTANYL CITRATE 50 UG/ML
25 INJECTION, SOLUTION INTRAMUSCULAR; INTRAVENOUS EVERY 5 MIN PRN
Status: COMPLETED | OUTPATIENT
Start: 2020-12-17 | End: 2020-12-17

## 2020-12-17 RX ORDER — SODIUM CHLORIDE, SODIUM LACTATE, POTASSIUM CHLORIDE, CALCIUM CHLORIDE 600; 310; 30; 20 MG/100ML; MG/100ML; MG/100ML; MG/100ML
INJECTION, SOLUTION INTRAVENOUS CONTINUOUS
Status: DISCONTINUED | OUTPATIENT
Start: 2020-12-17 | End: 2020-12-17 | Stop reason: HOSPADM

## 2020-12-17 RX ORDER — MIDAZOLAM HYDROCHLORIDE 1 MG/ML
INJECTION INTRAMUSCULAR; INTRAVENOUS
Status: DISCONTINUED | OUTPATIENT
Start: 2020-12-17 | End: 2020-12-17

## 2020-12-17 RX ADMIN — LIDOCAINE HYDROCHLORIDE 75 MG: 20 INJECTION PARENTERAL at 11:12

## 2020-12-17 RX ADMIN — FENTANYL CITRATE 50 MCG: 50 INJECTION, SOLUTION INTRAMUSCULAR; INTRAVENOUS at 11:12

## 2020-12-17 RX ADMIN — ONDANSETRON 4 MG: 2 INJECTION, SOLUTION INTRAMUSCULAR; INTRAVENOUS at 11:12

## 2020-12-17 RX ADMIN — ACETAMINOPHEN 1000 MG: 10 INJECTION, SOLUTION INTRAVENOUS at 11:12

## 2020-12-17 RX ADMIN — FENTANYL CITRATE 25 MCG: 50 INJECTION, SOLUTION INTRAMUSCULAR; INTRAVENOUS at 12:12

## 2020-12-17 RX ADMIN — OXYCODONE 5 MG: 5 TABLET ORAL at 12:12

## 2020-12-17 RX ADMIN — SODIUM CHLORIDE, SODIUM LACTATE, POTASSIUM CHLORIDE, AND CALCIUM CHLORIDE: .6; .31; .03; .02 INJECTION, SOLUTION INTRAVENOUS at 10:12

## 2020-12-17 RX ADMIN — LIDOCAINE HYDROCHLORIDE 10 MG: 10 INJECTION, SOLUTION EPIDURAL; INFILTRATION; INTRACAUDAL; PERINEURAL at 10:12

## 2020-12-17 RX ADMIN — ONDANSETRON 8 MG: 8 TABLET, ORALLY DISINTEGRATING ORAL at 01:12

## 2020-12-17 RX ADMIN — CEFAZOLIN SODIUM 2 G: 2 SOLUTION INTRAVENOUS at 11:12

## 2020-12-17 RX ADMIN — MIDAZOLAM HYDROCHLORIDE 2 MG: 1 INJECTION, SOLUTION INTRAMUSCULAR; INTRAVENOUS at 11:12

## 2020-12-17 RX ADMIN — PROPOFOL 200 MG: 10 INJECTION, EMULSION INTRAVENOUS at 11:12

## 2020-12-17 RX ADMIN — DEXAMETHASONE SODIUM PHOSPHATE 8 MG: 4 INJECTION, SOLUTION INTRAMUSCULAR; INTRAVENOUS at 11:12

## 2020-12-17 RX ADMIN — MUPIROCIN: 20 OINTMENT TOPICAL at 10:12

## 2020-12-17 NOTE — DISCHARGE INSTRUCTIONS
Procedure: left carpal tunnel release    1. Please keep the dressing clean, dry, and in place. Do not take it off and do not get it wet.    2. Please keep the left arm and hand elevated for the 1st 24-48 hours to prevent swelling    3. Flexion and extension of the exposed fingers is encouraged, but do not attempt to push off or lift more than 1-2 pounds with left arm or hand    4. Please limit weightbearing to the left hand to 1-2 pounds. Light activity such as brushing your teeth, using a fork, or lifting a small drink is allowed starting today.    5. Pain medication and nausea medication have been prescribed. Please take them as necessary    6. If there are any questions or concerns please call Dr. Montanez's office at [phone]    7.  Follow up with Dr. Montanez in 2 weeks

## 2020-12-17 NOTE — ANESTHESIA PREPROCEDURE EVALUATION
12/17/2020  Chad Chen is a 40 y.o., male.    Anesthesia Evaluation    I have reviewed the Patient Summary Reports.    I have reviewed the Nursing Notes.    I have reviewed the Medications.     Review of Systems  Anesthesia Hx:  Hx of Anesthetic complications (PONV)    Social:  Non-Smoker    Cardiovascular:   Hypertension, well controlled Valvular problems/Murmurs (murmur)    Pulmonary:   Asthma asymptomatic and mild    Renal/:  Renal/ Normal     Neurological:   Neuromuscular Disease,    Endocrine:  Endocrine Normal        Physical Exam  General:  Well nourished, Obesity    Airway/Jaw/Neck:  Airway Findings: Mouth Opening: Normal Tongue: Normal  General Airway Assessment: Adult  Oropharynx Findings:  Mallampati: II  Jaw/Neck Findings:  Neck ROM: Normal ROM     Eyes/Ears/Nose:  Eyes/Ears/Nose Findings:    Dental:  Dental Findings:   Chest/Lungs:  Chest/Lungs Findings: Normal Respiratory Rate     Heart/Vascular:  Heart Findings: Rate: Normal  Rhythm: Regular Rhythm        Mental Status:  Mental Status Findings:  Cooperative, Alert and Oriented         Anesthesia Plan  Type of Anesthesia, risks & benefits discussed:  Anesthesia Type:  general  Patient's Preference:   Intra-op Monitoring Plan: standard ASA monitors  Intra-op Monitoring Plan Comments:   Post Op Pain Control Plan: multimodal analgesia  Post Op Pain Control Plan Comments:   Induction:   IV  Beta Blocker:  Patient is not currently on a Beta-Blocker (No further documentation required).       Informed Consent: Patient understands risks and agrees with Anesthesia plan.  Questions answered. Anesthesia consent signed with patient.  ASA Score: 2     Day of Surgery Review of History & Physical:            Ready For Surgery From Anesthesia Perspective.

## 2020-12-17 NOTE — OP NOTE
Chad Chen  1980    DATE OF SURGERY: 12/17/2020     PRE-OPERATIVE DIAGNOSIS: left Carpal Tunnel Syndrome    POST-OPERATIVE DIAGNOSIS: left Carpal Tunnel Syndrome     ANESTHESIA TYPE:  General    BLOOD LOSS:  Less than 10 cc    TOURNIQUET TIME:  10 min    SURGEON: Dr. Montanez    ASSISTANT: Bhavin Barnhart    PROCEDURE: left Carpal Tunnel Release    IMPLANTS: None    SPECIMENS: None    INDICATION:     Mr. Chen presented to my clinic with a history of left carpal tunnel symptoms. Conservative treatments were initially tried. The patient did have relief with attempts at conservative treatment however has had a return of symptoms. An EMG and nerve conduction study has been performed. That study has shown compression of the median nerve at the wrist consistent with carpal tunnel syndrome. A discussion of the risks and benefits of carpal tunnel release has been performed, and informed consent for the procedure has been obtained.    PROCEDURE IN DETAIL:     Mr. Chen was transported to the operating room and was placed supine on the operating room table. All appropriate points were padded. The left hand and arm was prepped and draped in the normal sterile fashion. Time out was called. The correct patient, correct operative site, correct procedure, antibiotic administration which consisted of 2 g of Ancef, and allergies to medications which are to Inderal [propranolol] and Lisinopril  were reviewed. Time in was then called.     Attention was turned to left hand where a 3 cm incision was made in the palm of the hand. This was carried through the skin and subcutaneous tissues were dissected bluntly. The superficial palmar fascia was identified and was split in line with its fibers. The transverse carpal ligament was then identified and was incised for a distance of approximately 1 cm sharply.The median nerve was visualized and a carpal tunnel sled was placed below the transverse carpal ligament but above the median  nerve. Blunt dissection proximally over the transverse carpal ligament was performed and elevator was placed just above the transverse carpal ligament proximally. The ligament was identified. There were noted to be no penetrating nerve branches and at that point the carpal tunnel knife was used to incise the proximal transverse carpal ligament. Attention was then turned to the distal portion of the transverse carpal ligament. The carpal tunnel sled was again placed underneath the transverse carpal ligament but above the median nerve distally. Blunt dissection above the transverse carpal ligament distally was performed and an elevator was placed. The distal portion of the transverse carpal ligament was visualized and there were noted to be no penetrating branches of the nerve. At that point, tenotomy scissors were used to transect the distal portion of the transverse carpal ligament under direct visualization. The median nerve was then visualized. There were noted to be no specific lesions of the nerve and all of its branches were noted to be intact. The wound was then irrigated copiously. The tourniquet was let down and meticulous hemostasis was obtained with bipolar cautery. The wound was closed with 4-0 nylon suture superficially. The wound was then dressed with Xeroform, 4 x 4's, cast padding and a 3 inch Ace wrap was placed.      The patient was stable in the operating room and was transported to the recovery room in stable condition. All lap, needle, sponge, and equipment counts were correct at the end of the case.    POST-OPERATIVE PLAN:     The patient will keep a soft dressing in place for two weeks at which time he will followup with me. The dressing will be taken down, and the sutures will be removed at that time. He is not to get the dressing wet or to take it off. He will have a 2 pound weight limit of the left upper extremity for a total of 4 weeks.

## 2020-12-18 VITALS
TEMPERATURE: 97 F | DIASTOLIC BLOOD PRESSURE: 83 MMHG | BODY MASS INDEX: 37.93 KG/M2 | RESPIRATION RATE: 18 BRPM | HEART RATE: 70 BPM | WEIGHT: 280 LBS | OXYGEN SATURATION: 96 % | SYSTOLIC BLOOD PRESSURE: 137 MMHG | HEIGHT: 72 IN

## 2020-12-18 NOTE — ANESTHESIA POSTPROCEDURE EVALUATION
Anesthesia Post Evaluation    Patient: Chad Chen    Procedure(s) Performed: Procedure(s) (LRB):  RELEASE, CARPAL TUNNEL (Left)    Final Anesthesia Type: general      Patient location during evaluation: PACU  Patient participation: Yes- Able to Participate  Level of consciousness: awake and alert and oriented  Post-procedure vital signs: reviewed and stable  Pain management: adequate  Airway patency: patent    PONV status at discharge: No PONV  Anesthetic complications: no      Cardiovascular status: blood pressure returned to baseline and stable  Respiratory status: unassisted and spontaneous ventilation  Hydration status: euvolemic  Follow-up not needed.          Vitals Value Taken Time   /83 12/17/20 1245   Temp 36.2 °C (97.2 °F) 12/17/20 1245   Pulse 70 12/17/20 1245   Resp 18 12/17/20 1250   SpO2 96 % 12/17/20 1245         Event Time   Out of Recovery 13:10:00         Pain/Shellie Score: Pain Rating Prior to Med Admin: 4 (12/17/2020 12:55 PM)  Shellie Score: 10 (12/17/2020 12:45 PM)

## 2020-12-29 ENCOUNTER — PATIENT OUTREACH (OUTPATIENT)
Dept: OTHER | Facility: OTHER | Age: 40
End: 2020-12-29

## 2020-12-29 NOTE — PROGRESS NOTES
Digital Medicine: Health  Follow-Up    The history is provided by the patient.             Reason for review: Blood pressure at goal        Topics Covered on Call: physical activity and Diet            Diet-no change to diet    No change to diet.        Physical Activity-no change to routine  No change to exercise routine.     Medication Adherence-Medication adherence was assessed.        Substance, Sleep, Stress-change  stress-  Details:  Intervention(s):    Sleep-assessed  Details:  Intervention(s): sleep hygiene tips    Alcohol -  Details:  Intervention(s):    Tobacco-  Details:  Intervention(s):          Continue current diet/physical activity routine.       Addressed patient questions and patient has my contact information if needed prior to next outreach.   Explained the importance of self-monitoring and medication adherence. Encouraged the patient to communicate with their health  for lifestyle modifications to help improve or maintain a healthy lifestyle.               There are no preventive care reminders to display for this patient.      Last 5 Patient Entered Readings                                      Current 30 Day Average: 124/82     Recent Readings 12/26/2020 12/23/2020 12/23/2020 12/20/2020 12/20/2020    SBP (mmHg) 116 129 132 120 129    DBP (mmHg) 78 92 85 75 81    Pulse 58 69 65 50 50

## 2021-01-03 ENCOUNTER — PATIENT MESSAGE (OUTPATIENT)
Dept: FAMILY MEDICINE | Facility: CLINIC | Age: 41
End: 2021-01-03

## 2021-01-04 ENCOUNTER — OFFICE VISIT (OUTPATIENT)
Dept: ORTHOPEDICS | Facility: CLINIC | Age: 41
End: 2021-01-04
Payer: COMMERCIAL

## 2021-01-04 ENCOUNTER — PATIENT MESSAGE (OUTPATIENT)
Dept: ORTHOPEDICS | Facility: CLINIC | Age: 41
End: 2021-01-04

## 2021-01-04 VITALS
BODY MASS INDEX: 39.2 KG/M2 | DIASTOLIC BLOOD PRESSURE: 98 MMHG | HEIGHT: 71 IN | HEART RATE: 83 BPM | SYSTOLIC BLOOD PRESSURE: 160 MMHG | WEIGHT: 280 LBS

## 2021-01-04 DIAGNOSIS — Z98.890 STATUS POST CARPAL TUNNEL RELEASE: Primary | ICD-10-CM

## 2021-01-04 PROCEDURE — 99024 PR POST-OP FOLLOW-UP VISIT: ICD-10-PCS | Mod: S$GLB,,, | Performed by: ORTHOPAEDIC SURGERY

## 2021-01-04 PROCEDURE — 99999 PR PBB SHADOW E&M-EST. PATIENT-LVL III: ICD-10-PCS | Mod: PBBFAC,,, | Performed by: ORTHOPAEDIC SURGERY

## 2021-01-04 PROCEDURE — 99024 POSTOP FOLLOW-UP VISIT: CPT | Mod: S$GLB,,, | Performed by: ORTHOPAEDIC SURGERY

## 2021-01-04 PROCEDURE — 99999 PR PBB SHADOW E&M-EST. PATIENT-LVL III: CPT | Mod: PBBFAC,,, | Performed by: ORTHOPAEDIC SURGERY

## 2021-01-08 ENCOUNTER — LAB VISIT (OUTPATIENT)
Dept: LAB | Facility: HOSPITAL | Age: 41
End: 2021-01-08
Attending: INTERNAL MEDICINE
Payer: COMMERCIAL

## 2021-01-08 DIAGNOSIS — Z00.00 PREVENTATIVE HEALTH CARE: ICD-10-CM

## 2021-01-08 LAB
25(OH)D3+25(OH)D2 SERPL-MCNC: 20 NG/ML (ref 30–96)
ALBUMIN SERPL BCP-MCNC: 3.7 G/DL (ref 3.5–5.2)
ALP SERPL-CCNC: 92 U/L (ref 55–135)
ALT SERPL W/O P-5'-P-CCNC: 44 U/L (ref 10–44)
ANION GAP SERPL CALC-SCNC: 11 MMOL/L (ref 8–16)
AST SERPL-CCNC: 21 U/L (ref 10–40)
BASOPHILS # BLD AUTO: 0.03 K/UL (ref 0–0.2)
BASOPHILS NFR BLD: 0.4 % (ref 0–1.9)
BILIRUB SERPL-MCNC: 0.5 MG/DL (ref 0.1–1)
BUN SERPL-MCNC: 17 MG/DL (ref 6–20)
CALCIUM SERPL-MCNC: 8.9 MG/DL (ref 8.7–10.5)
CHLORIDE SERPL-SCNC: 105 MMOL/L (ref 95–110)
CHOLEST SERPL-MCNC: 214 MG/DL (ref 120–199)
CHOLEST/HDLC SERPL: 6.1 {RATIO} (ref 2–5)
CO2 SERPL-SCNC: 24 MMOL/L (ref 23–29)
CREAT SERPL-MCNC: 0.9 MG/DL (ref 0.5–1.4)
DIFFERENTIAL METHOD: ABNORMAL
EOSINOPHIL # BLD AUTO: 0.1 K/UL (ref 0–0.5)
EOSINOPHIL NFR BLD: 1.8 % (ref 0–8)
ERYTHROCYTE [DISTWIDTH] IN BLOOD BY AUTOMATED COUNT: 14.3 % (ref 11.5–14.5)
EST. GFR  (AFRICAN AMERICAN): >60 ML/MIN/1.73 M^2
EST. GFR  (NON AFRICAN AMERICAN): >60 ML/MIN/1.73 M^2
ESTIMATED AVG GLUCOSE: 114 MG/DL (ref 68–131)
GLUCOSE SERPL-MCNC: 91 MG/DL (ref 70–110)
HBA1C MFR BLD HPLC: 5.6 % (ref 4–5.6)
HCT VFR BLD AUTO: 45 % (ref 40–54)
HDLC SERPL-MCNC: 35 MG/DL (ref 40–75)
HDLC SERPL: 16.4 % (ref 20–50)
HGB BLD-MCNC: 14.1 G/DL (ref 14–18)
IMM GRANULOCYTES # BLD AUTO: 0.01 K/UL (ref 0–0.04)
IMM GRANULOCYTES NFR BLD AUTO: 0.1 % (ref 0–0.5)
LDLC SERPL CALC-MCNC: 143 MG/DL (ref 63–159)
LYMPHOCYTES # BLD AUTO: 2.3 K/UL (ref 1–4.8)
LYMPHOCYTES NFR BLD: 30 % (ref 18–48)
MAGNESIUM SERPL-MCNC: 2.1 MG/DL (ref 1.6–2.6)
MCH RBC QN AUTO: 28.1 PG (ref 27–31)
MCHC RBC AUTO-ENTMCNC: 31.3 G/DL (ref 32–36)
MCV RBC AUTO: 90 FL (ref 82–98)
MONOCYTES # BLD AUTO: 0.7 K/UL (ref 0.3–1)
MONOCYTES NFR BLD: 8.7 % (ref 4–15)
NEUTROPHILS # BLD AUTO: 4.5 K/UL (ref 1.8–7.7)
NEUTROPHILS NFR BLD: 59 % (ref 38–73)
NONHDLC SERPL-MCNC: 179 MG/DL
NRBC BLD-RTO: 0 /100 WBC
PLATELET # BLD AUTO: 274 K/UL (ref 150–350)
PMV BLD AUTO: 9.6 FL (ref 9.2–12.9)
POTASSIUM SERPL-SCNC: 3.6 MMOL/L (ref 3.5–5.1)
PROT SERPL-MCNC: 7.1 G/DL (ref 6–8.4)
RBC # BLD AUTO: 5.01 M/UL (ref 4.6–6.2)
SODIUM SERPL-SCNC: 140 MMOL/L (ref 136–145)
TRIGL SERPL-MCNC: 180 MG/DL (ref 30–150)
TSH SERPL DL<=0.005 MIU/L-ACNC: 1.66 UIU/ML (ref 0.4–4)
WBC # BLD AUTO: 7.7 K/UL (ref 3.9–12.7)

## 2021-01-08 PROCEDURE — 80053 COMPREHEN METABOLIC PANEL: CPT

## 2021-01-08 PROCEDURE — 83735 ASSAY OF MAGNESIUM: CPT

## 2021-01-08 PROCEDURE — 84443 ASSAY THYROID STIM HORMONE: CPT

## 2021-01-08 PROCEDURE — 86803 HEPATITIS C AB TEST: CPT

## 2021-01-08 PROCEDURE — 85025 COMPLETE CBC W/AUTO DIFF WBC: CPT

## 2021-01-08 PROCEDURE — 36415 COLL VENOUS BLD VENIPUNCTURE: CPT | Mod: PN

## 2021-01-08 PROCEDURE — 83036 HEMOGLOBIN GLYCOSYLATED A1C: CPT

## 2021-01-08 PROCEDURE — 80061 LIPID PANEL: CPT

## 2021-01-08 PROCEDURE — 82306 VITAMIN D 25 HYDROXY: CPT

## 2021-01-11 LAB — HCV AB SERPL QL IA: NEGATIVE

## 2021-01-14 ENCOUNTER — TELEPHONE (OUTPATIENT)
Dept: FAMILY MEDICINE | Facility: CLINIC | Age: 41
End: 2021-01-14

## 2021-01-14 ENCOUNTER — OFFICE VISIT (OUTPATIENT)
Dept: FAMILY MEDICINE | Facility: CLINIC | Age: 41
End: 2021-01-14
Payer: COMMERCIAL

## 2021-01-14 VITALS
OXYGEN SATURATION: 97 % | BODY MASS INDEX: 40.48 KG/M2 | TEMPERATURE: 98 F | HEART RATE: 72 BPM | SYSTOLIC BLOOD PRESSURE: 144 MMHG | DIASTOLIC BLOOD PRESSURE: 96 MMHG | HEIGHT: 71 IN | WEIGHT: 289.13 LBS | RESPIRATION RATE: 18 BRPM

## 2021-01-14 DIAGNOSIS — Z00.00 PREVENTATIVE HEALTH CARE: Primary | ICD-10-CM

## 2021-01-14 DIAGNOSIS — E78.5 DYSLIPIDEMIA: ICD-10-CM

## 2021-01-14 DIAGNOSIS — E55.9 VITAMIN D DEFICIENCY: ICD-10-CM

## 2021-01-14 DIAGNOSIS — I10 ESSENTIAL HYPERTENSION: ICD-10-CM

## 2021-01-14 DIAGNOSIS — E66.01 CLASS 2 SEVERE OBESITY DUE TO EXCESS CALORIES WITH SERIOUS COMORBIDITY AND BODY MASS INDEX (BMI) OF 39.0 TO 39.9 IN ADULT: ICD-10-CM

## 2021-01-14 PROCEDURE — 99396 PR PREVENTIVE VISIT,EST,40-64: ICD-10-PCS | Mod: S$GLB,,, | Performed by: INTERNAL MEDICINE

## 2021-01-14 PROCEDURE — 99999 PR PBB SHADOW E&M-EST. PATIENT-LVL IV: CPT | Mod: PBBFAC,,, | Performed by: INTERNAL MEDICINE

## 2021-01-14 PROCEDURE — 99999 PR PBB SHADOW E&M-EST. PATIENT-LVL IV: ICD-10-PCS | Mod: PBBFAC,,, | Performed by: INTERNAL MEDICINE

## 2021-01-14 PROCEDURE — 99396 PREV VISIT EST AGE 40-64: CPT | Mod: S$GLB,,, | Performed by: INTERNAL MEDICINE

## 2021-01-21 ENCOUNTER — PATIENT MESSAGE (OUTPATIENT)
Dept: ADMINISTRATIVE | Facility: OTHER | Age: 41
End: 2021-01-21

## 2021-01-25 ENCOUNTER — OFFICE VISIT (OUTPATIENT)
Dept: ORTHOPEDICS | Facility: CLINIC | Age: 41
End: 2021-01-25
Payer: COMMERCIAL

## 2021-01-25 VITALS
HEART RATE: 70 BPM | BODY MASS INDEX: 39.9 KG/M2 | WEIGHT: 285 LBS | SYSTOLIC BLOOD PRESSURE: 132 MMHG | DIASTOLIC BLOOD PRESSURE: 90 MMHG | HEIGHT: 71 IN

## 2021-01-25 DIAGNOSIS — Z98.890 STATUS POST CARPAL TUNNEL RELEASE: Primary | ICD-10-CM

## 2021-01-25 PROCEDURE — 99024 PR POST-OP FOLLOW-UP VISIT: ICD-10-PCS | Mod: S$GLB,,, | Performed by: ORTHOPAEDIC SURGERY

## 2021-01-25 PROCEDURE — 99999 PR PBB SHADOW E&M-EST. PATIENT-LVL III: CPT | Mod: PBBFAC,,, | Performed by: ORTHOPAEDIC SURGERY

## 2021-01-25 PROCEDURE — 99999 PR PBB SHADOW E&M-EST. PATIENT-LVL III: ICD-10-PCS | Mod: PBBFAC,,, | Performed by: ORTHOPAEDIC SURGERY

## 2021-01-25 PROCEDURE — 99024 POSTOP FOLLOW-UP VISIT: CPT | Mod: S$GLB,,, | Performed by: ORTHOPAEDIC SURGERY

## 2021-01-26 ENCOUNTER — PATIENT MESSAGE (OUTPATIENT)
Dept: ORTHOPEDICS | Facility: CLINIC | Age: 41
End: 2021-01-26

## 2021-02-01 ENCOUNTER — PATIENT MESSAGE (OUTPATIENT)
Dept: ORTHOPEDICS | Facility: CLINIC | Age: 41
End: 2021-02-01

## 2021-02-02 ENCOUNTER — PATIENT MESSAGE (OUTPATIENT)
Dept: ORTHOPEDICS | Facility: CLINIC | Age: 41
End: 2021-02-02

## 2021-02-03 ENCOUNTER — PATIENT MESSAGE (OUTPATIENT)
Dept: ORTHOPEDICS | Facility: CLINIC | Age: 41
End: 2021-02-03

## 2021-02-04 DIAGNOSIS — E87.6 HYPOKALEMIA: ICD-10-CM

## 2021-02-04 RX ORDER — POTASSIUM CHLORIDE 750 MG/1
10 TABLET, EXTENDED RELEASE ORAL 2 TIMES DAILY
Qty: 60 TABLET | Refills: 3 | Status: SHIPPED | OUTPATIENT
Start: 2021-02-04 | End: 2021-05-24 | Stop reason: SDUPTHER

## 2021-02-09 ENCOUNTER — PATIENT MESSAGE (OUTPATIENT)
Dept: OTHER | Facility: OTHER | Age: 41
End: 2021-02-09

## 2021-03-11 ENCOUNTER — PATIENT MESSAGE (OUTPATIENT)
Dept: FAMILY MEDICINE | Facility: CLINIC | Age: 41
End: 2021-03-11

## 2021-03-11 ENCOUNTER — NURSE TRIAGE (OUTPATIENT)
Dept: ADMINISTRATIVE | Facility: CLINIC | Age: 41
End: 2021-03-11

## 2021-03-12 ENCOUNTER — PATIENT MESSAGE (OUTPATIENT)
Dept: FAMILY MEDICINE | Facility: CLINIC | Age: 41
End: 2021-03-12

## 2021-03-18 DIAGNOSIS — I10 HYPERTENSION, UNSPECIFIED TYPE: ICD-10-CM

## 2021-03-18 RX ORDER — VALSARTAN 320 MG/1
320 TABLET ORAL DAILY
Qty: 90 TABLET | Refills: 3 | Status: SHIPPED | OUTPATIENT
Start: 2021-03-18 | End: 2021-12-14 | Stop reason: SDUPTHER

## 2021-03-27 ENCOUNTER — LAB VISIT (OUTPATIENT)
Dept: URGENT CARE | Facility: CLINIC | Age: 41
End: 2021-03-27
Payer: COMMERCIAL

## 2021-03-27 DIAGNOSIS — Z20.822 ENCOUNTER FOR LABORATORY TESTING FOR COVID-19 VIRUS: ICD-10-CM

## 2021-03-27 LAB — SARS-COV-2 RNA RESP QL NAA+PROBE: NOT DETECTED

## 2021-03-27 PROCEDURE — U0003 INFECTIOUS AGENT DETECTION BY NUCLEIC ACID (DNA OR RNA); SEVERE ACUTE RESPIRATORY SYNDROME CORONAVIRUS 2 (SARS-COV-2) (CORONAVIRUS DISEASE [COVID-19]), AMPLIFIED PROBE TECHNIQUE, MAKING USE OF HIGH THROUGHPUT TECHNOLOGIES AS DESCRIBED BY CMS-2020-01-R: HCPCS | Performed by: EMERGENCY MEDICINE

## 2021-04-15 ENCOUNTER — OFFICE VISIT (OUTPATIENT)
Dept: FAMILY MEDICINE | Facility: CLINIC | Age: 41
End: 2021-04-15
Payer: COMMERCIAL

## 2021-04-15 VITALS
HEIGHT: 71 IN | HEART RATE: 64 BPM | DIASTOLIC BLOOD PRESSURE: 82 MMHG | TEMPERATURE: 98 F | SYSTOLIC BLOOD PRESSURE: 130 MMHG | OXYGEN SATURATION: 95 % | WEIGHT: 282.06 LBS | BODY MASS INDEX: 39.49 KG/M2

## 2021-04-15 DIAGNOSIS — I10 ESSENTIAL HYPERTENSION: ICD-10-CM

## 2021-04-15 DIAGNOSIS — G43.109 MIGRAINE WITH AURA AND WITHOUT STATUS MIGRAINOSUS, NOT INTRACTABLE: Primary | ICD-10-CM

## 2021-04-15 PROCEDURE — 99999 PR PBB SHADOW E&M-EST. PATIENT-LVL III: CPT | Mod: PBBFAC,,, | Performed by: INTERNAL MEDICINE

## 2021-04-15 PROCEDURE — 99999 PR PBB SHADOW E&M-EST. PATIENT-LVL III: ICD-10-PCS | Mod: PBBFAC,,, | Performed by: INTERNAL MEDICINE

## 2021-04-15 PROCEDURE — 3008F PR BODY MASS INDEX (BMI) DOCUMENTED: ICD-10-PCS | Mod: CPTII,S$GLB,, | Performed by: INTERNAL MEDICINE

## 2021-04-15 PROCEDURE — 99214 OFFICE O/P EST MOD 30 MIN: CPT | Mod: S$GLB,,, | Performed by: INTERNAL MEDICINE

## 2021-04-15 PROCEDURE — 3008F BODY MASS INDEX DOCD: CPT | Mod: CPTII,S$GLB,, | Performed by: INTERNAL MEDICINE

## 2021-04-15 PROCEDURE — 99214 PR OFFICE/OUTPT VISIT, EST, LEVL IV, 30-39 MIN: ICD-10-PCS | Mod: S$GLB,,, | Performed by: INTERNAL MEDICINE

## 2021-04-15 PROCEDURE — 1126F PR PAIN SEVERITY QUANTIFIED, NO PAIN PRESENT: ICD-10-PCS | Mod: S$GLB,,, | Performed by: INTERNAL MEDICINE

## 2021-04-15 PROCEDURE — 1126F AMNT PAIN NOTED NONE PRSNT: CPT | Mod: S$GLB,,, | Performed by: INTERNAL MEDICINE

## 2021-04-15 RX ORDER — SUMATRIPTAN 50 MG/1
50 TABLET, FILM COATED ORAL DAILY PRN
Qty: 9 TABLET | Refills: 1 | Status: SHIPPED | OUTPATIENT
Start: 2021-04-15 | End: 2022-04-27 | Stop reason: SDUPTHER

## 2021-05-06 ENCOUNTER — PATIENT MESSAGE (OUTPATIENT)
Dept: ADMINISTRATIVE | Facility: OTHER | Age: 41
End: 2021-05-06

## 2021-05-24 DIAGNOSIS — E87.6 HYPOKALEMIA: ICD-10-CM

## 2021-05-24 RX ORDER — POTASSIUM CHLORIDE 750 MG/1
10 TABLET, EXTENDED RELEASE ORAL 2 TIMES DAILY
Qty: 60 TABLET | Refills: 3 | Status: SHIPPED | OUTPATIENT
Start: 2021-05-24 | End: 2021-06-03 | Stop reason: SDUPTHER

## 2021-06-03 DIAGNOSIS — E87.6 HYPOKALEMIA: ICD-10-CM

## 2021-06-03 RX ORDER — POTASSIUM CHLORIDE 750 MG/1
10 TABLET, EXTENDED RELEASE ORAL 2 TIMES DAILY
Qty: 180 TABLET | Refills: 3 | Status: SHIPPED | OUTPATIENT
Start: 2021-06-03 | End: 2021-12-14 | Stop reason: SDUPTHER

## 2021-06-09 ENCOUNTER — PATIENT MESSAGE (OUTPATIENT)
Dept: ORTHOPEDICS | Facility: CLINIC | Age: 41
End: 2021-06-09

## 2021-06-11 ENCOUNTER — OFFICE VISIT (OUTPATIENT)
Dept: ORTHOPEDICS | Facility: CLINIC | Age: 41
End: 2021-06-11
Payer: COMMERCIAL

## 2021-06-11 VITALS
SYSTOLIC BLOOD PRESSURE: 135 MMHG | HEART RATE: 64 BPM | WEIGHT: 282 LBS | HEIGHT: 71 IN | BODY MASS INDEX: 39.48 KG/M2 | DIASTOLIC BLOOD PRESSURE: 88 MMHG

## 2021-06-11 DIAGNOSIS — R60.0 EDEMA OF HAND: Primary | ICD-10-CM

## 2021-06-11 PROCEDURE — 99213 PR OFFICE/OUTPT VISIT, EST, LEVL III, 20-29 MIN: ICD-10-PCS | Mod: S$GLB,,, | Performed by: ORTHOPAEDIC SURGERY

## 2021-06-11 PROCEDURE — 1126F PR PAIN SEVERITY QUANTIFIED, NO PAIN PRESENT: ICD-10-PCS | Mod: S$GLB,,, | Performed by: ORTHOPAEDIC SURGERY

## 2021-06-11 PROCEDURE — 99999 PR PBB SHADOW E&M-EST. PATIENT-LVL III: CPT | Mod: PBBFAC,,, | Performed by: ORTHOPAEDIC SURGERY

## 2021-06-11 PROCEDURE — 1126F AMNT PAIN NOTED NONE PRSNT: CPT | Mod: S$GLB,,, | Performed by: ORTHOPAEDIC SURGERY

## 2021-06-11 PROCEDURE — 3008F PR BODY MASS INDEX (BMI) DOCUMENTED: ICD-10-PCS | Mod: CPTII,S$GLB,, | Performed by: ORTHOPAEDIC SURGERY

## 2021-06-11 PROCEDURE — 99999 PR PBB SHADOW E&M-EST. PATIENT-LVL III: ICD-10-PCS | Mod: PBBFAC,,, | Performed by: ORTHOPAEDIC SURGERY

## 2021-06-11 PROCEDURE — 3008F BODY MASS INDEX DOCD: CPT | Mod: CPTII,S$GLB,, | Performed by: ORTHOPAEDIC SURGERY

## 2021-06-11 PROCEDURE — 99213 OFFICE O/P EST LOW 20 MIN: CPT | Mod: S$GLB,,, | Performed by: ORTHOPAEDIC SURGERY

## 2021-06-15 ENCOUNTER — LAB VISIT (OUTPATIENT)
Dept: LAB | Facility: HOSPITAL | Age: 41
End: 2021-06-15
Attending: INTERNAL MEDICINE
Payer: COMMERCIAL

## 2021-06-15 DIAGNOSIS — E78.5 DYSLIPIDEMIA: ICD-10-CM

## 2021-06-15 DIAGNOSIS — E66.01 CLASS 2 SEVERE OBESITY DUE TO EXCESS CALORIES WITH SERIOUS COMORBIDITY AND BODY MASS INDEX (BMI) OF 39.0 TO 39.9 IN ADULT: ICD-10-CM

## 2021-06-15 DIAGNOSIS — I10 ESSENTIAL HYPERTENSION: ICD-10-CM

## 2021-06-15 DIAGNOSIS — E55.9 VITAMIN D DEFICIENCY: ICD-10-CM

## 2021-06-15 LAB
25(OH)D3+25(OH)D2 SERPL-MCNC: 30 NG/ML (ref 30–96)
ALBUMIN SERPL BCP-MCNC: 3.8 G/DL (ref 3.5–5.2)
ALP SERPL-CCNC: 88 U/L (ref 55–135)
ALT SERPL W/O P-5'-P-CCNC: 35 U/L (ref 10–44)
ANION GAP SERPL CALC-SCNC: 11 MMOL/L (ref 8–16)
AST SERPL-CCNC: 20 U/L (ref 10–40)
BILIRUB SERPL-MCNC: 0.6 MG/DL (ref 0.1–1)
BUN SERPL-MCNC: 17 MG/DL (ref 6–20)
CALCIUM SERPL-MCNC: 9.4 MG/DL (ref 8.7–10.5)
CHLORIDE SERPL-SCNC: 108 MMOL/L (ref 95–110)
CHOLEST SERPL-MCNC: 185 MG/DL (ref 120–199)
CHOLEST/HDLC SERPL: 6 {RATIO} (ref 2–5)
CO2 SERPL-SCNC: 22 MMOL/L (ref 23–29)
CREAT SERPL-MCNC: 0.9 MG/DL (ref 0.5–1.4)
EST. GFR  (AFRICAN AMERICAN): >60 ML/MIN/1.73 M^2
EST. GFR  (NON AFRICAN AMERICAN): >60 ML/MIN/1.73 M^2
ESTIMATED AVG GLUCOSE: 105 MG/DL (ref 68–131)
GLUCOSE SERPL-MCNC: 100 MG/DL (ref 70–110)
HBA1C MFR BLD: 5.3 % (ref 4–5.6)
HDLC SERPL-MCNC: 31 MG/DL (ref 40–75)
HDLC SERPL: 16.8 % (ref 20–50)
LDLC SERPL CALC-MCNC: 116.8 MG/DL (ref 63–159)
NONHDLC SERPL-MCNC: 154 MG/DL
POTASSIUM SERPL-SCNC: 3.5 MMOL/L (ref 3.5–5.1)
PROT SERPL-MCNC: 7 G/DL (ref 6–8.4)
SODIUM SERPL-SCNC: 141 MMOL/L (ref 136–145)
TRIGL SERPL-MCNC: 186 MG/DL (ref 30–150)

## 2021-06-15 PROCEDURE — 83036 HEMOGLOBIN GLYCOSYLATED A1C: CPT | Performed by: INTERNAL MEDICINE

## 2021-06-15 PROCEDURE — 82306 VITAMIN D 25 HYDROXY: CPT | Performed by: INTERNAL MEDICINE

## 2021-06-15 PROCEDURE — 80061 LIPID PANEL: CPT | Performed by: INTERNAL MEDICINE

## 2021-06-15 PROCEDURE — 36415 COLL VENOUS BLD VENIPUNCTURE: CPT | Mod: PN | Performed by: INTERNAL MEDICINE

## 2021-06-15 PROCEDURE — 80053 COMPREHEN METABOLIC PANEL: CPT | Performed by: INTERNAL MEDICINE

## 2021-06-29 ENCOUNTER — OFFICE VISIT (OUTPATIENT)
Dept: FAMILY MEDICINE | Facility: CLINIC | Age: 41
End: 2021-06-29
Payer: COMMERCIAL

## 2021-06-29 VITALS
SYSTOLIC BLOOD PRESSURE: 134 MMHG | DIASTOLIC BLOOD PRESSURE: 84 MMHG | OXYGEN SATURATION: 98 % | RESPIRATION RATE: 16 BRPM | HEIGHT: 71 IN | WEIGHT: 281.06 LBS | BODY MASS INDEX: 39.35 KG/M2 | TEMPERATURE: 98 F | HEART RATE: 64 BPM

## 2021-06-29 DIAGNOSIS — I10 ESSENTIAL HYPERTENSION: Primary | ICD-10-CM

## 2021-06-29 DIAGNOSIS — Z00.00 PREVENTATIVE HEALTH CARE: ICD-10-CM

## 2021-06-29 DIAGNOSIS — E78.5 DYSLIPIDEMIA: ICD-10-CM

## 2021-06-29 DIAGNOSIS — E55.9 VITAMIN D DEFICIENCY: ICD-10-CM

## 2021-06-29 PROCEDURE — 99214 OFFICE O/P EST MOD 30 MIN: CPT | Mod: S$GLB,,, | Performed by: INTERNAL MEDICINE

## 2021-06-29 PROCEDURE — 1126F AMNT PAIN NOTED NONE PRSNT: CPT | Mod: S$GLB,,, | Performed by: INTERNAL MEDICINE

## 2021-06-29 PROCEDURE — 99999 PR PBB SHADOW E&M-EST. PATIENT-LVL IV: CPT | Mod: PBBFAC,,, | Performed by: INTERNAL MEDICINE

## 2021-06-29 PROCEDURE — 3075F SYST BP GE 130 - 139MM HG: CPT | Mod: CPTII,S$GLB,, | Performed by: INTERNAL MEDICINE

## 2021-06-29 PROCEDURE — 99999 PR PBB SHADOW E&M-EST. PATIENT-LVL IV: ICD-10-PCS | Mod: PBBFAC,,, | Performed by: INTERNAL MEDICINE

## 2021-06-29 PROCEDURE — 3008F PR BODY MASS INDEX (BMI) DOCUMENTED: ICD-10-PCS | Mod: CPTII,S$GLB,, | Performed by: INTERNAL MEDICINE

## 2021-06-29 PROCEDURE — 3008F BODY MASS INDEX DOCD: CPT | Mod: CPTII,S$GLB,, | Performed by: INTERNAL MEDICINE

## 2021-06-29 PROCEDURE — 3075F PR MOST RECENT SYSTOLIC BLOOD PRESS GE 130-139MM HG: ICD-10-PCS | Mod: CPTII,S$GLB,, | Performed by: INTERNAL MEDICINE

## 2021-06-29 PROCEDURE — 1126F PR PAIN SEVERITY QUANTIFIED, NO PAIN PRESENT: ICD-10-PCS | Mod: S$GLB,,, | Performed by: INTERNAL MEDICINE

## 2021-06-29 PROCEDURE — 3079F DIAST BP 80-89 MM HG: CPT | Mod: CPTII,S$GLB,, | Performed by: INTERNAL MEDICINE

## 2021-06-29 PROCEDURE — 3079F PR MOST RECENT DIASTOLIC BLOOD PRESSURE 80-89 MM HG: ICD-10-PCS | Mod: CPTII,S$GLB,, | Performed by: INTERNAL MEDICINE

## 2021-06-29 PROCEDURE — 99214 PR OFFICE/OUTPT VISIT, EST, LEVL IV, 30-39 MIN: ICD-10-PCS | Mod: S$GLB,,, | Performed by: INTERNAL MEDICINE

## 2021-08-02 ENCOUNTER — OFFICE VISIT (OUTPATIENT)
Dept: ORTHOPEDICS | Facility: CLINIC | Age: 41
End: 2021-08-02
Payer: COMMERCIAL

## 2021-08-02 VITALS
HEIGHT: 71 IN | HEART RATE: 71 BPM | DIASTOLIC BLOOD PRESSURE: 85 MMHG | WEIGHT: 281 LBS | SYSTOLIC BLOOD PRESSURE: 129 MMHG | BODY MASS INDEX: 39.34 KG/M2

## 2021-08-02 DIAGNOSIS — M65.331 TRIGGER FINGER, RIGHT MIDDLE FINGER: Primary | ICD-10-CM

## 2021-08-02 PROCEDURE — 3079F PR MOST RECENT DIASTOLIC BLOOD PRESSURE 80-89 MM HG: ICD-10-PCS | Mod: CPTII,S$GLB,, | Performed by: ORTHOPAEDIC SURGERY

## 2021-08-02 PROCEDURE — 99999 PR PBB SHADOW E&M-EST. PATIENT-LVL III: ICD-10-PCS | Mod: PBBFAC,,, | Performed by: ORTHOPAEDIC SURGERY

## 2021-08-02 PROCEDURE — 1125F PR PAIN SEVERITY QUANTIFIED, PAIN PRESENT: ICD-10-PCS | Mod: CPTII,S$GLB,, | Performed by: ORTHOPAEDIC SURGERY

## 2021-08-02 PROCEDURE — 3044F HG A1C LEVEL LT 7.0%: CPT | Mod: CPTII,S$GLB,, | Performed by: ORTHOPAEDIC SURGERY

## 2021-08-02 PROCEDURE — 1125F AMNT PAIN NOTED PAIN PRSNT: CPT | Mod: CPTII,S$GLB,, | Performed by: ORTHOPAEDIC SURGERY

## 2021-08-02 PROCEDURE — 3074F SYST BP LT 130 MM HG: CPT | Mod: CPTII,S$GLB,, | Performed by: ORTHOPAEDIC SURGERY

## 2021-08-02 PROCEDURE — 99213 OFFICE O/P EST LOW 20 MIN: CPT | Mod: 25,S$GLB,, | Performed by: ORTHOPAEDIC SURGERY

## 2021-08-02 PROCEDURE — 1159F PR MEDICATION LIST DOCUMENTED IN MEDICAL RECORD: ICD-10-PCS | Mod: CPTII,S$GLB,, | Performed by: ORTHOPAEDIC SURGERY

## 2021-08-02 PROCEDURE — 3079F DIAST BP 80-89 MM HG: CPT | Mod: CPTII,S$GLB,, | Performed by: ORTHOPAEDIC SURGERY

## 2021-08-02 PROCEDURE — 3044F PR MOST RECENT HEMOGLOBIN A1C LEVEL <7.0%: ICD-10-PCS | Mod: CPTII,S$GLB,, | Performed by: ORTHOPAEDIC SURGERY

## 2021-08-02 PROCEDURE — 3008F BODY MASS INDEX DOCD: CPT | Mod: CPTII,S$GLB,, | Performed by: ORTHOPAEDIC SURGERY

## 2021-08-02 PROCEDURE — 99213 PR OFFICE/OUTPT VISIT, EST, LEVL III, 20-29 MIN: ICD-10-PCS | Mod: 25,S$GLB,, | Performed by: ORTHOPAEDIC SURGERY

## 2021-08-02 PROCEDURE — 3074F PR MOST RECENT SYSTOLIC BLOOD PRESSURE < 130 MM HG: ICD-10-PCS | Mod: CPTII,S$GLB,, | Performed by: ORTHOPAEDIC SURGERY

## 2021-08-02 PROCEDURE — 1160F RVW MEDS BY RX/DR IN RCRD: CPT | Mod: CPTII,S$GLB,, | Performed by: ORTHOPAEDIC SURGERY

## 2021-08-02 PROCEDURE — 1160F PR REVIEW ALL MEDS BY PRESCRIBER/CLIN PHARMACIST DOCUMENTED: ICD-10-PCS | Mod: CPTII,S$GLB,, | Performed by: ORTHOPAEDIC SURGERY

## 2021-08-02 PROCEDURE — 1159F MED LIST DOCD IN RCRD: CPT | Mod: CPTII,S$GLB,, | Performed by: ORTHOPAEDIC SURGERY

## 2021-08-02 PROCEDURE — 3008F PR BODY MASS INDEX (BMI) DOCUMENTED: ICD-10-PCS | Mod: CPTII,S$GLB,, | Performed by: ORTHOPAEDIC SURGERY

## 2021-08-02 PROCEDURE — 20550 TENDON SHEATH: ICD-10-PCS | Mod: RT,S$GLB,, | Performed by: ORTHOPAEDIC SURGERY

## 2021-08-02 PROCEDURE — 99999 PR PBB SHADOW E&M-EST. PATIENT-LVL III: CPT | Mod: PBBFAC,,, | Performed by: ORTHOPAEDIC SURGERY

## 2021-08-02 PROCEDURE — 20550 NJX 1 TENDON SHEATH/LIGAMENT: CPT | Mod: RT,S$GLB,, | Performed by: ORTHOPAEDIC SURGERY

## 2021-08-02 RX ORDER — TRIAMCINOLONE ACETONIDE 40 MG/ML
40 INJECTION, SUSPENSION INTRA-ARTICULAR; INTRAMUSCULAR
Status: DISCONTINUED | OUTPATIENT
Start: 2021-08-02 | End: 2021-08-02 | Stop reason: HOSPADM

## 2021-08-02 RX ADMIN — TRIAMCINOLONE ACETONIDE 40 MG: 40 INJECTION, SUSPENSION INTRA-ARTICULAR; INTRAMUSCULAR at 01:08

## 2021-08-15 ENCOUNTER — PATIENT MESSAGE (OUTPATIENT)
Dept: ORTHOPEDICS | Facility: CLINIC | Age: 41
End: 2021-08-15

## 2021-09-20 ENCOUNTER — OFFICE VISIT (OUTPATIENT)
Dept: ORTHOPEDICS | Facility: CLINIC | Age: 41
End: 2021-09-20
Payer: COMMERCIAL

## 2021-09-20 VITALS
DIASTOLIC BLOOD PRESSURE: 81 MMHG | BODY MASS INDEX: 39.35 KG/M2 | SYSTOLIC BLOOD PRESSURE: 141 MMHG | HEIGHT: 71 IN | WEIGHT: 281.06 LBS | HEART RATE: 63 BPM

## 2021-09-20 DIAGNOSIS — M65.331 TRIGGER FINGER, RIGHT MIDDLE FINGER: Primary | ICD-10-CM

## 2021-09-20 PROCEDURE — 3079F PR MOST RECENT DIASTOLIC BLOOD PRESSURE 80-89 MM HG: ICD-10-PCS | Mod: CPTII,S$GLB,, | Performed by: ORTHOPAEDIC SURGERY

## 2021-09-20 PROCEDURE — 99213 PR OFFICE/OUTPT VISIT, EST, LEVL III, 20-29 MIN: ICD-10-PCS | Mod: S$GLB,,, | Performed by: ORTHOPAEDIC SURGERY

## 2021-09-20 PROCEDURE — 99213 OFFICE O/P EST LOW 20 MIN: CPT | Mod: S$GLB,,, | Performed by: ORTHOPAEDIC SURGERY

## 2021-09-20 PROCEDURE — 1160F RVW MEDS BY RX/DR IN RCRD: CPT | Mod: CPTII,S$GLB,, | Performed by: ORTHOPAEDIC SURGERY

## 2021-09-20 PROCEDURE — 1159F MED LIST DOCD IN RCRD: CPT | Mod: CPTII,S$GLB,, | Performed by: ORTHOPAEDIC SURGERY

## 2021-09-20 PROCEDURE — 3077F PR MOST RECENT SYSTOLIC BLOOD PRESSURE >= 140 MM HG: ICD-10-PCS | Mod: CPTII,S$GLB,, | Performed by: ORTHOPAEDIC SURGERY

## 2021-09-20 PROCEDURE — 3008F BODY MASS INDEX DOCD: CPT | Mod: CPTII,S$GLB,, | Performed by: ORTHOPAEDIC SURGERY

## 2021-09-20 PROCEDURE — 4010F ACE/ARB THERAPY RXD/TAKEN: CPT | Mod: CPTII,S$GLB,, | Performed by: ORTHOPAEDIC SURGERY

## 2021-09-20 PROCEDURE — 3077F SYST BP >= 140 MM HG: CPT | Mod: CPTII,S$GLB,, | Performed by: ORTHOPAEDIC SURGERY

## 2021-09-20 PROCEDURE — 99999 PR PBB SHADOW E&M-EST. PATIENT-LVL III: ICD-10-PCS | Mod: PBBFAC,,, | Performed by: ORTHOPAEDIC SURGERY

## 2021-09-20 PROCEDURE — 1160F PR REVIEW ALL MEDS BY PRESCRIBER/CLIN PHARMACIST DOCUMENTED: ICD-10-PCS | Mod: CPTII,S$GLB,, | Performed by: ORTHOPAEDIC SURGERY

## 2021-09-20 PROCEDURE — 3044F HG A1C LEVEL LT 7.0%: CPT | Mod: CPTII,S$GLB,, | Performed by: ORTHOPAEDIC SURGERY

## 2021-09-20 PROCEDURE — 99999 PR PBB SHADOW E&M-EST. PATIENT-LVL III: CPT | Mod: PBBFAC,,, | Performed by: ORTHOPAEDIC SURGERY

## 2021-09-20 PROCEDURE — 3079F DIAST BP 80-89 MM HG: CPT | Mod: CPTII,S$GLB,, | Performed by: ORTHOPAEDIC SURGERY

## 2021-09-20 PROCEDURE — 1159F PR MEDICATION LIST DOCUMENTED IN MEDICAL RECORD: ICD-10-PCS | Mod: CPTII,S$GLB,, | Performed by: ORTHOPAEDIC SURGERY

## 2021-09-20 PROCEDURE — 3008F PR BODY MASS INDEX (BMI) DOCUMENTED: ICD-10-PCS | Mod: CPTII,S$GLB,, | Performed by: ORTHOPAEDIC SURGERY

## 2021-09-20 PROCEDURE — 3044F PR MOST RECENT HEMOGLOBIN A1C LEVEL <7.0%: ICD-10-PCS | Mod: CPTII,S$GLB,, | Performed by: ORTHOPAEDIC SURGERY

## 2021-09-20 PROCEDURE — 4010F PR ACE/ARB THEARPY RXD/TAKEN: ICD-10-PCS | Mod: CPTII,S$GLB,, | Performed by: ORTHOPAEDIC SURGERY

## 2021-09-20 RX ORDER — MELOXICAM 15 MG/1
15 TABLET ORAL DAILY
Qty: 14 TABLET | Refills: 0 | Status: SHIPPED | OUTPATIENT
Start: 2021-09-20 | End: 2021-10-04

## 2021-12-14 DIAGNOSIS — E87.6 HYPOKALEMIA: ICD-10-CM

## 2021-12-14 DIAGNOSIS — I10 HYPERTENSION, UNSPECIFIED TYPE: ICD-10-CM

## 2021-12-15 DIAGNOSIS — E87.6 HYPOKALEMIA: ICD-10-CM

## 2021-12-17 RX ORDER — POTASSIUM CHLORIDE 750 MG/1
10 TABLET, EXTENDED RELEASE ORAL 2 TIMES DAILY
Qty: 180 TABLET | Refills: 1 | Status: SHIPPED | OUTPATIENT
Start: 2021-12-17 | End: 2022-04-27 | Stop reason: SDUPTHER

## 2021-12-20 RX ORDER — POTASSIUM CHLORIDE 750 MG/1
10 TABLET, EXTENDED RELEASE ORAL 2 TIMES DAILY
Qty: 180 TABLET | Refills: 3 | OUTPATIENT
Start: 2021-12-20

## 2021-12-21 RX ORDER — VALSARTAN 320 MG/1
320 TABLET ORAL DAILY
Qty: 90 TABLET | Refills: 1 | Status: SHIPPED | OUTPATIENT
Start: 2021-12-21 | End: 2022-04-27 | Stop reason: SDUPTHER

## 2021-12-21 RX ORDER — AMLODIPINE BESYLATE 5 MG/1
5 TABLET ORAL DAILY
Qty: 90 TABLET | Refills: 1 | Status: SHIPPED | OUTPATIENT
Start: 2021-12-21 | End: 2022-04-27 | Stop reason: SDUPTHER

## 2022-01-05 ENCOUNTER — OFFICE VISIT (OUTPATIENT)
Dept: DERMATOLOGY | Facility: CLINIC | Age: 42
End: 2022-01-05
Payer: COMMERCIAL

## 2022-01-05 DIAGNOSIS — D48.5 NEOPLASM OF UNCERTAIN BEHAVIOR OF SKIN: Primary | ICD-10-CM

## 2022-01-05 DIAGNOSIS — D23.5: ICD-10-CM

## 2022-01-05 DIAGNOSIS — D22.9 MULTIPLE BENIGN NEVI: ICD-10-CM

## 2022-01-05 DIAGNOSIS — D23.9 DERMATOFIBROMA: ICD-10-CM

## 2022-01-05 PROCEDURE — 88305 TISSUE EXAM BY PATHOLOGIST: CPT | Mod: 26,,, | Performed by: PATHOLOGY

## 2022-01-05 PROCEDURE — 1159F MED LIST DOCD IN RCRD: CPT | Mod: CPTII,S$GLB,, | Performed by: STUDENT IN AN ORGANIZED HEALTH CARE EDUCATION/TRAINING PROGRAM

## 2022-01-05 PROCEDURE — 1160F PR REVIEW ALL MEDS BY PRESCRIBER/CLIN PHARMACIST DOCUMENTED: ICD-10-PCS | Mod: CPTII,S$GLB,, | Performed by: STUDENT IN AN ORGANIZED HEALTH CARE EDUCATION/TRAINING PROGRAM

## 2022-01-05 PROCEDURE — 1160F RVW MEDS BY RX/DR IN RCRD: CPT | Mod: CPTII,S$GLB,, | Performed by: STUDENT IN AN ORGANIZED HEALTH CARE EDUCATION/TRAINING PROGRAM

## 2022-01-05 PROCEDURE — 99203 OFFICE O/P NEW LOW 30 MIN: CPT | Mod: 25,S$GLB,, | Performed by: STUDENT IN AN ORGANIZED HEALTH CARE EDUCATION/TRAINING PROGRAM

## 2022-01-05 PROCEDURE — 88305 TISSUE EXAM BY PATHOLOGIST: CPT | Performed by: PATHOLOGY

## 2022-01-05 PROCEDURE — 11102 PR TANGENTIAL BIOPSY, SKIN, SINGLE LESION: ICD-10-PCS | Mod: S$GLB,,, | Performed by: STUDENT IN AN ORGANIZED HEALTH CARE EDUCATION/TRAINING PROGRAM

## 2022-01-05 PROCEDURE — 99999 PR PBB SHADOW E&M-EST. PATIENT-LVL III: ICD-10-PCS | Mod: PBBFAC,,, | Performed by: STUDENT IN AN ORGANIZED HEALTH CARE EDUCATION/TRAINING PROGRAM

## 2022-01-05 PROCEDURE — 1159F PR MEDICATION LIST DOCUMENTED IN MEDICAL RECORD: ICD-10-PCS | Mod: CPTII,S$GLB,, | Performed by: STUDENT IN AN ORGANIZED HEALTH CARE EDUCATION/TRAINING PROGRAM

## 2022-01-05 PROCEDURE — 99999 PR PBB SHADOW E&M-EST. PATIENT-LVL III: CPT | Mod: PBBFAC,,, | Performed by: STUDENT IN AN ORGANIZED HEALTH CARE EDUCATION/TRAINING PROGRAM

## 2022-01-05 PROCEDURE — 11102 TANGNTL BX SKIN SINGLE LES: CPT | Mod: S$GLB,,, | Performed by: STUDENT IN AN ORGANIZED HEALTH CARE EDUCATION/TRAINING PROGRAM

## 2022-01-05 PROCEDURE — 99203 PR OFFICE/OUTPT VISIT, NEW, LEVL III, 30-44 MIN: ICD-10-PCS | Mod: 25,S$GLB,, | Performed by: STUDENT IN AN ORGANIZED HEALTH CARE EDUCATION/TRAINING PROGRAM

## 2022-01-05 PROCEDURE — 88305 TISSUE EXAM BY PATHOLOGIST: ICD-10-PCS | Mod: 26,,, | Performed by: PATHOLOGY

## 2022-01-05 NOTE — PATIENT INSTRUCTIONS
Shave Biopsy Wound Care    Your doctor has performed a shave biopsy today.  A band aid and vaseline ointment has been placed over the site.  This should remain in place for 24 hours.  It is recommended that you keep the area dry for the first 24 hours.  After 24 hours, you may remove the band aid and wash the area with warm soap and water and apply Vaseline jelly.  Many patients prefer to use Neosporin or Bacitracin ointment.  This is acceptable; however, know that you can develop an allergy to this medication even if you have used it safely for years.  It is important to keep the area moist.  Letting it dry out and get air slows healing time, and will worsen the scar.  Band aid is optional after first 24 hours.      If you notice increasing redness, tenderness, pain, or yellow drainage at the biopsy site, please notify your doctor.  These are signs of an infection.    If your biopsy site is bleeding, apply firm pressure for 15 minutes straight.  Repeat for another 15 minutes, if it is still bleeding.   If the surgical site continues to bleed, then please contact your doctor.      [facility] [place] [address]/ [phone] FAX/ [email or web address]

## 2022-01-05 NOTE — PROGRESS NOTES
"  Subjective:       Patient ID:  Chad Chen is a 41 y.o. male who presents for   Chief Complaint   Patient presents with    Spot     Left flank     New patient    Patient here today for spot to left flank since birth. Discolored, dry, flaky. Says" Parts of it is tender and breaking off". Applying SUZANNE.        Denies Phx NMSC  Fx MM-MGF      Review of Systems   Constitutional: Negative for fever and chills.   Respiratory: Negative for cough and shortness of breath.    Gastrointestinal: Negative for nausea and vomiting.   Skin: Positive for activity-related sunscreen use (occasional ) and wears hat (occasional). Negative for daily sunscreen use.        Objective:    Physical Exam   Constitutional: He appears well-developed and well-nourished. No distress.   Neurological: He is alert and oriented to person, place, and time. He is not disoriented.   Psychiatric: He has a normal mood and affect.   Skin:   Areas Examined (abnormalities noted in diagram):   Scalp / Hair Palpated and Inspected  Head / Face Inspection Performed  Neck Inspection Performed  Chest / Axilla Inspection Performed  Abdomen Inspection Performed  Back Inspection Performed  RUE Inspected  LUE Inspection Performed  Nails and Digits Inspection Performed                   Diagram Legend     Erythematous scaling macule/papule c/w actinic keratosis       Vascular papule c/w angioma      Pigmented verrucoid papule/plaque c/w seborrheic keratosis      Yellow umbilicated papule c/w sebaceous hyperplasia      Irregularly shaped tan macule c/w lentigo     1-2 mm smooth white papules consistent with Milia      Movable subcutaneous cyst with punctum c/w epidermal inclusion cyst      Subcutaneous movable cyst c/w pilar cyst      Firm pink to brown papule c/w dermatofibroma      Pedunculated fleshy papule(s) c/w skin tag(s)      Evenly pigmented macule c/w junctional nevus     Mildly variegated pigmented, slightly irregular-bordered macule c/w mildly atypical nevus "      Flesh colored to evenly pigmented papule c/w intradermal nevus       Pink pearly papule/plaque c/w basal cell carcinoma      Erythematous hyperkeratotic cursted plaque c/w SCC      Surgical scar with no sign of skin cancer recurrence      Open and closed comedones      Inflammatory papules and pustules      Verrucoid papule consistent consistent with wart     Erythematous eczematous patches and plaques     Dystrophic onycholytic nail with subungual debris c/w onychomycosis     Umbilicated papule    Erythematous-base heme-crusted tan verrucoid plaque consistent with inflamed seborrheic keratosis     Erythematous Silvery Scaling Plaque c/w Psoriasis     See annotation          Assessment / Plan:      Pathology Orders:     Normal Orders This Visit    Specimen to Pathology, Dermatology     Comments:    Number of Specimens:->1  ------------------------->-------------------------  Spec 1 Procedure:->Biopsy  Spec 1 Clinical Impression:->flesh colored papules in linear  array w/ erythema and crusting at lateral aspect  Spec 1 Source:->left lateral lower chest    Questions:    Procedure Type: Dermatology and skin neoplasms    Number of Specimens: 1    ------------------------: -------------------------    Spec 1 Procedure: Biopsy    Spec 1 Clinical Impression: flesh colored papules in linear array w/ erythema and crusting at lateral aspect    Spec 1 Source: left lateral lower chest    Release to patient:         Neoplasm of uncertain behavior of skin  -     Specimen to Pathology, Dermatology  Shave biopsy procedure note:    Shave biopsy performed after verbal consent including risk of infection, scar, recurrence, need for additional treatment of site. Area prepped with alcohol, anesthetized with approximately 1.0cc of 1% lidocaine with epinephrine. Lesional tissue shaved with razor blade. Hemostasis achieved with application of aluminum chloride followed by hyfrecation. No complications. Dressing applied. Wound care  explained.      Dermatofibroma  This is a benign scar-like lesion secondary to minor trauma. No treatment required.     Benign neoplasm of skin of abdomen  - left lower chest/upper abdomen  - present since childhood- consistent with epidermal nevus, one irritated area today will be removed  - no concerning features    Multiple benign nevi  Careful dermoscopy evaluation of nevi performed with none identified as needing biopsy today  Monitor for new mole or moles that are becoming bigger, darker, irritated, or developing irregular borders.   Upper body skin examination performed today including at least 9 points as noted in physical examination. No lesions suspicious for malignancy noted.  Patient instructed in importance in daily broad spectrum sun protection of at least spf 30. Mineral sunscreen ingredients preferred (Zinc +/- Titanium) and can be found OTC.   Patient encouraged to wear hat for all outdoor exposure.   Also discussed sun avoidance and use of protective clothing.             prn  No follow-ups on file.

## 2022-01-12 LAB
FINAL PATHOLOGIC DIAGNOSIS: NORMAL
GROSS: NORMAL
Lab: NORMAL
MICROSCOPIC EXAM: NORMAL

## 2022-01-12 NOTE — PROGRESS NOTES
Final Pathologic Diagnosis Skin, left lateral lower chest, shave biopsy:   -SEBORRHEIC KERATOSIS, INFLAMED   This lesion is benign.   Comment: Interp By Carlos Hdez M.D., Signed on 01/12/2022 at 09:39    This is a benign spot, part of his epidermal nevus is inflamed.

## 2022-01-24 ENCOUNTER — LAB VISIT (OUTPATIENT)
Dept: LAB | Facility: HOSPITAL | Age: 42
End: 2022-01-24
Attending: INTERNAL MEDICINE
Payer: COMMERCIAL

## 2022-01-24 DIAGNOSIS — I10 ESSENTIAL HYPERTENSION: ICD-10-CM

## 2022-01-24 DIAGNOSIS — E78.5 DYSLIPIDEMIA: ICD-10-CM

## 2022-01-24 DIAGNOSIS — Z00.00 PREVENTATIVE HEALTH CARE: ICD-10-CM

## 2022-01-24 DIAGNOSIS — E55.9 VITAMIN D DEFICIENCY: ICD-10-CM

## 2022-01-24 LAB
ALBUMIN SERPL BCP-MCNC: 3.5 G/DL (ref 3.5–5.2)
ALP SERPL-CCNC: 83 U/L (ref 55–135)
ALT SERPL W/O P-5'-P-CCNC: 29 U/L (ref 10–44)
ANION GAP SERPL CALC-SCNC: 11 MMOL/L (ref 8–16)
AST SERPL-CCNC: 19 U/L (ref 10–40)
BASOPHILS # BLD AUTO: 0.02 K/UL (ref 0–0.2)
BASOPHILS NFR BLD: 0.3 % (ref 0–1.9)
BILIRUB SERPL-MCNC: 0.4 MG/DL (ref 0.1–1)
BUN SERPL-MCNC: 13 MG/DL (ref 6–20)
CALCIUM SERPL-MCNC: 8.8 MG/DL (ref 8.7–10.5)
CHLORIDE SERPL-SCNC: 108 MMOL/L (ref 95–110)
CHOLEST SERPL-MCNC: 153 MG/DL (ref 120–199)
CHOLEST/HDLC SERPL: 4.8 {RATIO} (ref 2–5)
CO2 SERPL-SCNC: 22 MMOL/L (ref 23–29)
CREAT SERPL-MCNC: 0.9 MG/DL (ref 0.5–1.4)
DIFFERENTIAL METHOD: ABNORMAL
EOSINOPHIL # BLD AUTO: 0.2 K/UL (ref 0–0.5)
EOSINOPHIL NFR BLD: 2.8 % (ref 0–8)
ERYTHROCYTE [DISTWIDTH] IN BLOOD BY AUTOMATED COUNT: 13.2 % (ref 11.5–14.5)
EST. GFR  (AFRICAN AMERICAN): >60 ML/MIN/1.73 M^2
EST. GFR  (NON AFRICAN AMERICAN): >60 ML/MIN/1.73 M^2
GLUCOSE SERPL-MCNC: 95 MG/DL (ref 70–110)
HCT VFR BLD AUTO: 39.4 % (ref 40–54)
HDLC SERPL-MCNC: 32 MG/DL (ref 40–75)
HDLC SERPL: 20.9 % (ref 20–50)
HGB BLD-MCNC: 13 G/DL (ref 14–18)
IMM GRANULOCYTES # BLD AUTO: 0.02 K/UL (ref 0–0.04)
IMM GRANULOCYTES NFR BLD AUTO: 0.3 % (ref 0–0.5)
LDLC SERPL CALC-MCNC: 104.4 MG/DL (ref 63–159)
LYMPHOCYTES # BLD AUTO: 2.1 K/UL (ref 1–4.8)
LYMPHOCYTES NFR BLD: 30.1 % (ref 18–48)
MCH RBC QN AUTO: 28.4 PG (ref 27–31)
MCHC RBC AUTO-ENTMCNC: 33 G/DL (ref 32–36)
MCV RBC AUTO: 86 FL (ref 82–98)
MONOCYTES # BLD AUTO: 0.6 K/UL (ref 0.3–1)
MONOCYTES NFR BLD: 8.6 % (ref 4–15)
NEUTROPHILS # BLD AUTO: 4 K/UL (ref 1.8–7.7)
NEUTROPHILS NFR BLD: 57.9 % (ref 38–73)
NONHDLC SERPL-MCNC: 121 MG/DL
NRBC BLD-RTO: 0 /100 WBC
PLATELET # BLD AUTO: 276 K/UL (ref 150–450)
PMV BLD AUTO: 10.1 FL (ref 9.2–12.9)
POTASSIUM SERPL-SCNC: 3.8 MMOL/L (ref 3.5–5.1)
PROT SERPL-MCNC: 6.8 G/DL (ref 6–8.4)
RBC # BLD AUTO: 4.58 M/UL (ref 4.6–6.2)
SODIUM SERPL-SCNC: 141 MMOL/L (ref 136–145)
TRIGL SERPL-MCNC: 83 MG/DL (ref 30–150)
TSH SERPL DL<=0.005 MIU/L-ACNC: 1.76 UIU/ML (ref 0.4–4)
WBC # BLD AUTO: 6.9 K/UL (ref 3.9–12.7)

## 2022-01-24 PROCEDURE — 84443 ASSAY THYROID STIM HORMONE: CPT | Performed by: INTERNAL MEDICINE

## 2022-01-24 PROCEDURE — 80053 COMPREHEN METABOLIC PANEL: CPT | Performed by: INTERNAL MEDICINE

## 2022-01-24 PROCEDURE — 80061 LIPID PANEL: CPT | Performed by: INTERNAL MEDICINE

## 2022-01-24 PROCEDURE — 36415 COLL VENOUS BLD VENIPUNCTURE: CPT | Mod: PN | Performed by: INTERNAL MEDICINE

## 2022-01-24 PROCEDURE — 83036 HEMOGLOBIN GLYCOSYLATED A1C: CPT | Performed by: INTERNAL MEDICINE

## 2022-01-24 PROCEDURE — 85025 COMPLETE CBC W/AUTO DIFF WBC: CPT | Performed by: INTERNAL MEDICINE

## 2022-01-25 ENCOUNTER — PATIENT MESSAGE (OUTPATIENT)
Dept: FAMILY MEDICINE | Facility: CLINIC | Age: 42
End: 2022-01-25
Payer: COMMERCIAL

## 2022-01-25 LAB
ESTIMATED AVG GLUCOSE: 100 MG/DL (ref 68–131)
HBA1C MFR BLD: 5.1 % (ref 4–5.6)

## 2022-02-17 ENCOUNTER — OFFICE VISIT (OUTPATIENT)
Dept: FAMILY MEDICINE | Facility: CLINIC | Age: 42
End: 2022-02-17
Payer: COMMERCIAL

## 2022-02-17 VITALS
HEIGHT: 71 IN | SYSTOLIC BLOOD PRESSURE: 122 MMHG | HEART RATE: 50 BPM | BODY MASS INDEX: 37.24 KG/M2 | WEIGHT: 266 LBS | OXYGEN SATURATION: 98 % | DIASTOLIC BLOOD PRESSURE: 72 MMHG

## 2022-02-17 DIAGNOSIS — I10 ESSENTIAL HYPERTENSION: Primary | ICD-10-CM

## 2022-02-17 DIAGNOSIS — E66.01 CLASS 2 SEVERE OBESITY DUE TO EXCESS CALORIES WITH SERIOUS COMORBIDITY AND BODY MASS INDEX (BMI) OF 39.0 TO 39.9 IN ADULT: ICD-10-CM

## 2022-02-17 DIAGNOSIS — E78.5 DYSLIPIDEMIA: ICD-10-CM

## 2022-02-17 PROCEDURE — 1159F MED LIST DOCD IN RCRD: CPT | Mod: CPTII,S$GLB,, | Performed by: INTERNAL MEDICINE

## 2022-02-17 PROCEDURE — 1159F PR MEDICATION LIST DOCUMENTED IN MEDICAL RECORD: ICD-10-PCS | Mod: CPTII,S$GLB,, | Performed by: INTERNAL MEDICINE

## 2022-02-17 PROCEDURE — 3008F BODY MASS INDEX DOCD: CPT | Mod: CPTII,S$GLB,, | Performed by: INTERNAL MEDICINE

## 2022-02-17 PROCEDURE — 99214 OFFICE O/P EST MOD 30 MIN: CPT | Mod: S$GLB,,, | Performed by: INTERNAL MEDICINE

## 2022-02-17 PROCEDURE — 99999 PR PBB SHADOW E&M-EST. PATIENT-LVL III: CPT | Mod: PBBFAC,,, | Performed by: INTERNAL MEDICINE

## 2022-02-17 PROCEDURE — 3074F PR MOST RECENT SYSTOLIC BLOOD PRESSURE < 130 MM HG: ICD-10-PCS | Mod: CPTII,S$GLB,, | Performed by: INTERNAL MEDICINE

## 2022-02-17 PROCEDURE — 3044F HG A1C LEVEL LT 7.0%: CPT | Mod: CPTII,S$GLB,, | Performed by: INTERNAL MEDICINE

## 2022-02-17 PROCEDURE — 1160F RVW MEDS BY RX/DR IN RCRD: CPT | Mod: CPTII,S$GLB,, | Performed by: INTERNAL MEDICINE

## 2022-02-17 PROCEDURE — 99999 PR PBB SHADOW E&M-EST. PATIENT-LVL III: ICD-10-PCS | Mod: PBBFAC,,, | Performed by: INTERNAL MEDICINE

## 2022-02-17 PROCEDURE — 3078F DIAST BP <80 MM HG: CPT | Mod: CPTII,S$GLB,, | Performed by: INTERNAL MEDICINE

## 2022-02-17 PROCEDURE — 3008F PR BODY MASS INDEX (BMI) DOCUMENTED: ICD-10-PCS | Mod: CPTII,S$GLB,, | Performed by: INTERNAL MEDICINE

## 2022-02-17 PROCEDURE — 99214 PR OFFICE/OUTPT VISIT, EST, LEVL IV, 30-39 MIN: ICD-10-PCS | Mod: S$GLB,,, | Performed by: INTERNAL MEDICINE

## 2022-02-17 PROCEDURE — 3074F SYST BP LT 130 MM HG: CPT | Mod: CPTII,S$GLB,, | Performed by: INTERNAL MEDICINE

## 2022-02-17 PROCEDURE — 3044F PR MOST RECENT HEMOGLOBIN A1C LEVEL <7.0%: ICD-10-PCS | Mod: CPTII,S$GLB,, | Performed by: INTERNAL MEDICINE

## 2022-02-17 PROCEDURE — 1160F PR REVIEW ALL MEDS BY PRESCRIBER/CLIN PHARMACIST DOCUMENTED: ICD-10-PCS | Mod: CPTII,S$GLB,, | Performed by: INTERNAL MEDICINE

## 2022-02-17 PROCEDURE — 3078F PR MOST RECENT DIASTOLIC BLOOD PRESSURE < 80 MM HG: ICD-10-PCS | Mod: CPTII,S$GLB,, | Performed by: INTERNAL MEDICINE

## 2022-02-17 NOTE — PROGRESS NOTES
Assessment and Plan:    1. Essential hypertension  Controlled, continue current medications.     2. Dyslipidemia  Significantly improved with diet changes, continue good work.     3. Class 2 severe obesity due to excess calories with serious comorbidity and body mass index (BMI) of 39.0 to 39.9 in adult  Down 15 lbs! Will continue with current lifestyle changes.    ______________________________________________________________________  Subjective:    Chief Complaint:  Follow up chronic medical conditions.    HPI:  Chad is a 42 y.o. year old male here to follow up chronic medical conditions.     He is using medical marijuana edibles at night to help with sleep. With better sleep he is able to get more exercise the following day. He has lost 16 lbs. He is eating better. With weight loss and medical marijuana his carpal tunnel syndrome is much better.      HTN- Still taking amlodipine 5 mg daily and valsartan 320 mg daily. Has been generally controlled.     Dyslipidemia- Improved with diet changes as above.      Medications:  Current Outpatient Medications on File Prior to Visit   Medication Sig Dispense Refill    amLODIPine (NORVASC) 5 MG tablet Take 1 tablet (5 mg total) by mouth once daily. 90 tablet 1    om 3/E/linol/ala/oleic/gla/lip (OMEGA 3-6-9 ORAL) Take 1 capsule by mouth once daily.      potassium chloride (KLOR-CON) 10 MEQ TbSR Take 1 tablet (10 mEq total) by mouth 2 (two) times daily. 180 tablet 1    valsartan (DIOVAN) 320 MG tablet Take 1 tablet (320 mg total) by mouth once daily. 90 tablet 1    PROAIR RESPICLICK 90 mcg/actuation inhaler Inhale 2 puffs into the lungs every 6 (six) hours as needed for Wheezing or Shortness of Breath. (Patient not taking: Reported on 2/17/2022) 1 each 1    sumatriptan (IMITREX) 50 MG tablet Take 1 tablet (50 mg total) by mouth daily as needed for Migraine. 9 tablet 1     No current facility-administered medications on file prior to visit.       Review of  "Systems:  Review of Systems   Constitutional: Negative for chills and fever.   Respiratory: Negative for shortness of breath and wheezing.    Cardiovascular: Negative for chest pain and leg swelling.   Gastrointestinal: Negative for diarrhea, nausea and vomiting.   Neurological: Negative for dizziness, syncope and light-headedness.       Past Medical History:  Past Medical History:   Diagnosis Date    Asthma     exercise induced    Cardiac murmur     Dyslipidemia     Hypertension     Obesity     PONV (postoperative nausea and vomiting)     Seasonal and perennial allergic rhinitis        Objective:    Vitals:  Vitals:    02/17/22 0941   BP: 122/72   Pulse: (!) 50   SpO2: 98%   Weight: 120.7 kg (265 lb 15.8 oz)   Height: 5' 11" (1.803 m)   PainSc: 0-No pain       Physical Exam  Vitals reviewed.   Constitutional:       General: He is not in acute distress.     Appearance: He is well-developed and well-nourished.   HENT:      Mouth/Throat:      Mouth: Oropharynx is clear and moist.   Eyes:      Conjunctiva/sclera: Conjunctivae normal.   Cardiovascular:      Rate and Rhythm: Normal rate and regular rhythm.   Pulmonary:      Effort: Pulmonary effort is normal. No respiratory distress.   Skin:     General: Skin is warm and dry.   Neurological:      Mental Status: He is alert and oriented to person, place, and time.   Psychiatric:         Mood and Affect: Mood and affect normal.         Behavior: Behavior normal.         Data:  Mild normocytic anemia  TGs improved    Bronwyn Brandt MD  Internal Medicine    "

## 2022-03-14 ENCOUNTER — OFFICE VISIT (OUTPATIENT)
Dept: ORTHOPEDICS | Facility: CLINIC | Age: 42
End: 2022-03-14
Payer: COMMERCIAL

## 2022-03-14 VITALS — HEIGHT: 71 IN | WEIGHT: 265 LBS | BODY MASS INDEX: 37.1 KG/M2

## 2022-03-14 DIAGNOSIS — M65.331 TRIGGER FINGER, RIGHT MIDDLE FINGER: Primary | ICD-10-CM

## 2022-03-14 PROCEDURE — 1159F MED LIST DOCD IN RCRD: CPT | Mod: CPTII,S$GLB,, | Performed by: ORTHOPAEDIC SURGERY

## 2022-03-14 PROCEDURE — 1159F PR MEDICATION LIST DOCUMENTED IN MEDICAL RECORD: ICD-10-PCS | Mod: CPTII,S$GLB,, | Performed by: ORTHOPAEDIC SURGERY

## 2022-03-14 PROCEDURE — 1160F RVW MEDS BY RX/DR IN RCRD: CPT | Mod: CPTII,S$GLB,, | Performed by: ORTHOPAEDIC SURGERY

## 2022-03-14 PROCEDURE — 99999 PR PBB SHADOW E&M-EST. PATIENT-LVL III: ICD-10-PCS | Mod: PBBFAC,,, | Performed by: ORTHOPAEDIC SURGERY

## 2022-03-14 PROCEDURE — 3008F BODY MASS INDEX DOCD: CPT | Mod: CPTII,S$GLB,, | Performed by: ORTHOPAEDIC SURGERY

## 2022-03-14 PROCEDURE — 99214 OFFICE O/P EST MOD 30 MIN: CPT | Mod: S$GLB,,, | Performed by: ORTHOPAEDIC SURGERY

## 2022-03-14 PROCEDURE — 99214 PR OFFICE/OUTPT VISIT, EST, LEVL IV, 30-39 MIN: ICD-10-PCS | Mod: S$GLB,,, | Performed by: ORTHOPAEDIC SURGERY

## 2022-03-14 PROCEDURE — 3044F HG A1C LEVEL LT 7.0%: CPT | Mod: CPTII,S$GLB,, | Performed by: ORTHOPAEDIC SURGERY

## 2022-03-14 PROCEDURE — 99999 PR PBB SHADOW E&M-EST. PATIENT-LVL III: CPT | Mod: PBBFAC,,, | Performed by: ORTHOPAEDIC SURGERY

## 2022-03-14 PROCEDURE — 1160F PR REVIEW ALL MEDS BY PRESCRIBER/CLIN PHARMACIST DOCUMENTED: ICD-10-PCS | Mod: CPTII,S$GLB,, | Performed by: ORTHOPAEDIC SURGERY

## 2022-03-14 PROCEDURE — 3044F PR MOST RECENT HEMOGLOBIN A1C LEVEL <7.0%: ICD-10-PCS | Mod: CPTII,S$GLB,, | Performed by: ORTHOPAEDIC SURGERY

## 2022-03-14 PROCEDURE — 3008F PR BODY MASS INDEX (BMI) DOCUMENTED: ICD-10-PCS | Mod: CPTII,S$GLB,, | Performed by: ORTHOPAEDIC SURGERY

## 2022-03-14 NOTE — PROGRESS NOTES
3/14/2022    Chief Complaint:  Chief Complaint   Patient presents with    Right Hand - Pain     Right middle finger triggering        HPI:  Chad Chen is a 42 y.o. male, who presents to clinic today he has a history of right middle finger triggering.  He has been injected in the past.  He continues to have recurrence of the trigger.  He is here today to discuss further treatment options.    PMHX:  Past Medical History:   Diagnosis Date    Asthma     exercise induced    Cardiac murmur     Dyslipidemia     Hypertension     Obesity     PONV (postoperative nausea and vomiting)     Seasonal and perennial allergic rhinitis        PSHX:  Past Surgical History:   Procedure Laterality Date    ADENOIDECTOMY  2004    CARPAL TUNNEL RELEASE Left 2020    Procedure: RELEASE, CARPAL TUNNEL;  Surgeon: Dre Montanez MD;  Location[facility];  Service: Orthopedics;  Laterality: Left;  Procedure:  Left carpal tunnel release    Position:  Supine    Anesthesia:  General equipment:  Carpal tunnel Set    CHOLECYSTECTOMY      laparoscopic removal gallstones    TONSILLECTOMY  2004    corrective septoplasty same time.       FMHX:  Family History   Problem Relation Age of Onset    Cancer Mother         triple breast cancer at 2 different times.     Heart disease Father         2V CABG at 45; 12 coronary stents.    Hyperlipidemia Father     Mental illness Father         MIKALA    Stroke Father         traumatic CVA    Vision loss Father         very poor vision    Diabetes Maternal Uncle     Hypertension Paternal Uncle     Cancer Maternal Grandfather          from melanoma at his 60's.    Early death Maternal Grandfather          early 60's of melanoma    Melanoma Maternal Grandfather     Diabetes Paternal Grandmother     Hypertension Paternal Grandfather        SOCHX:  Social History     Tobacco Use    Smoking status: Never Smoker    Smokeless tobacco: Never Used   Substance Use Topics    Alcohol  "use: Yes     Alcohol/week: 4.0 standard drinks     Types: 4 Cans of beer per week     Comment: occ       ALLERGIES:  Inderal [propranolol] and Lisinopril    CURRENT MEDICATIONS:  Current Outpatient Medications on File Prior to Visit   Medication Sig Dispense Refill    amLODIPine (NORVASC) 5 MG tablet Take 1 tablet (5 mg total) by mouth once daily. 90 tablet 1    om 3/E/linol/ala/oleic/gla/lip (OMEGA 3-6-9 ORAL) Take 1 capsule by mouth once daily.      potassium chloride (KLOR-CON) 10 MEQ TbSR Take 1 tablet (10 mEq total) by mouth 2 (two) times daily. 180 tablet 1    PROAIR RESPICLICK 90 mcg/actuation inhaler Inhale 2 puffs into the lungs every 6 (six) hours as needed for Wheezing or Shortness of Breath. 1 each 1    valsartan (DIOVAN) 320 MG tablet Take 1 tablet (320 mg total) by mouth once daily. 90 tablet 1    sumatriptan (IMITREX) 50 MG tablet Take 1 tablet (50 mg total) by mouth daily as needed for Migraine. 9 tablet 1     No current facility-administered medications on file prior to visit.       REVIEW OF SYSTEMS:  ROS    GENERAL PHYSICAL EXAM:   Ht 5' 11" (1.803 m)   Wt 120.2 kg (265 lb)   BMI 36.96 kg/m²    GEN: well developed, well nourished, no acute distress   HENT: Normocephalic, atraumatic   EYES: No discharge, conjunctiva normal   NECK: Supple, non-tender   PULM: No wheezing, no respiratory distress   CV: RRR   ABD: Soft, non-tender    ORTHO EXAM:   Examination the right hand middle finger reveals that there is no edema.  There are no skin changes.  Palpation does produce tenderness over the A1 pulley of the middle finger.  Flexion and extension reveals painful active triggering.  He does report grossly intact sensation in the median radial ulnar distribution.  Capillary refill is less than 2 seconds in all the digits.    RADIOLOGY:   None    ASSESSMENT:   Right middle finger triggering    PLAN:  1. I did discuss the risks and benefits of trigger finger release.  After discussion of the risks and " benefits informed consent has been obtained    2. Will proceed with right middle finger trigger release under local anesthesia    3. He will follow up with me 2 weeks postoperatively

## 2022-03-14 NOTE — H&P (VIEW-ONLY)
3/14/2022    Chief Complaint:  Chief Complaint   Patient presents with    Right Hand - Pain     Right middle finger triggering        HPI:  Chad Chen is a 42 y.o. male, who presents to clinic today he has a history of right middle finger triggering.  He has been injected in the past.  He continues to have recurrence of the trigger.  He is here today to discuss further treatment options.    PMHX:  Past Medical History:   Diagnosis Date    Asthma     exercise induced    Cardiac murmur     Dyslipidemia     Hypertension     Obesity     PONV (postoperative nausea and vomiting)     Seasonal and perennial allergic rhinitis        PSHX:  Past Surgical History:   Procedure Laterality Date    ADENOIDECTOMY  2004    CARPAL TUNNEL RELEASE Left 2020    Procedure: RELEASE, CARPAL TUNNEL;  Surgeon: Dre Montanez MD;  Location: [facility];  Service: Orthopedics;  Laterality: Left;  Procedure:  Left carpal tunnel release    Position:  Supine    Anesthesia:  General equipment:  Carpal tunnel Set    CHOLECYSTECTOMY      laparoscopic removal gallstones    TONSILLECTOMY  2004    corrective septoplasty same time.       FMHX:  Family History   Problem Relation Age of Onset    Cancer Mother         triple breast cancer at 2 different times.     Heart disease Father         2V CABG at 45; 12 coronary stents.    Hyperlipidemia Father     Mental illness Father         MIKALA    Stroke Father         traumatic CVA    Vision loss Father         very poor vision    Diabetes Maternal Uncle     Hypertension Paternal Uncle     Cancer Maternal Grandfather          from melanoma at his 60's.    Early death Maternal Grandfather          early 60's of melanoma    Melanoma Maternal Grandfather     Diabetes Paternal Grandmother     Hypertension Paternal Grandfather        SOCHX:  Social History     Tobacco Use    Smoking status: Never Smoker    Smokeless tobacco: Never Used   Substance Use Topics    Alcohol  "use: Yes     Alcohol/week: 4.0 standard drinks     Types: 4 Cans of beer per week     Comment: occ       ALLERGIES:  Inderal [propranolol] and Lisinopril    CURRENT MEDICATIONS:  Current Outpatient Medications on File Prior to Visit   Medication Sig Dispense Refill    amLODIPine (NORVASC) 5 MG tablet Take 1 tablet (5 mg total) by mouth once daily. 90 tablet 1    om 3/E/linol/ala/oleic/gla/lip (OMEGA 3-6-9 ORAL) Take 1 capsule by mouth once daily.      potassium chloride (KLOR-CON) 10 MEQ TbSR Take 1 tablet (10 mEq total) by mouth 2 (two) times daily. 180 tablet 1    PROAIR RESPICLICK 90 mcg/actuation inhaler Inhale 2 puffs into the lungs every 6 (six) hours as needed for Wheezing or Shortness of Breath. 1 each 1    valsartan (DIOVAN) 320 MG tablet Take 1 tablet (320 mg total) by mouth once daily. 90 tablet 1    sumatriptan (IMITREX) 50 MG tablet Take 1 tablet (50 mg total) by mouth daily as needed for Migraine. 9 tablet 1     No current facility-administered medications on file prior to visit.       REVIEW OF SYSTEMS:  ROS    GENERAL PHYSICAL EXAM:   Ht 5' 11" (1.803 m)   Wt 120.2 kg (265 lb)   BMI 36.96 kg/m²    GEN: well developed, well nourished, no acute distress   HENT: Normocephalic, atraumatic   EYES: No discharge, conjunctiva normal   NECK: Supple, non-tender   PULM: No wheezing, no respiratory distress   CV: RRR   ABD: Soft, non-tender    ORTHO EXAM:   Examination the right hand middle finger reveals that there is no edema.  There are no skin changes.  Palpation does produce tenderness over the A1 pulley of the middle finger.  Flexion and extension reveals painful active triggering.  He does report grossly intact sensation in the median radial ulnar distribution.  Capillary refill is less than 2 seconds in all the digits.    RADIOLOGY:   None    ASSESSMENT:   Right middle finger triggering    PLAN:  1. I did discuss the risks and benefits of trigger finger release.  After discussion of the risks and " benefits informed consent has been obtained    2. Will proceed with right middle finger trigger release under local anesthesia    3. He will follow up with me 2 weeks postoperatively

## 2022-03-14 NOTE — PATIENT INSTRUCTIONS
Surgery Instructions:     Your surgery is scheduled on 03/31/22 at the surgery center: [address], 1st floor, second entrance.    The pre-op department will be in contact with you prior to your procedure to review medications and instructions.       Nothing to eat or drink after midnight prior to day of surgery.    You should STOP taking any blood thinners 5 days prior to surgery.     Please obtain medical and cardiac clearance as advised prior to surgery. All preoperative testing should be done as soon as possible as it may be needed to obtain clearance.    Your pre op COVID-19 test collection is scheduled for not needed fully vaccinated.  Please arrive at the drive thru at entrance 3 for this test collection.  You will not need to get out of your car.    The surgery center will contact you the day prior to surgery to advise you of your arrival time for surgery.     Your post op appointment is scheduled on 04/13/22 @ 8:00am.    You will be contacted by an automated text message every morning for for 14 days after your surgery inquiring if you have any COVID symptoms.  If you have any concerns regarding COVID please reply to the text message and then an [facility] On Call Registered Nurse will contact you later that day.

## 2022-03-15 ENCOUNTER — PATIENT MESSAGE (OUTPATIENT)
Dept: ADMINISTRATIVE | Facility: OTHER | Age: 42
End: 2022-03-15
Payer: COMMERCIAL

## 2022-03-15 DIAGNOSIS — M65.331 TRIGGER FINGER, RIGHT MIDDLE FINGER: Primary | ICD-10-CM

## 2022-03-31 ENCOUNTER — HOSPITAL ENCOUNTER (OUTPATIENT)
Facility: HOSPITAL | Age: 42
Discharge: HOME | End: 2022-03-31
Attending: ORTHOPAEDIC SURGERY | Admitting: ORTHOPAEDIC SURGERY
Payer: COMMERCIAL

## 2022-03-31 DIAGNOSIS — M65.331 TRIGGER FINGER, RIGHT MIDDLE FINGER: ICD-10-CM

## 2022-03-31 PROCEDURE — 25000003 PHARM REV CODE 250: Mod: PO | Performed by: ORTHOPAEDIC SURGERY

## 2022-03-31 PROCEDURE — 36000707: Mod: PO | Performed by: ORTHOPAEDIC SURGERY

## 2022-03-31 PROCEDURE — 63600175 PHARM REV CODE 636 W HCPCS: Mod: PO | Performed by: PHYSICIAN ASSISTANT

## 2022-03-31 PROCEDURE — 71000015 HC POSTOP RECOV 1ST HR: Mod: PO | Performed by: ORTHOPAEDIC SURGERY

## 2022-03-31 PROCEDURE — 25000003 PHARM REV CODE 250: Mod: PO | Performed by: PHYSICIAN ASSISTANT

## 2022-03-31 PROCEDURE — 36000706: Mod: PO | Performed by: ORTHOPAEDIC SURGERY

## 2022-03-31 PROCEDURE — 63600175 PHARM REV CODE 636 W HCPCS: Mod: PO | Performed by: ORTHOPAEDIC SURGERY

## 2022-03-31 PROCEDURE — 26055 PR INCISE FINGER TENDON SHEATH: ICD-10-PCS | Mod: F7,,, | Performed by: ORTHOPAEDIC SURGERY

## 2022-03-31 PROCEDURE — 26055 INCISE FINGER TENDON SHEATH: CPT | Mod: F7,,, | Performed by: ORTHOPAEDIC SURGERY

## 2022-03-31 RX ORDER — SODIUM CHLORIDE, SODIUM LACTATE, POTASSIUM CHLORIDE, CALCIUM CHLORIDE 600; 310; 30; 20 MG/100ML; MG/100ML; MG/100ML; MG/100ML
INJECTION, SOLUTION INTRAVENOUS CONTINUOUS
Status: DISCONTINUED | OUTPATIENT
Start: 2022-03-31 | End: 2022-03-31 | Stop reason: HOSPADM

## 2022-03-31 RX ORDER — LEVOCETIRIZINE DIHYDROCHLORIDE 5 MG/1
5 TABLET, FILM COATED ORAL NIGHTLY
COMMUNITY

## 2022-03-31 RX ORDER — BUPIVACAINE HYDROCHLORIDE 2.5 MG/ML
INJECTION, SOLUTION EPIDURAL; INFILTRATION; INTRACAUDAL
Status: DISCONTINUED | OUTPATIENT
Start: 2022-03-31 | End: 2022-03-31 | Stop reason: HOSPADM

## 2022-03-31 RX ORDER — LIDOCAINE HYDROCHLORIDE 10 MG/ML
INJECTION, SOLUTION EPIDURAL; INFILTRATION; INTRACAUDAL; PERINEURAL
Status: DISCONTINUED | OUTPATIENT
Start: 2022-03-31 | End: 2022-03-31 | Stop reason: HOSPADM

## 2022-03-31 RX ORDER — IBUPROFEN 800 MG/1
800 TABLET ORAL EVERY 6 HOURS PRN
Qty: 8 TABLET | Refills: 0 | Status: SHIPPED | OUTPATIENT
Start: 2022-03-31 | End: 2022-08-19

## 2022-03-31 RX ADMIN — SODIUM CHLORIDE, SODIUM LACTATE, POTASSIUM CHLORIDE, AND CALCIUM CHLORIDE: .6; .31; .03; .02 INJECTION, SOLUTION INTRAVENOUS at 08:03

## 2022-03-31 NOTE — DISCHARGE SUMMARY
[facility] - Surgery  Discharge Note  Short Stay    Procedure(s) (LRB):  Right middle finger trigger release (Right)    OUTCOME: Patient tolerated treatment/procedure well without complication and is now ready for discharge.    DISPOSITION: Home or Self Care    FINAL DIAGNOSIS:  Trigger finger, right middle finger    FOLLOWUP: In clinic    DISCHARGE INSTRUCTIONS:    Discharge Procedure Orders   Diet general     Activity as tolerated     Keep surgical extremity elevated     Lifting restrictions   Order Comments: Please limit lifting with the right arm and hand to 2-3 lb     Call MD for:  temperature >100.4     Call MD for:  persistent nausea and vomiting     Call MD for:  severe uncontrolled pain     Call MD for:  difficulty breathing, headache or visual disturbances     Call MD for:  redness, tenderness, or signs of infection (pain, swelling, redness, odor or green/yellow discharge around incision site)     Call MD for:  hives     Call MD for:  persistent dizziness or light-headedness     Call MD for:  extreme fatigue     Wound care routine (specify)   Order Comments: Wound care routine:  Dressing to the right arm and hand can be taken off after 1 week.  At that point you can begin washing her hand in running water.  Please continue to cover the wound with a Band-Aid or soft dressing after the operative dressing has been removed.        TIME SPENT ON DISCHARGE: 15 minutes

## 2022-03-31 NOTE — OP NOTE
Certification of Assistant at Surgery       Surgery Date: 3/31/2022     Participating Surgeons:  Surgeon(s) and Role:     * Dre Montanez MD - Primary    Procedures:  Procedure(s) (LRB):  Right middle finger trigger release (Right)    Assistant Surgeon's Certification of Necessity:  I understand that section 1842 (b) (6) (d) of the Social Security Act generally prohibits Medicare Part B reasonable charge payment for the services of assistants at surgery in teaching hospitals when qualified residents are available to furnish such services. I certify that the services for which payment is claimed were medically necessary, and that no qualified resident was available to perform the services. I further understand that these services are subject to post-payment review by the Medicare carrier.      Keo Powers PA-C    03/31/2022  11:57 AM

## 2022-03-31 NOTE — OP NOTE
Chad Chen  1980    DATE OF SURGERY: 3/31/2022     PRE-OPERATIVE DIAGNOSIS:  Right middle trigger finger    POST-OPERATIVE DIAGNOSIS:  Right middle trigger finger     ANESTHESIA TYPE: Local    BLOOD LOSS: Less than 10cc    TOURNIQUET TIME: None    SURGEON: Dr. Montanez    ASSISTANT:  Keo Powers    PROCEDURE:  Right middle finger A1 Pulley Release    IMPLANTS: None    SPECIMENS: None    INDICATION:     Mr. Chen presented to my clinic with a history of right middle finger triggering. Conservative treatments were initially tried including steroid injections. The patient did have relief with attempts at conservative treatment however the triggering has reccured. A discussion of the risks and benefits of A1 Pulley release has been performed, and informed consent for the procedure has been obtained.    PROCEDURE IN DETAIL:     Mr. Chen was transported to the operating room and was placed supine on the operating room table. All appropriate points were padded. The right hand and arm was prepped and draped in the normal sterile fashion. Time out was called. The correct patient, correct operative site, correct procedure, antibiotic administration which consisted of 2 g of Ancef, and allergies to medications which are to Inderal [propranolol] and Lisinopril  were reviewed. Time in was then called.     Attention was turned to right hand where a 1 cm incision was made in the palm of the hand directly over the A1 pulley of the right middle finger. This was carried through the skin. Subcutaneous tissues were dissected with tenotomy scissors. The flexor tendon and flexor tendon sheath was identified.  The A1 pulley was then identified as well.  Under direct visualization the A1 pulley was released with tenotomy scissors.  A complete release of the pulley was performed.  The finger was taken through an active and passive range of motion and no further triggering was noted.  The flexor tendons were then visualized and there  was noted to be no significant pathology about the tendon or the remaining tendon sheath.  The wound was then irrigated. All visible vessels were cauterized with bipolar cautery and hemostasis was obtained.  The wound was then closed with 4-0 nylon superficially. Local anesthetic was provided using 0.25% Marcaine without epinephrine. The wound was dressed with Xeroform, gauze, cast padding and an Ace wrap.        Patient was noted to be stable throughout the case and tolerated this procedure well.. All lap, needle, sponge, and equipment counts were correct at the end of the case.    POST-OPERATIVE PLAN:     Mr. Chen will keep a soft dressing in place for 1 week at which time the dressing can be taken off the patient can begin washing the hand under running water.  The sutures and the wound must continue to be covered after the operative dressing has been taken off with either a Band-Aid or soft dressing.  Light activity with lifting of up to 3 lb can begin today.  Patient will follow-up with me in 2 weeks for suture removal and a wound check.

## 2022-03-31 NOTE — PATIENT INSTRUCTIONS
Procedure:  Trigger finger release    1. Please keep the dressing clean, dry, and in place. Do not take it off and do not get it wet.    2.  The operative dressing can be taken off in 1 week and the patient can begin washing the right hand in running water.  Please do not soak the hand in a tub of water or perform activities such as washing dishes.  The wound can be covered with a Band-Aid after the operative dressing has been taken off to prevent suture irritation.    3. Please keep the right arm and hand elevated for the 1st 24-48 hours to prevent swelling    4. Flexion and extension of the exposed fingers is encouraged, but do not attempt to push off or lift more than 1-2 pounds with right arm or hand    5. Please limit weightbearing to the right hand to 1-2 pounds. Light activity such as brushing your teeth, using a fork, or lifting a small drink are allowed starting today.    6. Pain medication has been prescribed. Please take it as necessary    7. If there are any questions or concerns please call Dr. Montanez's office.    8.  Follow up with Dr. Montanez in 2 weeks

## 2022-04-01 VITALS
HEIGHT: 71 IN | BODY MASS INDEX: 35.7 KG/M2 | WEIGHT: 255 LBS | TEMPERATURE: 98 F | DIASTOLIC BLOOD PRESSURE: 79 MMHG | RESPIRATION RATE: 16 BRPM | SYSTOLIC BLOOD PRESSURE: 136 MMHG | HEART RATE: 67 BPM | OXYGEN SATURATION: 96 %

## 2022-04-13 ENCOUNTER — OFFICE VISIT (OUTPATIENT)
Dept: ORTHOPEDICS | Facility: CLINIC | Age: 42
End: 2022-04-13
Payer: COMMERCIAL

## 2022-04-13 VITALS — BODY MASS INDEX: 35.7 KG/M2 | HEIGHT: 71 IN | WEIGHT: 255 LBS

## 2022-04-13 DIAGNOSIS — M65.331 TRIGGER FINGER, RIGHT MIDDLE FINGER: Primary | ICD-10-CM

## 2022-04-13 PROCEDURE — 1159F PR MEDICATION LIST DOCUMENTED IN MEDICAL RECORD: ICD-10-PCS | Mod: CPTII,S$GLB,, | Performed by: ORTHOPAEDIC SURGERY

## 2022-04-13 PROCEDURE — 3008F BODY MASS INDEX DOCD: CPT | Mod: CPTII,S$GLB,, | Performed by: ORTHOPAEDIC SURGERY

## 2022-04-13 PROCEDURE — 99999 PR PBB SHADOW E&M-EST. PATIENT-LVL III: CPT | Mod: PBBFAC,,, | Performed by: ORTHOPAEDIC SURGERY

## 2022-04-13 PROCEDURE — 99999 PR PBB SHADOW E&M-EST. PATIENT-LVL III: ICD-10-PCS | Mod: PBBFAC,,, | Performed by: ORTHOPAEDIC SURGERY

## 2022-04-13 PROCEDURE — 3044F PR MOST RECENT HEMOGLOBIN A1C LEVEL <7.0%: ICD-10-PCS | Mod: CPTII,S$GLB,, | Performed by: ORTHOPAEDIC SURGERY

## 2022-04-13 PROCEDURE — 1159F MED LIST DOCD IN RCRD: CPT | Mod: CPTII,S$GLB,, | Performed by: ORTHOPAEDIC SURGERY

## 2022-04-13 PROCEDURE — 4010F ACE/ARB THERAPY RXD/TAKEN: CPT | Mod: CPTII,S$GLB,, | Performed by: ORTHOPAEDIC SURGERY

## 2022-04-13 PROCEDURE — 99024 PR POST-OP FOLLOW-UP VISIT: ICD-10-PCS | Mod: S$GLB,,, | Performed by: ORTHOPAEDIC SURGERY

## 2022-04-13 PROCEDURE — 99024 POSTOP FOLLOW-UP VISIT: CPT | Mod: S$GLB,,, | Performed by: ORTHOPAEDIC SURGERY

## 2022-04-13 PROCEDURE — 3008F PR BODY MASS INDEX (BMI) DOCUMENTED: ICD-10-PCS | Mod: CPTII,S$GLB,, | Performed by: ORTHOPAEDIC SURGERY

## 2022-04-13 PROCEDURE — 3044F HG A1C LEVEL LT 7.0%: CPT | Mod: CPTII,S$GLB,, | Performed by: ORTHOPAEDIC SURGERY

## 2022-04-13 PROCEDURE — 4010F PR ACE/ARB THEARPY RXD/TAKEN: ICD-10-PCS | Mod: CPTII,S$GLB,, | Performed by: ORTHOPAEDIC SURGERY

## 2022-04-13 NOTE — PROGRESS NOTES
Mr Chen returns to clinic today.  Has a history of right middle fingers trigger release.  He is 2 weeks postop.  He is overall doing well but has some swelling and soreness    Physical exam:  Examination the right hand middle finger reveals the incision is healing well.  There are sutures in place.  There is only mild edema noted.  There is no erythema.  He is able to flex to within 1 cm of the distal palmar crease and he can fully extend.  He does have sensation intact and capillary refill less than 2 seconds    Assessment:  Right middle finger trigger release    Plan:    1. Sutures were removed today and Steri-Strips are placed    2. He will begin aggressively working on his range of motion    3. He will follow up in 2-3 weeks for repeat evaluation.  If he has not regained full motion I will consider sending him to formal therapy

## 2022-04-14 ENCOUNTER — PATIENT MESSAGE (OUTPATIENT)
Dept: ADMINISTRATIVE | Facility: OTHER | Age: 42
End: 2022-04-14
Payer: COMMERCIAL

## 2022-04-27 DIAGNOSIS — E87.6 HYPOKALEMIA: ICD-10-CM

## 2022-04-27 DIAGNOSIS — I10 HYPERTENSION, UNSPECIFIED TYPE: ICD-10-CM

## 2022-04-27 DIAGNOSIS — G43.109 MIGRAINE WITH AURA AND WITHOUT STATUS MIGRAINOSUS, NOT INTRACTABLE: ICD-10-CM

## 2022-04-27 RX ORDER — POTASSIUM CHLORIDE 750 MG/1
10 TABLET, EXTENDED RELEASE ORAL 2 TIMES DAILY
Qty: 180 TABLET | Refills: 1 | Status: SHIPPED | OUTPATIENT
Start: 2022-04-27 | End: 2022-06-02

## 2022-04-27 RX ORDER — AMLODIPINE BESYLATE 5 MG/1
5 TABLET ORAL DAILY
Qty: 90 TABLET | Refills: 1 | Status: SHIPPED | OUTPATIENT
Start: 2022-04-27 | End: 2022-08-19 | Stop reason: SDUPTHER

## 2022-04-27 RX ORDER — VALSARTAN 320 MG/1
320 TABLET ORAL DAILY
Qty: 90 TABLET | Refills: 1 | Status: SHIPPED | OUTPATIENT
Start: 2022-04-27 | End: 2022-08-19 | Stop reason: SDUPTHER

## 2022-04-27 RX ORDER — SUMATRIPTAN 50 MG/1
50 TABLET, FILM COATED ORAL DAILY PRN
Qty: 9 TABLET | Refills: 1 | Status: SHIPPED | OUTPATIENT
Start: 2022-04-27 | End: 2023-09-27 | Stop reason: SDUPTHER

## 2022-04-27 NOTE — TELEPHONE ENCOUNTER
No new care gaps identified.  Powered by [employer]. Reference number: [id number].   4/27/2022 9:07:33 AM CDT

## 2022-05-04 ENCOUNTER — OFFICE VISIT (OUTPATIENT)
Dept: ORTHOPEDICS | Facility: CLINIC | Age: 42
End: 2022-05-04
Payer: COMMERCIAL

## 2022-05-04 VITALS — BODY MASS INDEX: 35.7 KG/M2 | WEIGHT: 255 LBS | HEIGHT: 71 IN

## 2022-05-04 DIAGNOSIS — Z98.890 STATUS POST TRIGGER FINGER RELEASE: Primary | ICD-10-CM

## 2022-05-04 PROCEDURE — 1159F MED LIST DOCD IN RCRD: CPT | Mod: CPTII,S$GLB,, | Performed by: PHYSICIAN ASSISTANT

## 2022-05-04 PROCEDURE — 99024 POSTOP FOLLOW-UP VISIT: CPT | Mod: S$GLB,,, | Performed by: PHYSICIAN ASSISTANT

## 2022-05-04 PROCEDURE — 3044F PR MOST RECENT HEMOGLOBIN A1C LEVEL <7.0%: ICD-10-PCS | Mod: CPTII,S$GLB,, | Performed by: PHYSICIAN ASSISTANT

## 2022-05-04 PROCEDURE — 1160F RVW MEDS BY RX/DR IN RCRD: CPT | Mod: CPTII,S$GLB,, | Performed by: PHYSICIAN ASSISTANT

## 2022-05-04 PROCEDURE — 1160F PR REVIEW ALL MEDS BY PRESCRIBER/CLIN PHARMACIST DOCUMENTED: ICD-10-PCS | Mod: CPTII,S$GLB,, | Performed by: PHYSICIAN ASSISTANT

## 2022-05-04 PROCEDURE — 3044F HG A1C LEVEL LT 7.0%: CPT | Mod: CPTII,S$GLB,, | Performed by: PHYSICIAN ASSISTANT

## 2022-05-04 PROCEDURE — 3008F PR BODY MASS INDEX (BMI) DOCUMENTED: ICD-10-PCS | Mod: CPTII,S$GLB,, | Performed by: PHYSICIAN ASSISTANT

## 2022-05-04 PROCEDURE — 4010F ACE/ARB THERAPY RXD/TAKEN: CPT | Mod: CPTII,S$GLB,, | Performed by: PHYSICIAN ASSISTANT

## 2022-05-04 PROCEDURE — 4010F PR ACE/ARB THEARPY RXD/TAKEN: ICD-10-PCS | Mod: CPTII,S$GLB,, | Performed by: PHYSICIAN ASSISTANT

## 2022-05-04 PROCEDURE — 99999 PR PBB SHADOW E&M-EST. PATIENT-LVL III: CPT | Mod: PBBFAC,,, | Performed by: PHYSICIAN ASSISTANT

## 2022-05-04 PROCEDURE — 99999 PR PBB SHADOW E&M-EST. PATIENT-LVL III: ICD-10-PCS | Mod: PBBFAC,,, | Performed by: PHYSICIAN ASSISTANT

## 2022-05-04 PROCEDURE — 99024 PR POST-OP FOLLOW-UP VISIT: ICD-10-PCS | Mod: S$GLB,,, | Performed by: PHYSICIAN ASSISTANT

## 2022-05-04 PROCEDURE — 1159F PR MEDICATION LIST DOCUMENTED IN MEDICAL RECORD: ICD-10-PCS | Mod: CPTII,S$GLB,, | Performed by: PHYSICIAN ASSISTANT

## 2022-05-04 PROCEDURE — 3008F BODY MASS INDEX DOCD: CPT | Mod: CPTII,S$GLB,, | Performed by: PHYSICIAN ASSISTANT

## 2022-05-04 NOTE — PROGRESS NOTES
Chad Chen is a 42 y.o. male who presents to clinic for follow-up status post right middle finger trigger release.  He is approximately 5 weeks status post surgery.  States overall he is doing very well, but states he does still have some stiffness as well as tenderness over the surgical site.  States pain is currently 0 10. States pain does increase to approximately 5/10 with significant stretching.  Denies any other complaints this time.    Physical Exam:  Examination of the right hand reveals a well-healed surgical site.  Minimal edema noted of the right middle finger.  No erythema, ecchymosis, or skin breakdown noted.  Able to flex the fingers within 1 cm of the distal palmar crease.  Able to fully extend the fingers of the right hand.  Mildly decreased sensation over the surgical site.  Tenderness to palpation over the surgical site.  Normal sensation in the radial, ulnar, and median nerve distributions.  Capillary refill less than 2 seconds in all fingers.    Radiology:  None.    Assessment:  Status post right middle finger trigger release    Plan:  1. I discussed with Chad Chen that the best course of action this time is to begin physical therapy to increase the function and regain the rest of his range of motion of his right hand.  He verbally agreed with the treatment plan.    2. He was referred to physical therapy in clinic today.    3. I would like him follow-up in clinic in 5 weeks for repeat evaluation.  He was instructed to contact clinic for any problems or concerns in the interim.

## 2022-05-09 ENCOUNTER — CLINICAL SUPPORT (OUTPATIENT)
Dept: REHABILITATION | Facility: HOSPITAL | Age: 42
End: 2022-05-09
Payer: COMMERCIAL

## 2022-05-09 DIAGNOSIS — M25.641 DECREASED RANGE OF MOTION OF FINGER OF RIGHT HAND: ICD-10-CM

## 2022-05-09 DIAGNOSIS — R29.898 DECREASED GRIP STRENGTH OF RIGHT HAND: ICD-10-CM

## 2022-05-09 DIAGNOSIS — R29.898 DECREASED PINCH STRENGTH: ICD-10-CM

## 2022-05-09 DIAGNOSIS — M79.641 PAIN OF RIGHT HAND: Primary | ICD-10-CM

## 2022-05-09 DIAGNOSIS — Z78.9 DECREASED ACTIVITIES OF DAILY LIVING (ADL): ICD-10-CM

## 2022-05-09 DIAGNOSIS — Z98.890 STATUS POST TRIGGER FINGER RELEASE: ICD-10-CM

## 2022-05-09 PROCEDURE — 97165 OT EVAL LOW COMPLEX 30 MIN: CPT | Mod: PO

## 2022-05-09 PROCEDURE — 97110 THERAPEUTIC EXERCISES: CPT | Mod: PO

## 2022-05-09 NOTE — PLAN OF CARE
[facility] Occupational Therapy  Initial Evaluation     Date: 5/9/2022  Patient: Chad Chen  Chart Number: [id number]    Therapy Diagnosis: R hand pain, decreased ROM R MF, decreased /pinch/ADL  Physician: Keo Powers, SIMA    Physician Orders: Patient is approximately 5 weeks status post right middle finger trigger release.  Patient is experiencing some stiffness, as well as some hypersensitivity over the surgical site.  Patient requires physical therapy to increase the function and range of motion of his right hand.     Duration:  4 weeks  Frequency:  2-3 times per week     Please work on range of motion, stretching, gentle scar massage, desensitization, edema control, and therapeutic modalities as tolerated.  Thanks!  Medical Diagnosis: Z98.890 (ICD-10-CM) - Status post trigger finger release  Evaluation Date: 5/9/2022  Insurance Authorization period Expiration: 12/31/22  Plan of Care Expiration Period: 7/4/22    Visit # / Visits Authortized: 1 / 1  Time In:4:01   Time Out: 4:45 pm  Total Billable Time: 20 minutes    Surgical Procedure:   Right middle finger A1 Pulley Release    Precautions: Standard and Diabetes    Date of Surgery: 3/31/21   S/P: 5 weeks on 5/5/22     Subjective     Involved Side: Right   Dominant Side: Right (writes with R, otherwise L dominant)   Date of Onset: approx 7/2021   Mechanism of Injury/ History of Current Condition: Pt initially noticed tiny catches, rianna in the morning, and then progressively worsened.  Pt sought medical attention in 8/2021 for triggering at R MF and received CSI, which pt states did not help. Sx recommended, and he states it did help.      Imaging: X rays:  None    Previous Therapy:  None     Patient's Goals for Therapy:    1. Needs to type all day  2. Use a sautering iron  3. Play game consoles  4. Wants to use brooms and pool brushes  5. Alleviate pain      Pain:  Functional Pain Scale Rating 0-10:   1-2/10 now  0/10 at best, in the  "morning   8/10 at worst, occasional short lived pains with bumping that subsides immediately and causes pt to drop objects.   Location: at [facility] surgery site   Description: Shooting  Aggravating Factors: bumping hand   Easing Factors: rest, "not poking incision"     Occupation:     Working presently: employed  Duties: currently tolerating 7 hours a day but is not doing hobby programming, which is an additional 4 hours per day.     Functional Limitations/Social History:    Previous functional status includes: Independent with all ADLs.     Current FunctionalStatus   Home/Living environment : lives with their wife     Limitation of Functional Status as follows:   ADLs/IADLs: Difficulty with sweeping, cleaning pool,     - Feeding: difficulty cutting food-using modified position     - Bathing: I     - Dressing/Grooming: I     - Driving: controlling steering wheel with palm rather than full      Leisure: using arie consoles and controllers, hobby programming,       Past Medical History/Physical Systems Review:   Chad Chen  has a past medical history of Asthma, Cardiac murmur, Dyslipidemia, Hypertension, Obesity, PONV (postoperative nausea and vomiting), and Seasonal and perennial allergic rhinitis.    Chad Chen  has a past surgical history that includes Laparoscopic cholecystectomy (2010); Adenoidectomy (2004); Tonsillectomy (2004); Carpal tunnel release (Left, 12/17/2020); and Trigger finger release (Right, 3/31/2022).    Chad has a current medication list which includes the following prescription(s): amlodipine, ibuprofen, levocetirizine, fish oil/borage/flax/om3,6,9 1, potassium chloride, proair respiclick, sumatriptan, and valsartan.    Review of patient's allergies indicates:   Allergen Reactions    Inderal [propranolol] Hives    Lisinopril Other (See Comments)     cough          Objective     Observation/Inspection:  Unremarkable     Sensation: Pt denies paresthesias in fingertips.  Intact " to light touch. Hypersensitivity reported with grasp from pressure.  Paresthesias reported just along incision and is improving.    Scar Minimal tenderness to palpation. Scar is  mildly -moderately thickened and raised, with mild adherence.       Edema:  Decreased dorsal finger crease visibility R vs L and at dorsal hand.           (in cm) L R   Proximal Wrist Crease     MPs  21.2 21.8   Long Proximal Phalanx 7.5 8.1              Range of Motion: Active  (Ext/Flex) R Middle   Left Middle     MCP Jt 0/75° 0/84°     PIP Jt 5+/90° 7+/105°     DIP Jt 0/61° 0/71°     HARLEY 226° 255°     TPM ° °                             Manual Muscle Test:  Not tested                                       and Pinch Strength: not tested at this time       Special Tests: mild tightness in R MF interossei    v bvc       CMS Impairment/Limitation/Restriction for Quick DASH Survey    Therapist reviewed Quick DASH scores for Chad Chen on 5/9/2022.   Quick DASH documents entered into [employer] - see Media section.    The Quick DASH Questionnaire- The following scores are based on patient reported assessment at the time of the initial occupational therapy evaluation:    Activity:  1. Open a tight jar: no Difficulty  2. Do heavy household chores: moderate Difficulty  3. Carry a shopping bag or briefcase: no Difficulty  4. Wash your back: no Difficulty  5.Use a knife to cut food:no Difficulty  6.. Recreational activities requiring force through arm: unable     7. Social Limitation: slightly Limited  8. Work/ADL Limitation: slightly Limited    Severity of Symptoms (over the past week)  9. Pain:  mild  10. Tingling: none    11. Sleeping Limitation: no Difficulty    Limitation Score: 20.5%         Treatment     Treatment Time In: 4:25 pm  Treatment Time Out: 4:45 pm  Total Treatment time separate from Evaluation time:20 min    Chad received the following manual therapy techniques for 6 minutes:   -Retrograde massage to R digits/hand/wrist x 3 min to  stimulate lymphatics to decrease pain,  edema and increase AROM and functional use.    -Performed scar massage to incision for 2 minutes to decrease adhesions and improve tensile glide.   -STM to ariana incision are    Chad received therapeutic exercises for 14 minutes including:  -AROM: joint blocks, wave, hook, fist, straight fist, lifts, MF abd, hook-fist-point, PIPJ extension with MPJ flexed,     Home Exercise Program/Education:  Issued HEP (see patient instructions in EMR) and educated on modality use for pain management . Exercises were reviewed and Chad was able to demonstrate them prior to the end of the session.   Pt received a written copy of exercises to perform at home. Chad demonstrated good  understanding of the education provided.  Pt was advised to perform these exercises free of pain, and to stop performing them if pain occurs.    Patient/Family Education: role of OT, goals for OT, scheduling/cancellations - pt verbalized understanding. Discussed insurance limitations with patient.    Assessment     Chad Chen is a 42 y.o. male referred to outpatient occupational/hand therapy and presents with a medical diagnosis of 5 weeks s/p R MF TFR, resulting in Paresthesias, pain, edema, decreased A/PROM, strength, and functional use of R ambidextrous UE and demonstrates limitations as described in the chart below.     The patient's rehab potential is Good.     Anticipated barriers to occupational therapy: none   Pt has no cultural, educational or language barriers to learning provided.    Profile and History Assessment of Occupational Performance Level of Clinical Decision Making Complexity Score   Occupational Profile:   Chad Chen is a 42 y.o. male who lives with their spouse and is currently employed as . Chad Chen has difficulty with  computer use,   arie, sweeping, cleaning pools, sautering  affecting his/her daily functional abilities. His/her main goal for therapy is 1. Needs to type all  day  2. Use a sautering iron  3. Play game consoles  4. Wants to use brooms and pool brushes  5. Alleviate pain.     Comorbidities:    has a past medical history of Asthma, Cardiac murmur, Dyslipidemia, Hypertension, Obesity, PONV (postoperative nausea and vomiting), and Seasonal and perennial allergic rhinitis.    Medical and Therapy History Review:   Expanded               Performance Deficits    Physical:  Joint Mobility  Muscle Power/Strength  Muscle Endurance  Skin Integrity/Scar Formation  Edema   Strength  Pinch Strength  Fine Motor Coordination  Pain    Cognitive:  No Deficits    Psychosocial:    No Deficits     Clinical Decision Making:  low    Assessment Process:  Detailed Assessments    Modification/Need for Assistance:  Not Necessary    Intervention Selection:  Multiple Treatment Options       low  Based on PMHX, co morbidities , data from assessments and functional level of assistance required with task and clinical presentation directly impacting function.       The following goals were discussed with the patient and patient is in agreement with them as to be addressed in the treatment plan.     Goals:   Short Term Goals: (4 weeks)  1. Pt will be independent with HEP in 2 visits.  2. Pt will report decreased pain to 5-6/10 at worst.  3. Pt will increase HARLEY R middle finger by 20 degrees to facilitate functional grasp.    Long Term Goals: (by discharge)  1. Pt will report decreased pain to 1-2/10 with ADLs.   2. Pt will make a full, flat, composite fist to enable grasping and squeezing objects for self-care.  3. Pt will exhibit R  at 60-75% of L comparative  strength to allow a firm grasp on cooking utensils, steering wheel, etc.  4. Pt will exhibit  11-12#  functional pinch strength to allow writing, opening containers, and turning keys.  5. Pt will exhibit a significant increase (10+ points) in the Quick DASH assessment.  6. Pt will return to PLOF.       Plan     Certification Period/Plan  of care expiration: 5/9/2022 to 7/4/22.    Outpatient Occupational Therapy 2-3 times weekly for 4 weeks to include the following interventions:     Modalities to decrease pain (moist heat, paraffin, fluidotherapy, US ), edema control, scar mobilization, soft tissue mobilization, manual therapy/joint mobilizations,A/PROM, therapeutic exercises/activities, strengthening, orthotic fitting/fabrication/training PRN,  HEP/education as well as any other treatments deemed necessary based on the patient's needs or progress.    Updates Next Visit: review HEP, initiate light functional exercises, add isolated FDS NADIA [place], CHT

## 2022-05-10 NOTE — PROGRESS NOTES
Occupational Therapy Daily Treatment Note     Name: Chad Chen  Clinic Number: [id number]      Visit Date: 5/11/2022    Therapy Diagnosis: R hand pain, decreased ROM R MF, decreased /pinch/ADL  Physician: Keo Powers, SIMA     Physician Orders: Patient is approximately 5 weeks status post right middle finger trigger release.  Patient is experiencing some stiffness, as well as some hypersensitivity over the surgical site.  Patient requires physical therapy to increase the function and range of motion of his right hand.     Duration:  4 weeks  Frequency:  2-3 times per week     Please work on range of motion, stretching, gentle scar massage, desensitization, edema control, and therapeutic modalities as tolerated.  Thanks!  Medical Diagnosis: Z98.890 (ICD-10-CM) - Status post trigger finger release  Evaluation Date: 5/9/2022  Insurance Authorization period Expiration: 12/31/22  Plan of Care Expiration Period: 7/4/22     Visit # / Visits Authortized: 2 / 20  Time In: 3:00 pm  Time Out: 3:47 pm   Total Billable Time: 42 minutes     Surgical Procedure:   Right middle finger A1 Pulley Release     Precautions: Standard and Diabetes     Date of Surgery: 3/31/21                               S/P: 5 weeks on 5/5/22        Subjective     Pt reports: Soreness and fatigue with exercises.   he was compliant with home exercise program given last session.   Response to previous treatment:good  Functional change: none reported    Pain:  Functional Pain Scale Rating 0-10:   1-2/10 now  0/10 at best, in the morning   8/10 at worst, occasional short lived pains with bumping that subsides immediately and causes pt to drop objects.   Location: at R MF surgery site        Objective       Range of Motion: Active  (Ext/Flex) R Middle   Left Middle       MCP Jt 0/76° (+1)  0/84°       PIP Jt 3+/91° (-2/+1)  7+/105°       DIP Jt 0/61° (-1)  0/71°       HARLEY 225° (-1)  255°       TPM ° °             Chad received the following supervised  modalities after being cleared for contradictions for 5 minutes:   -Moist Heat applied to R hand x 5 min to decrease pain and  increase blood flow and tissue elasticity prior to treatment.       Chad received the following direct contact modalities after being cleared for contraindications for 0 minutes:    Chad received the following manual therapy techniques for 5 minutes:   -Retrograde massage to R digits/hand/wrist x 3 min to stimulate lymphatics to decrease pain,  edema and increase AROM and functional use.    -Performed scar massage to incision for 2 minutes to decrease adhesions and improve tensile glide.       Chad received therapeutic exercises for 32 minutes including:  -Gentle passive stretch, isolated and composite joints MF       -AROM: joint blocks, wave, hook, fist, straight fist, lifts, MF abd, hook-fist-point, PIPJ extension with MPJ flexed,   -Isolated FDS glides 20x MF 10x uninvolved fingers  -Self stretches hook, PIP ext, composite ext    Chad participated in dynamic functional therapeutic activities to improve functional performance for 5  minutes, including:  -Towel scrunches 10x  -Isospheres for 3 min    Home Exercises and Education Provided     Education provided:   -reviewed HEP    Written Home Exercises Provided: Patient instructed to cont prior HEP.  Exercises were reviewed and Chad was able to demonstrate them prior to the end of the session.  Chad demonstrated good  understanding of the education provided.   .   See EMR under Patient Instructions for exercises provided prior visit.        Assessment     Pt would continue to benefit from skilled OT. Pt. Demonstrated good understanding of HEP. Tolerated passive stretch well with mild report of discomfort. Initiated light functional exercises. No objective change in HARLEY of R MF.     - Progress towards goals: , STG 1 met     Chad is progressing well towards his goals and there are no updates to goals at this time. Pt prognosis is Good.     Pt will  continue to benefit from skilled outpatient occupational therapy to address the deficits listed in the problem list on initial evaluation provide pt/family education and to maximize pt's level of independence in the home and community environment.     Anticipated barriers to occupational therapy: none    Pt's spiritual, cultural and educational needs considered and pt agreeable to plan of care and goals.    Goals:   Short Term Goals: (4 weeks)  1. Pt will be independent with HEP in 2 visits. (met 5/12/22)   2. Pt will report decreased pain to 5-6/10 at worst.  3. Pt will increase HARLEY R middle finger by 20 degrees to facilitate functional grasp.     Long Term Goals: (by discharge)  1. Pt will report decreased pain to 1-2/10 with ADLs.   2. Pt will make a full, flat, composite fist to enable grasping and squeezing objects for self-care.  3. Pt will exhibit R  at 60-75% of L comparative  strength to allow a firm grasp on cooking utensils, steering wheel, etc.  4. Pt will exhibit  11-12#  functional pinch strength to allow writing, opening containers, and turning keys.  5. Pt will exhibit a significant increase (10+ points) in the Quick DASH assessment.  6. Pt will return to PLOF.        Plan     Certification Period/Plan of care expiration: 5/9/2022 to 7/4/22.    Continue Occupational Therapy 2-3  times per week fthrough certification period to decrease pain and edema, and increase A/PROM, strength, and functional use of R upper extremity.    Updates/Grading for next session: progress as tolerated      NADIA Berger, JULIAT

## 2022-05-11 ENCOUNTER — CLINICAL SUPPORT (OUTPATIENT)
Dept: REHABILITATION | Facility: HOSPITAL | Age: 42
End: 2022-05-11
Payer: COMMERCIAL

## 2022-05-11 DIAGNOSIS — R29.898 DECREASED PINCH STRENGTH: ICD-10-CM

## 2022-05-11 DIAGNOSIS — R29.898 DECREASED GRIP STRENGTH OF RIGHT HAND: ICD-10-CM

## 2022-05-11 DIAGNOSIS — M25.641 DECREASED RANGE OF MOTION OF FINGER OF RIGHT HAND: ICD-10-CM

## 2022-05-11 DIAGNOSIS — Z78.9 DECREASED ACTIVITIES OF DAILY LIVING (ADL): ICD-10-CM

## 2022-05-11 DIAGNOSIS — M79.641 PAIN OF RIGHT HAND: Primary | ICD-10-CM

## 2022-05-11 PROCEDURE — 97110 THERAPEUTIC EXERCISES: CPT | Mod: PO

## 2022-05-11 PROCEDURE — 97530 THERAPEUTIC ACTIVITIES: CPT | Mod: PO

## 2022-05-12 ENCOUNTER — PATIENT MESSAGE (OUTPATIENT)
Dept: ADMINISTRATIVE | Facility: OTHER | Age: 42
End: 2022-05-12
Payer: COMMERCIAL

## 2022-05-16 ENCOUNTER — CLINICAL SUPPORT (OUTPATIENT)
Dept: REHABILITATION | Facility: HOSPITAL | Age: 42
End: 2022-05-16
Payer: COMMERCIAL

## 2022-05-16 DIAGNOSIS — R29.898 DECREASED PINCH STRENGTH: ICD-10-CM

## 2022-05-16 DIAGNOSIS — R29.898 DECREASED GRIP STRENGTH OF RIGHT HAND: ICD-10-CM

## 2022-05-16 DIAGNOSIS — M79.641 PAIN OF RIGHT HAND: ICD-10-CM

## 2022-05-16 DIAGNOSIS — M25.641 DECREASED RANGE OF MOTION OF FINGER OF RIGHT HAND: ICD-10-CM

## 2022-05-16 DIAGNOSIS — Z78.9 DECREASED ACTIVITIES OF DAILY LIVING (ADL): Primary | ICD-10-CM

## 2022-05-16 PROCEDURE — 97110 THERAPEUTIC EXERCISES: CPT | Mod: PO

## 2022-05-16 PROCEDURE — 97530 THERAPEUTIC ACTIVITIES: CPT | Mod: PO

## 2022-05-16 NOTE — PROGRESS NOTES
Occupational Therapy Daily Treatment Note     Name: Chad Chen  Clinic Number: [id number]      Visit Date: 5/16/2022    Therapy Diagnosis: R hand pain, decreased ROM R MF, decreased /pinch/ADL  Physician: Keo Powers, SIMA     Physician Orders: Patient is approximately 5 weeks status post right middle finger trigger release.  Patient is experiencing some stiffness, as well as some hypersensitivity over the surgical site.  Patient requires physical therapy to increase the function and range of motion of his right hand.     Duration:  4 weeks  Frequency:  2-3 times per week     Please work on range of motion, stretching, gentle scar massage, desensitization, edema control, and therapeutic modalities as tolerated.  Thanks!  Medical Diagnosis: Z98.890 (ICD-10-CM) - Status post trigger finger release  Evaluation Date: 5/9/2022  Insurance Authorization period Expiration: 12/31/22  Plan of Care Expiration Period: 7/4/22     Visit # / Visits Authortized: 3 / 20  Time In: 8:32 am  Time Out: 9:15 am   Total Billable Time: 36 minutes     Surgical Procedure:   Right middle finger A1 Pulley Release     Precautions: Standard and Diabetes     Date of Surgery: 3/31/21                               S/P: 5 weeks on 5/5/22        Subjective     Pt reports: pain is increasing, happened at 9/10 level when washing hands. Compliance with HEP 3x/day but also unconsciously rubbing incision throughout the day.   She was compliant with home exercise program given last session.   Response to previous treatment:good  Functional change: helped daughter move, cleaned pool but used L hand more     Pain:  Functional Pain Scale Rating 0-10:   2/10 now  0/10 at best, in the morning   9/10 at worst, when washing hands with electric jolt up to shoulder.   Location: at R MF surgery site        Objective       Range of Motion: Active  (Ext/Flex) R Middle   Left Middle       MCP Jt 0/76° (+1)  0/84°       PIP Jt 3+/91° (-2/+1)  7+/105°       DIP  Jt 0/61° (-1)  0/71°       HARLEY 225° (-1)  255°       TPM ° °             Chad received the following supervised modalities after being cleared for contradictions for 0 minutes:   -Moist Heat applied to R hand x 5 min to decrease pain and  increase blood flow and tissue elasticity prior to treatment.  (NT)       Chad received the following direct contact modalities after being cleared for contraindications for 7 minutes:  -Ultrasound:  3 Mhz, 80 % duty cycle, 1.0 w/cm2 for 7 minutes to decrease pain and improve circulation and  decrease adhesions, and improve tissue extensibility.       Chad received the following manual therapy techniques for 5 minutes:   -Retrograde massage to R digits/hand/wrist x 3 min to stimulate lymphatics to decrease pain,  edema and increase AROM and functional use.    -Performed scar massage to incision for 2 minutes to decrease adhesions and improve tensile glide.       Chad received therapeutic exercises for 16 minutes including:  -Gentle passive stretch, isolated and composite joints MF       -AROM: joint blocks, wave, hook, fist, straight fist, lifts, MF abd, hook-fist-point, PIPJ extension with MPJ flexed,  (NT)   -Isolated FDS glides 20x MF 10x uninvolved fingers      Chad participated in dynamic functional therapeutic activities to improve functional performance for 15   minutes, including:  -Towel scrunches 10x  -Isospheres for 3 min  -in hand manipulation with marbles 1 container  -red x , ext only 30x   -intrinsic + isometrics with blue sponge 20 reps   -red flexbar rolling for scar mob and stretch 1  m in     Home Exercises and Education Provided     Education provided:   -reviewed HEP    Written Home Exercises Provided: Patient instructed to cont prior HEP.  Exercises were reviewed and Chad was able to demonstrate them prior to the end of the session.  Chad demonstrated good  understanding of the education provided.   .   See EMR under Patient Instructions for exercises provided  prior visit.        Assessment     Pt would continue to benefit from skilled OT. Pt. Trial of US however pt reported feeling shocking sensation towards the end of treatment so US was deferred. Progressed light functional exercises, which pt tolerated well. Pt with reports of increasing pain when pressure applied to certain areas of incisions during functional use but tolerated scar mob well.     - Progress towards goals: , Oliver Bonilla is progressing well towards his goals and there are no updates to goals at this time. Pt prognosis is Good.     Pt will continue to benefit from skilled outpatient occupational therapy to address the deficits listed in the problem list on initial evaluation provide pt/family education and to maximize pt's level of independence in the home and community environment.     Anticipated barriers to occupational therapy: none    Pt's spiritual, cultural and educational needs considered and pt agreeable to plan of care and goals.    Goals:   Short Term Goals: (4 weeks)  1. Pt will be independent with HEP in 2 visits. (met 5/12/22)   2. Pt will report decreased pain to 5-6/10 at worst.  3. Pt will increase HARLEY R middle finger by 20 degrees to facilitate functional grasp.     Long Term Goals: (by discharge)  1. Pt will report decreased pain to 1-2/10 with ADLs.   2. Pt will make a full, flat, composite fist to enable grasping and squeezing objects for self-care.  3. Pt will exhibit R  at 60-75% of L comparative  strength to allow a firm grasp on cooking utensils, steering wheel, etc.  4. Pt will exhibit  11-12#  functional pinch strength to allow writing, opening containers, and turning keys.  5. Pt will exhibit a significant increase (10+ points) in the Quick DASH assessment.  6. Pt will return to PLOF.        Plan     Certification Period/Plan of care expiration: 5/9/2022 to 7/4/22.    Continue Occupational Therapy 2-3  times per week fthrough certification period to decrease pain and  edema, and increase A/PROM, strength, and functional use of R upper extremity.    Updates/Grading for next session: progress as tolerated      Yvon Jaeger, MANFRED,  OTS   I certify that I was present in the room directing the student in service delivery and guiding them using my skilled judgement. As the co-signing therapist, I have reviewed the students documentation and I am responsible for the treatment, assessment, and plan.     NADIA Berger, JULIAT

## 2022-05-19 ENCOUNTER — CLINICAL SUPPORT (OUTPATIENT)
Dept: REHABILITATION | Facility: HOSPITAL | Age: 42
End: 2022-05-19
Payer: COMMERCIAL

## 2022-05-19 DIAGNOSIS — M79.641 PAIN OF RIGHT HAND: ICD-10-CM

## 2022-05-19 DIAGNOSIS — Z78.9 DECREASED ACTIVITIES OF DAILY LIVING (ADL): Primary | ICD-10-CM

## 2022-05-19 DIAGNOSIS — R29.898 DECREASED GRIP STRENGTH OF RIGHT HAND: ICD-10-CM

## 2022-05-19 DIAGNOSIS — R29.898 DECREASED PINCH STRENGTH: ICD-10-CM

## 2022-05-19 DIAGNOSIS — M25.641 DECREASED RANGE OF MOTION OF FINGER OF RIGHT HAND: ICD-10-CM

## 2022-05-19 PROCEDURE — 97530 THERAPEUTIC ACTIVITIES: CPT | Mod: PO

## 2022-05-19 PROCEDURE — 97110 THERAPEUTIC EXERCISES: CPT | Mod: PO

## 2022-05-19 NOTE — PROGRESS NOTES
"  Occupational Therapy Daily Treatment Note     Name: Chad Chen  Clinic Number: [id number]      Visit Date: 5/19/2022    Therapy Diagnosis: R hand pain, decreased ROM R MF, decreased /pinch/ADL  Physician: Keo Powers, SIMA     Physician Orders: Patient is approximately 5 weeks status post right middle finger trigger release.  Patient is experiencing some stiffness, as well as some hypersensitivity over the surgical site.  Patient requires physical therapy to increase the function and range of motion of his right hand.     Duration:  4 weeks  Frequency:  2-3 times per week     Please work on range of motion, stretching, gentle scar massage, desensitization, edema control, and therapeutic modalities as tolerated.  Thanks!  Medical Diagnosis: Z98.890 (ICD-10-CM) - Status post trigger finger release  Evaluation Date: 5/9/2022  Insurance Authorization period Expiration: 12/31/22  Plan of Care Expiration Period: 7/4/22     Visit # / Visits Authortized: 4 / 20  Time In: 8:30 am  Time Out: 9:15 am   Total Billable Time:40 minutes     Surgical Procedure:   Right middle finger A1 Pulley Release     Precautions: Standard and Diabetes     Date of Surgery: 3/31/21                               S/P: 7 weeks on 5/19/22        Subjective     Pt reports: hand is feeling better. "I can move it better." Continues with hypersensitivity, which is worse with light touch.   She was compliant with home exercise program given last session.   Response to previous treatment:good  Functional change: none reorted      Pain:  Functional Pain Scale Rating 0-10:   2/10 now  0/10 at best, in the morning   6/10 at worst,   (decreasd 3 levels)   Location: at R MF surgery site        Objective       Range of Motion: Active  (Ext/Flex) R Middle   Left Middle       MCP Jt 0/70° (-6)  0/84°       PIP Jt 4+/96° (+1/+5)  7+/105°       DIP Jt 0/64° (+3)  0/71°       HARLEY 228° (+3)  255°       TPM ° °             Chad received the following supervised " modalities after being cleared for contradictions for 0 minutes:   -Moist Heat applied to R hand x 5 min to decrease pain and  increase blood flow and tissue elasticity prior to treatment.        Chad received the following manual therapy techniques for 5 minutes:   -Retrograde massage to R digits/hand/wrist x 3 min to stimulate lymphatics to decrease pain,  edema and increase AROM and functional use.    -Performed scar massage to incision for 2 minutes to decrease adhesions and improve tensile glide.       Chad received therapeutic exercises for 10 minutes including:  -Gentle passive stretch, isolated and composite joints MF       -AROM: joint blocks, wave, hook, fist, straight fist, lifts, MF abd, hook-fist-point, PIPJ extension with MPJ flexed,  (NT)   -Isolated FDS glides 20x MF 10x uninvolved fingers       Chad participated in dynamic functional therapeutic activities to improve functional performance for 25   minutes, including:  -yellow putty squeezing 3 min   -functional pinches blue CP 20x ea, MF pinch with green 10x   -green x , ext only 30x   -intrinsic + isometrics with green sponge 20 reps   -resisted finger add with pink sponges 5 sec holds 20x  -vibration 3 min  -mac bin gross grasp 3 min   -tapping to scar       Home Exercises and Education Provided     Education provided:   -    Written Home Exercises Provided: Patient instructed to cont prior HEP.  Exercises were reviewed and Chad was able to demonstrate them prior to the end of the session.  Chad demonstrated good  understanding of the education provided.   .   See EMR under Patient Instructions for exercises provided prior visit.        Assessment     Pt would continue to benefit from skilled OT. HARLEY increased 3 degrees. Progressed strengthening and resistance levels. Added desensitization exercises with tapping, textures, and vibration.       - Progress towards goals: Good     Chad is progressing well towards his goals and there are no updates  to goals at this time. Pt prognosis is Good.     Pt will continue to benefit from skilled outpatient occupational therapy to address the deficits listed in the problem list on initial evaluation provide pt/family education and to maximize pt's level of independence in the home and community environment.     Anticipated barriers to occupational therapy: none    Pt's spiritual, cultural and educational needs considered and pt agreeable to plan of care and goals.    Goals:   Short Term Goals: (4 weeks)  1. Pt will be independent with HEP in 2 visits. (met 5/12/22)   2. Pt will report decreased pain to 5-6/10 at worst. (met 5/19/22)   3. Pt will increase HARLEY R middle finger by 20 degrees to facilitate functional grasp.     Long Term Goals: (by discharge)  1. Pt will report decreased pain to 1-2/10 with ADLs.   2. Pt will make a full, flat, composite fist to enable grasping and squeezing objects for self-care.  3. Pt will exhibit R  at 60-75% of L comparative  strength to allow a firm grasp on cooking utensils, steering wheel, etc.  4. Pt will exhibit  11-12#  functional pinch strength to allow writing, opening containers, and turning keys.  5. Pt will exhibit a significant increase (10+ points) in the Quick DASH assessment.  6. Pt will return to PLOF.        Plan     Certification Period/Plan of care expiration: 5/9/2022 to 7/4/22.    Continue Occupational Therapy 2-3  times per week fthrough certification period to decrease pain and edema, and increase A/PROM, strength, and functional use of R upper extremity.    Updates/Grading for next session: progress as tolerated      Yvon Jaeger III,  OTS   I certify that I was present in the room directing the student in service delivery and guiding them using my skilled judgement. As the co-signing therapist, I have reviewed the students documentation and I am responsible for the treatment, assessment, and plan.     NADIA Berger, JULIAT

## 2022-05-19 NOTE — PROGRESS NOTES
"  Occupational Therapy Daily Treatment Note     Name: Chad Chen  Clinic Number: [id number]      Visit Date: 5/23/2022    Therapy Diagnosis: R hand pain, decreased ROM R MF, decreased /pinch/ADL  Physician: Keo Powers, SIMA     Physician Orders: Patient is approximately 5 weeks status post right middle finger trigger release.  Patient is experiencing some stiffness, as well as some hypersensitivity over the surgical site.  Patient requires physical therapy to increase the function and range of motion of his right hand.     Duration:  4 weeks  Frequency:  2-3 times per week     Please work on range of motion, stretching, gentle scar massage, desensitization, edema control, and therapeutic modalities as tolerated.  Thanks!  Medical Diagnosis: Z98.890 (ICD-10-CM) - Status post trigger finger release  Evaluation Date: 5/9/2022  Insurance Authorization period Expiration: 12/31/22  Plan of Care Expiration Period: 7/4/22     Visit # / Visits Authortized: 5 / 20  Time In: 8:25 am  Time Out: 9:15 am   Total Billable Time:45 minutes     Surgical Procedure:   Right middle finger A1 Pulley Release     Precautions: Standard and Diabetes     Date of Surgery: 3/31/21                               S/P: 7 weeks on 5/19/22        Subjective     Pt reports: "My hand is sore. I have been doing more with my right hand than I've been able to in the past."   he was compliant with home exercise program given last session.   Response to previous treatment:good-some soreness after last session  Functional change: sauntering     Pain:  Functional Pain Scale Rating 0-10:   2/10 now  0/10 at best, in the morning   5/10 at worst,   (decreasd 1 level)   Location: at R MF surgery site        Objective       Range of Motion: Active  (Ext/Flex) R Middle   Left Middle       MCP Jt 0/70° (-6)  0/84°       PIP Jt 4+/96° (+1/+5)  7+/105°       DIP Jt 0/64° (+3)  0/71°       HARLEY 228° (+3)  255°       TPM ° °          and Pinch Strength (in " pounds, psi's):   Left Right    II      III 74 54   Lateral 20.5 22   Tripod 18 19   Tip 11 15.5           Chad received the following supervised modalities after being cleared for contradictions for 5 minutes:   -Moist Heat applied to R hand x 5 min to decrease pain and  increase blood flow and tissue elasticity prior to treatment.        Chad received the following manual therapy techniques for 3 minutes:   -Retrograde massage to R digits/hand/wrist x 3 min to stimulate lymphatics to decrease pain,  edema and increase AROM and functional use.  (NT)  -Performed scar massage to incision for 3 minutes to decrease adhesions and improve tensile glide.       Chad received therapeutic exercises for 8 minutes including:  -Gentle passive stretch, isolated and composite joints MF       -AROM: joint blocks, wave, hook, fist, straight fist, lifts, MF abd, hook-fist-point, PIPJ extension with MPJ flexed,  (NT)   -Isolated FDS glides 20x MF 10x uninvolved fingers (NT)      Chad participated in dynamic functional therapeutic activities to improve functional performance for 34 minutes, including:  -pink putty squeezing 3 min   -functional pinches blue CP 20x ea  -green x , ext only 30x   -intrinsic + isometrics with green sponge 20 reps   -resisted finger add with pink sponges 5 sec holds 20x and abd with 1 RB 30x and 20x isolated MF   -vibration 2 min  -corn bin gross grasp 2 min   -tapping to scar (NT)   -sautering sim with large dowel putty tool pushing into putty  -finger weights 30 g, lists, isolated pip ext with mp flexed and extended 30 each      Home Exercises and Education Provided     Education provided:   Continue same HEP    Written Home Exercises Provided: Patient instructed to cont prior HEP.  Exercises were reviewed and Chad was able to demonstrate them prior to the end of the session.  Chad demonstrated good  understanding of the education provided.   .   See EMR under Patient Instructions for exercises  provided prior visit.        Assessment     Pt would continue to benefit from skilled OT. Baseline  and pinch measures taken. R  is 73% of contralateral L side. Key and tripod pinches are slightly decreased. Continue to progress program as able with pt. tolerating well.    - Progress towards goals: Maxx Bonilla is progressing well towards his goals and there are no updates to goals at this time. Pt prognosis is Good.     Pt will continue to benefit from skilled outpatient occupational therapy to address the deficits listed in the problem list on initial evaluation provide pt/family education and to maximize pt's level of independence in the home and community environment.     Anticipated barriers to occupational therapy: none    Pt's spiritual, cultural and educational needs considered and pt agreeable to plan of care and goals.    Goals:   Short Term Goals: (4 weeks)  1. Pt will be independent with HEP in 2 visits. (met 5/12/22)   2. Pt will report decreased pain to 5-6/10 at worst. (met 5/19/22)   3. Pt will increase HARLEY R middle finger by 20 degrees to facilitate functional grasp.     Long Term Goals: (by discharge)  1. Pt will report decreased pain to 1-2/10 with ADLs.   2. Pt will make a full, flat, composite fist to enable grasping and squeezing objects for self-care.  3. Pt will exhibit R  at 60-75% of L comparative  strength to allow a firm grasp on cooking utensils, steering wheel, etc. (Met 5/23/2022)    Modify- 3b. Pt. Will increase R  strength to 70-80#s to return to PLOF.  4. Pt will exhibit  11-12#  functional pinch strength to allow writing, opening containers, and turning keys.  (Met 5/23/2022)    Modify- 4b. Pt. Will increase key and tripod pinches by 1-3 #s.  5. Pt will exhibit a significant increase (10+ points) in the Quick DASH assessment.  6. Pt will return to PLOF.        Plan     Certification Period/Plan of care expiration: 5/9/2022 to 7/4/22.    Continue Occupational  Therapy 2-3  times per week fthrough certification period to decrease pain and edema, and increase A/PROM, strength, and functional use of R upper extremity.    Updates/Grading for next session: progress as tolerated      Yvon Jaeger, III,  OTS   I certify that I was present in the room directing the student in service delivery and guiding them using my skilled judgement. As the co-signing therapist, I have reviewed the students documentation and I am responsible for the treatment, assessment, and plan.     NADIA Berger, JULIAT

## 2022-05-23 ENCOUNTER — CLINICAL SUPPORT (OUTPATIENT)
Dept: REHABILITATION | Facility: HOSPITAL | Age: 42
End: 2022-05-23
Payer: COMMERCIAL

## 2022-05-23 DIAGNOSIS — Z78.9 DECREASED ACTIVITIES OF DAILY LIVING (ADL): Primary | ICD-10-CM

## 2022-05-23 DIAGNOSIS — R29.898 DECREASED GRIP STRENGTH OF RIGHT HAND: ICD-10-CM

## 2022-05-23 DIAGNOSIS — M25.641 DECREASED RANGE OF MOTION OF FINGER OF RIGHT HAND: ICD-10-CM

## 2022-05-23 DIAGNOSIS — M79.641 PAIN OF RIGHT HAND: ICD-10-CM

## 2022-05-23 DIAGNOSIS — R29.898 DECREASED PINCH STRENGTH: ICD-10-CM

## 2022-05-23 PROCEDURE — 97530 THERAPEUTIC ACTIVITIES: CPT | Mod: PO

## 2022-05-23 PROCEDURE — 97110 THERAPEUTIC EXERCISES: CPT | Mod: PO

## 2022-05-26 ENCOUNTER — CLINICAL SUPPORT (OUTPATIENT)
Dept: REHABILITATION | Facility: HOSPITAL | Age: 42
End: 2022-05-26
Payer: COMMERCIAL

## 2022-05-26 DIAGNOSIS — M79.641 PAIN OF RIGHT HAND: ICD-10-CM

## 2022-05-26 DIAGNOSIS — R29.898 DECREASED GRIP STRENGTH OF RIGHT HAND: ICD-10-CM

## 2022-05-26 DIAGNOSIS — R29.898 DECREASED PINCH STRENGTH: ICD-10-CM

## 2022-05-26 DIAGNOSIS — M25.641 DECREASED RANGE OF MOTION OF FINGER OF RIGHT HAND: ICD-10-CM

## 2022-05-26 DIAGNOSIS — Z78.9 DECREASED ACTIVITIES OF DAILY LIVING (ADL): Primary | ICD-10-CM

## 2022-05-26 PROCEDURE — 97110 THERAPEUTIC EXERCISES: CPT | Mod: PO

## 2022-05-26 PROCEDURE — 97530 THERAPEUTIC ACTIVITIES: CPT | Mod: PO

## 2022-05-26 NOTE — PROGRESS NOTES
"  Occupational Therapy Daily Treatment Note     Name: Chad Chen  Clinic Number: [id number]      Visit Date: 5/26/2022    Therapy Diagnosis: R hand pain, decreased ROM R MF, decreased /pinch/ADL  Physician: Keo Powers, SIMA     Physician Orders: Patient is approximately 5 weeks status post right middle finger trigger release.  Patient is experiencing some stiffness, as well as some hypersensitivity over the surgical site.  Patient requires physical therapy to increase the function and range of motion of his right hand.     Duration:  4 weeks  Frequency:  2-3 times per week     Please work on range of motion, stretching, gentle scar massage, desensitization, edema control, and therapeutic modalities as tolerated.  Thanks!  Medical Diagnosis: Z98.890 (ICD-10-CM) - Status post trigger finger release  Evaluation Date: 5/9/2022  Insurance Authorization period Expiration: 12/31/22  Plan of Care Expiration Period: 7/4/22     Visit # / Visits Authortized: 6 / 20  Time In: 8:31 am  Time Out: 9:15 am   Total Billable Time:44 minutes     Surgical Procedure:   Right middle finger A1 Pulley Release     Precautions: Standard and Diabetes     Date of Surgery: 3/31/21                               S/P: 8 weeks on 5/26/22   Next MD appt 6/8/22      Subjective     Pt reports: "My finger feels more free now not like it's moving through molasses anymore. I can make a fist and when I open it, it feels free."    he was compliant with home exercise program given last session.   Response to previous treatment:good-some soreness after last session  Functional change: sauntering     Pain:  Functional Pain Scale Rating 0-10:   2/10 now  0/10 at best, in the morning   4/10 at worst,   (decreased 1 level)   Location: at R MF surgery site        Objective     MF measured this date   Range of Motion: Active  (Ext/Flex) R Middle   Left Middle       MCP Jt 0/74° (+4)  0/84°       PIP Jt 4+/90° (NT/-6)  7+/105°       DIP Jt 0/67° (+3)  0/71° "       HARLEY 229° (+1)  255°       TPM ° °          and Pinch Strength (in pounds, psi's):   Left Right    II      III 74 54   Lateral 20.5 22   Tripod 18 19   Tip 11 15.5       Chad received the following supervised modalities after being cleared for contradictions for 0 minutes:   -Moist Heat applied to R hand x 0 min to decrease pain and  increase blood flow and tissue elasticity prior to treatment.        Chad received the following manual therapy techniques for 3 minutes:   -Retrograde massage to R digits/hand/wrist x 3 min to stimulate lymphatics to decrease pain,  edema and increase AROM and functional use.  (NT)  -Performed scar massage to incision for 3 minutes to decrease adhesions and improve tensile glide.       Chad received therapeutic exercises for 5 minutes including:  -Gentle passive stretch, isolated and composite joints MF       -AROM: joint blocks, wave, hook, fist, straight fist, lifts, MF abd, hook-fist-point, PIPJ extension with MPJ flexed,  (NT)   -Isolated FDS glides 20x MF 10x uninvolved fingers  (NT)       Chad participated in dynamic functional therapeutic activities to improve functional performance for 36 minutes, including:  -pink putty squeezing 3 min   -functional pinches blue CP 20x ea  -green x , ext only 30x   -intrinsic + isometrics with green sponge 20 reps   -resisted finger add with pink sponges 5 sec holds 20x and abd with 1 RB 30x and 20x isolated MF   -vibration 3 min  -rice bin gross grasp 3 min   -jar and bottle putty tools   -finger weights 30 g, lists, isolated pip ext with mp flexed and extended 30 each  -calibrated gripper 35# 3/20       Home Exercises and Education Provided     Education provided:   Continue same HEP    Written Home Exercises Provided: Patient instructed to cont prior HEP.  Exercises were reviewed and Chad was able to demonstrate them prior to the end of the session.  Chad demonstrated good  understanding of the education provided.   .    See EMR under Patient Instructions for exercises provided prior visit.        Assessment     Pt would continue to benefit from skilled OT.  Pain at worst has decreased 1 level. Progressed strengthening and added tasks requiring pressure in palm, which pt tolerated well.     - Progress towards goals: Maxx Bonilla is progressing well towards his goals and there are no updates to goals at this time. Pt prognosis is Good.     Pt will continue to benefit from skilled outpatient occupational therapy to address the deficits listed in the problem list on initial evaluation provide pt/family education and to maximize pt's level of independence in the home and community environment.     Anticipated barriers to occupational therapy: none    Pt's spiritual, cultural and educational needs considered and pt agreeable to plan of care and goals.    Goals:   Short Term Goals: (4 weeks)  1. Pt will be independent with HEP in 2 visits. (met 5/12/22)   2. Pt will report decreased pain to 5-6/10 at worst. (met 5/19/22)   3. Pt will increase HARLEY R middle finger by 20 degrees to facilitate functional grasp.     Long Term Goals: (by discharge)  1. Pt will report decreased pain to 1-2/10 with ADLs.   2. Pt will make a full, flat, composite fist to enable grasping and squeezing objects for self-care.  3. Pt will exhibit R  at 60-75% of L comparative  strength to allow a firm grasp on cooking utensils, steering wheel, etc. (Met 5/23/2022)    Modify- 3b. Pt. Will increase R  strength to 70-80#s to return to PLOF.  4. Pt will exhibit  11-12#  functional pinch strength to allow writing, opening containers, and turning keys.  (Met 5/23/2022)    Modify- 4b. Pt. Will increase key and tripod pinches by 1-3 #s.  5. Pt will exhibit a significant increase (10+ points) in the Quick DASH assessment.  6. Pt will return to PLOF.        Plan     Certification Period/Plan of care expiration: 5/9/2022 to 7/4/22.    Continue Occupational Therapy  2-3  times per week fthrough certification period to decrease pain and edema, and increase A/PROM, strength, and functional use of R upper extremity.    Updates/Grading for next session: progress as tolerated      Yvon Jaeger, III,  OTS   I certify that I was present in the room directing the student in service delivery and guiding them using my skilled judgement. As the co-signing therapist, I have reviewed the students documentation and I am responsible for the treatment, assessment, and plan.     NADIA Berger, JULIAT

## 2022-06-02 DIAGNOSIS — E87.6 HYPOKALEMIA: ICD-10-CM

## 2022-06-02 RX ORDER — POTASSIUM CHLORIDE 750 MG/1
TABLET, EXTENDED RELEASE ORAL
Qty: 180 TABLET | Refills: 1 | Status: SHIPPED | OUTPATIENT
Start: 2022-06-02 | End: 2022-11-14 | Stop reason: SDUPTHER

## 2022-06-02 NOTE — TELEPHONE ENCOUNTER
Refill Routing Note   Medication(s) are not appropriate for processing by [facility] for the following reason(s):      - Patient has been seen in the ED/Hospital since the last PCP visit    [facility] action(s):  Defer          Medication reconciliation completed: No     Appointments  past 12m or future 3m with PCP    Date Provider   Last Visit   2/17/2022 Bronwyn Brandt MD   Next Visit   Visit date not found Bronwyn Brandt MD   ED visits in past 90 days: 0        Note composed:9:36 AM 06/02/2022

## 2022-06-02 NOTE — TELEPHONE ENCOUNTER
No new care gaps identified.  [facility] Embedded Care Gaps. Reference number: [id number]. 6/02/2022   3:31:40 AM CDT

## 2022-06-09 NOTE — PROGRESS NOTES
Digital Medicine: Clinician Follow-Up    At last outreach, increased amlodipine to 5mg daily. Patient confirms change and tolerability.     Patient reports minimal relief from carpal tunnel injection. Feels he will likely not see any relief, if none at this point. Has follow-up appt to explore other treatment options.     The history is provided by the patient.   Follow-up reason(s): medication change follow-up.     Hypertension    Patient's blood pressure is stable.   Additional Follow-up details: Repeat readings are often at goal with initial reading above goal. Patient reports that he merely rests between readings. Reviewed technique no issues noted otherwise. Does report a struggle to place device.     Patient did make medication change.    Is patient tolerating med change? yes            Last 5 Patient Entered Readings                                      Current 30 Day Average: 134/86     Recent Readings 11/9/2020 11/9/2020 11/6/2020 11/6/2020 11/6/2020    SBP (mmHg) 129 141 137 127 136    DBP (mmHg) 85 91 82 80 87    Pulse 51 55 63 59 64                 Depression Screening  Did not address depression screening.    Sleep Apnea Screening    Did not address sleep apnea screening.     Medication Affordability Screening  Did not address medication affordability screening.     Medication Adherence-Medication adherence was assessed.          ASSESSMENT(S)  Patients BP average is 134/86 mmHg, which is above goal. Patient's BP goal is less than or equal to 130/80.     BP readings on average remain above goal and stable. Further review shows improvement in repeat readings since titration of amlodipine. Suspect resting longer and placing device prior to resting period will help consistency and accuracy of BP monitoring.       Hypertension Plan  Continue current therapy.  Provided patient education. Educated on rest period before BP.   Will send video for technique.     Will follow-up in 4-6 weeks to monitor.     [facility]    Follow-Up on: BLANCA on CKD stage IV    HPI: Pt seen and examined in room, sitting up in wheelchair denies complaints, PT going well, good uop.      ROS:  General: no fever/chills  CV: no CP  Lung: no SOB, no cough  GI: no N/V/D  : no dysuria  Ext: edema       Current Facility-Administered Medications   Medication Dose Route Frequency    insulin lispro (HUMALOG) injection vial 5 Units  5 Units SubCUTAneous TID     insulin glargine (LANTUS) injection vial 26 Units  26 Units SubCUTAneous Nightly    melatonin tablet 3 mg  3 mg Oral Nightly    ondansetron (ZOFRAN) injection 4 mg  4 mg IntraVENous Q6H PRN    insulin lispro (HUMALOG) injection vial 0-4 Units  0-4 Units SubCUTAneous TID WC    insulin lispro (HUMALOG) injection vial 0-4 Units  0-4 Units SubCUTAneous Nightly    glucose chewable tablet 16 g  4 tablet Oral PRN    dextrose bolus 10% 125 mL  125 mL IntraVENous PRN    Or    dextrose bolus 10% 250 mL  250 mL IntraVENous PRN    glucagon injection 1 mg  1 mg IntraMUSCular PRN    dextrose 5 % solution  100 mL/hr IntraVENous PRN    acetaminophen (TYLENOL) tablet 650 mg  650 mg Oral Q6H PRN    Or    acetaminophen (TYLENOL) suppository 650 mg  650 mg Rectal Q6H PRN    polyethylene glycol (GLYCOLAX) packet 17 g  17 g Oral Daily PRN    albuterol (PROVENTIL) nebulizer solution 2.5 mg  2.5 mg Nebulization Q4H PRN    ferrous sulfate (IRON 325) tablet 325 mg  325 mg Oral BID WC    folic acid (FOLVITE) tablet 1 mg  1 mg Oral Daily    guaiFENesin-dextromethorphan (ROBITUSSIN DM) 100-10 MG/5ML syrup 5 mL  5 mL Oral Q4H PRN    loperamide (IMODIUM) capsule 2 mg  2 mg Oral 4x Daily PRN    medicated lip ointment (BLISTEX)   Topical PRN    ondansetron (ZOFRAN-ODT) disintegrating tablet 4 mg  4 mg Oral Q6H PRN       Exam:  Vitals:    06/08/22 0734 06/08/22 1608 06/08/22 2129 06/09/22 0700   BP: 93/76 131/79 118/75 111/77   Pulse: 95 (!) 101 95 94   Resp:  16 16 16   Temp: 98.6 °F (37 °C) 99.7   Addressed patient questions and patient has my contact information if needed prior to next outreach. Patient verbalizes understanding.             There are no preventive care reminders to display for this patient.  There are no preventive care reminders to display for this patient.      Hypertension Medications             amLODIPine (NORVASC) 5 MG tablet Take 1 tablet (5 mg total) by mouth once daily.    valsartan (DIOVAN) 320 MG tablet Take 1 tablet (320 mg total) by mouth once daily.                 °F (37.6 °C) 98 °F (36.7 °C) 99.2 °F (37.3 °C)   TempSrc:  Oral Oral    SpO2: 100% 98%  98%   Weight:       Height:             Intake/Output Summary (Last 24 hours) at 6/9/2022 0858  Last data filed at 6/9/2022 0357  Gross per 24 hour   Intake 680 ml   Output 300 ml   Net 380 ml     PE:  GEN - in no distress, alert and oriented   CV - regular rate, S1, S2, no rub  Lung - clear bilaterally  Abd - soft, nontender  Ext - +1 BLE edema  Access-  right TCC- intact    Labs  Recent Labs     06/07/22  0501 06/08/22  1123 06/09/22  0452   WBC 7.5 7.3 7.6   HGB 8.6* 9.0* 7.8*   HCT 27.7* 29.6* 25.0*    335 341       Recent Labs     06/07/22  0501 06/08/22  0616 06/09/22  0452    144 146*   K 3.5 3.7 3.6    109* 109*   CO2 30 23 28   BUN 9 9 9   CREATININE 4.40* 4.10* 3.80*   PHOS 3.4  --   --        Problem List:      Issues Addressed By Nephrology:    Plan:  1. BLANCA on CKD stage IV non oliguric   1st HD 5/24, TCC placed 6/1  - dialysis on hold, last HD 6/4  Non oliguric, Creat stable, daily labs    2. Type I DM- SSI per primary     3. DKA BS>2000 on admit- un controlled- SSI per primary     4.  Anemia-  on Fe

## 2022-06-13 ENCOUNTER — HOSPITAL ENCOUNTER (OUTPATIENT)
Dept: RADIOLOGY | Facility: HOSPITAL | Age: 42
Discharge: HOME | End: 2022-06-13
Attending: ORTHOPAEDIC SURGERY
Payer: COMMERCIAL

## 2022-06-13 ENCOUNTER — OFFICE VISIT (OUTPATIENT)
Dept: ORTHOPEDICS | Facility: CLINIC | Age: 42
End: 2022-06-13
Payer: COMMERCIAL

## 2022-06-13 VITALS — WEIGHT: 255 LBS | BODY MASS INDEX: 35.7 KG/M2 | HEIGHT: 71 IN

## 2022-06-13 DIAGNOSIS — M79.642 PAIN IN LEFT HAND: Primary | ICD-10-CM

## 2022-06-13 DIAGNOSIS — M79.642 PAIN IN LEFT HAND: ICD-10-CM

## 2022-06-13 PROCEDURE — 1159F MED LIST DOCD IN RCRD: CPT | Mod: CPTII,S$GLB,, | Performed by: ORTHOPAEDIC SURGERY

## 2022-06-13 PROCEDURE — 73130 X-RAY EXAM OF HAND: CPT | Mod: TC,PO,LT

## 2022-06-13 PROCEDURE — 3044F PR MOST RECENT HEMOGLOBIN A1C LEVEL <7.0%: ICD-10-PCS | Mod: CPTII,S$GLB,, | Performed by: ORTHOPAEDIC SURGERY

## 2022-06-13 PROCEDURE — 4010F PR ACE/ARB THEARPY RXD/TAKEN: ICD-10-PCS | Mod: CPTII,S$GLB,, | Performed by: ORTHOPAEDIC SURGERY

## 2022-06-13 PROCEDURE — 99213 PR OFFICE/OUTPT VISIT, EST, LEVL III, 20-29 MIN: ICD-10-PCS | Mod: 24,S$GLB,, | Performed by: ORTHOPAEDIC SURGERY

## 2022-06-13 PROCEDURE — 99999 PR PBB SHADOW E&M-EST. PATIENT-LVL III: ICD-10-PCS | Mod: PBBFAC,,, | Performed by: ORTHOPAEDIC SURGERY

## 2022-06-13 PROCEDURE — 4010F ACE/ARB THERAPY RXD/TAKEN: CPT | Mod: CPTII,S$GLB,, | Performed by: ORTHOPAEDIC SURGERY

## 2022-06-13 PROCEDURE — 99999 PR PBB SHADOW E&M-EST. PATIENT-LVL III: CPT | Mod: PBBFAC,,, | Performed by: ORTHOPAEDIC SURGERY

## 2022-06-13 PROCEDURE — 1159F PR MEDICATION LIST DOCUMENTED IN MEDICAL RECORD: ICD-10-PCS | Mod: CPTII,S$GLB,, | Performed by: ORTHOPAEDIC SURGERY

## 2022-06-13 PROCEDURE — 73130 X-RAY EXAM OF HAND: CPT | Mod: 26,LT,, | Performed by: RADIOLOGY

## 2022-06-13 PROCEDURE — 3044F HG A1C LEVEL LT 7.0%: CPT | Mod: CPTII,S$GLB,, | Performed by: ORTHOPAEDIC SURGERY

## 2022-06-13 PROCEDURE — 3008F PR BODY MASS INDEX (BMI) DOCUMENTED: ICD-10-PCS | Mod: CPTII,S$GLB,, | Performed by: ORTHOPAEDIC SURGERY

## 2022-06-13 PROCEDURE — 73130 XR HAND COMPLETE 3 VIEW LEFT: ICD-10-PCS | Mod: 26,LT,, | Performed by: RADIOLOGY

## 2022-06-13 PROCEDURE — 3008F BODY MASS INDEX DOCD: CPT | Mod: CPTII,S$GLB,, | Performed by: ORTHOPAEDIC SURGERY

## 2022-06-13 PROCEDURE — 99024 PR POST-OP FOLLOW-UP VISIT: ICD-10-PCS | Mod: S$GLB,,, | Performed by: ORTHOPAEDIC SURGERY

## 2022-06-13 PROCEDURE — 99024 POSTOP FOLLOW-UP VISIT: CPT | Mod: S$GLB,,, | Performed by: ORTHOPAEDIC SURGERY

## 2022-06-13 PROCEDURE — 99213 OFFICE O/P EST LOW 20 MIN: CPT | Mod: 24,S$GLB,, | Performed by: ORTHOPAEDIC SURGERY

## 2022-06-13 RX ORDER — MELOXICAM 15 MG/1
15 TABLET ORAL DAILY
Qty: 30 TABLET | Refills: 0 | Status: SHIPPED | OUTPATIENT
Start: 2022-06-13 | End: 2022-08-19

## 2022-06-13 NOTE — PROGRESS NOTES
Mr Chen returns to clinic today.  He has a history of left thumb base pain.  He states that this has been going on for couple of weeks.  He states that activities such as typing and pinching reproduce his pain.  He was concerned about recurrence of carpal tunnel.  He is here today for evaluation and treatment    Physical exam:  Examination left hand reveals that there is no edema.  Has a well-healed incision over the carpal tunnel.  Sensation is intact in the median radial ulnar distribution.  He is able make a full composite fist and fully extend the fingers.  Palpation does produce tenderness directly over the thumb CMC joint.    Radiology:  X-rays of the left hand were taken in clinic today.  The films have been reviewed by me.  There are very mild degenerative changes at the base of the thumb at the CMC joint    Assessment:  Left thumb CMC are early arthritis and repetitive use injury    Plan:    1. Will start him on Mobic 15 mg per day    2. Will provide him with a thumb spica splint to wear for activity    3. Will follow up with me on a p.r.n. basis

## 2022-06-29 ENCOUNTER — PATIENT MESSAGE (OUTPATIENT)
Dept: ADMINISTRATIVE | Facility: OTHER | Age: 42
End: 2022-06-29
Payer: COMMERCIAL

## 2022-07-25 ENCOUNTER — PATIENT MESSAGE (OUTPATIENT)
Dept: FAMILY MEDICINE | Facility: CLINIC | Age: 42
End: 2022-07-25
Payer: COMMERCIAL

## 2022-07-25 DIAGNOSIS — I10 ESSENTIAL HYPERTENSION: Primary | ICD-10-CM

## 2022-07-26 NOTE — TELEPHONE ENCOUNTER
Please review labs done 1/24/22. It pt due for repeat labs, please order. Will contact pt to schedule.

## 2022-07-29 ENCOUNTER — LAB VISIT (OUTPATIENT)
Dept: LAB | Facility: HOSPITAL | Age: 42
End: 2022-07-29
Payer: COMMERCIAL

## 2022-07-29 DIAGNOSIS — I10 ESSENTIAL HYPERTENSION: ICD-10-CM

## 2022-07-29 LAB
ANION GAP SERPL CALC-SCNC: 11 MMOL/L (ref 8–16)
BUN SERPL-MCNC: 16 MG/DL (ref 6–20)
CALCIUM SERPL-MCNC: 9.2 MG/DL (ref 8.7–10.5)
CHLORIDE SERPL-SCNC: 107 MMOL/L (ref 95–110)
CO2 SERPL-SCNC: 23 MMOL/L (ref 23–29)
CREAT SERPL-MCNC: 0.9 MG/DL (ref 0.5–1.4)
EST. GFR  (AFRICAN AMERICAN): >60 ML/MIN/1.73 M^2
EST. GFR  (NON AFRICAN AMERICAN): >60 ML/MIN/1.73 M^2
GLUCOSE SERPL-MCNC: 116 MG/DL (ref 70–110)
POTASSIUM SERPL-SCNC: 3.7 MMOL/L (ref 3.5–5.1)
SODIUM SERPL-SCNC: 141 MMOL/L (ref 136–145)

## 2022-07-29 PROCEDURE — 80048 BASIC METABOLIC PNL TOTAL CA: CPT | Performed by: INTERNAL MEDICINE

## 2022-07-29 PROCEDURE — 36415 COLL VENOUS BLD VENIPUNCTURE: CPT | Mod: PN | Performed by: INTERNAL MEDICINE

## 2022-08-19 ENCOUNTER — OFFICE VISIT (OUTPATIENT)
Dept: FAMILY MEDICINE | Facility: CLINIC | Age: 42
End: 2022-08-19
Payer: COMMERCIAL

## 2022-08-19 VITALS
SYSTOLIC BLOOD PRESSURE: 138 MMHG | OXYGEN SATURATION: 98 % | HEART RATE: 59 BPM | BODY MASS INDEX: 36.22 KG/M2 | WEIGHT: 258.69 LBS | DIASTOLIC BLOOD PRESSURE: 80 MMHG | HEIGHT: 71 IN

## 2022-08-19 DIAGNOSIS — Z00.00 PREVENTATIVE HEALTH CARE: ICD-10-CM

## 2022-08-19 DIAGNOSIS — I10 HYPERTENSION, UNSPECIFIED TYPE: Primary | ICD-10-CM

## 2022-08-19 DIAGNOSIS — R53.83 LOW ENERGY: ICD-10-CM

## 2022-08-19 DIAGNOSIS — N52.9 ERECTILE DYSFUNCTION, UNSPECIFIED ERECTILE DYSFUNCTION TYPE: ICD-10-CM

## 2022-08-19 PROCEDURE — 99214 OFFICE O/P EST MOD 30 MIN: CPT | Mod: S$GLB,,, | Performed by: INTERNAL MEDICINE

## 2022-08-19 PROCEDURE — 3079F DIAST BP 80-89 MM HG: CPT | Mod: CPTII,S$GLB,, | Performed by: INTERNAL MEDICINE

## 2022-08-19 PROCEDURE — 1159F MED LIST DOCD IN RCRD: CPT | Mod: CPTII,S$GLB,, | Performed by: INTERNAL MEDICINE

## 2022-08-19 PROCEDURE — 99999 PR PBB SHADOW E&M-EST. PATIENT-LVL III: CPT | Mod: PBBFAC,,, | Performed by: INTERNAL MEDICINE

## 2022-08-19 PROCEDURE — 4010F ACE/ARB THERAPY RXD/TAKEN: CPT | Mod: CPTII,S$GLB,, | Performed by: INTERNAL MEDICINE

## 2022-08-19 PROCEDURE — 99214 PR OFFICE/OUTPT VISIT, EST, LEVL IV, 30-39 MIN: ICD-10-PCS | Mod: S$GLB,,, | Performed by: INTERNAL MEDICINE

## 2022-08-19 PROCEDURE — 3008F BODY MASS INDEX DOCD: CPT | Mod: CPTII,S$GLB,, | Performed by: INTERNAL MEDICINE

## 2022-08-19 PROCEDURE — 1160F PR REVIEW ALL MEDS BY PRESCRIBER/CLIN PHARMACIST DOCUMENTED: ICD-10-PCS | Mod: CPTII,S$GLB,, | Performed by: INTERNAL MEDICINE

## 2022-08-19 PROCEDURE — 3075F SYST BP GE 130 - 139MM HG: CPT | Mod: CPTII,S$GLB,, | Performed by: INTERNAL MEDICINE

## 2022-08-19 PROCEDURE — 1159F PR MEDICATION LIST DOCUMENTED IN MEDICAL RECORD: ICD-10-PCS | Mod: CPTII,S$GLB,, | Performed by: INTERNAL MEDICINE

## 2022-08-19 PROCEDURE — 4010F PR ACE/ARB THEARPY RXD/TAKEN: ICD-10-PCS | Mod: CPTII,S$GLB,, | Performed by: INTERNAL MEDICINE

## 2022-08-19 PROCEDURE — 3044F HG A1C LEVEL LT 7.0%: CPT | Mod: CPTII,S$GLB,, | Performed by: INTERNAL MEDICINE

## 2022-08-19 PROCEDURE — 1160F RVW MEDS BY RX/DR IN RCRD: CPT | Mod: CPTII,S$GLB,, | Performed by: INTERNAL MEDICINE

## 2022-08-19 PROCEDURE — 99999 PR PBB SHADOW E&M-EST. PATIENT-LVL III: ICD-10-PCS | Mod: PBBFAC,,, | Performed by: INTERNAL MEDICINE

## 2022-08-19 PROCEDURE — 3079F PR MOST RECENT DIASTOLIC BLOOD PRESSURE 80-89 MM HG: ICD-10-PCS | Mod: CPTII,S$GLB,, | Performed by: INTERNAL MEDICINE

## 2022-08-19 PROCEDURE — 3075F PR MOST RECENT SYSTOLIC BLOOD PRESS GE 130-139MM HG: ICD-10-PCS | Mod: CPTII,S$GLB,, | Performed by: INTERNAL MEDICINE

## 2022-08-19 PROCEDURE — 3008F PR BODY MASS INDEX (BMI) DOCUMENTED: ICD-10-PCS | Mod: CPTII,S$GLB,, | Performed by: INTERNAL MEDICINE

## 2022-08-19 PROCEDURE — 3044F PR MOST RECENT HEMOGLOBIN A1C LEVEL <7.0%: ICD-10-PCS | Mod: CPTII,S$GLB,, | Performed by: INTERNAL MEDICINE

## 2022-08-19 RX ORDER — AMLODIPINE BESYLATE 5 MG/1
5 TABLET ORAL DAILY
Qty: 90 TABLET | Refills: 1 | Status: SHIPPED | OUTPATIENT
Start: 2022-08-19 | End: 2023-02-15 | Stop reason: SDUPTHER

## 2022-08-19 RX ORDER — VALSARTAN 320 MG/1
320 TABLET ORAL DAILY
Qty: 90 TABLET | Refills: 1 | Status: SHIPPED | OUTPATIENT
Start: 2022-08-19 | End: 2023-02-15 | Stop reason: SDUPTHER

## 2022-08-19 NOTE — PROGRESS NOTES
Assessment and Plan:    1. Hypertension, unspecified type  Controlled, continue current medications.  - amLODIPine (NORVASC) 5 MG tablet; Take 1 tablet (5 mg total) by mouth once daily.  Dispense: 90 tablet; Refill: 1  - valsartan (DIOVAN) 320 MG tablet; Take 1 tablet (320 mg total) by mouth once daily.  Dispense: 90 tablet; Refill: 1    2. Low energy  - Testosterone; Future    3. Erectile dysfunction, unspecified erectile dysfunction type  - Testosterone; Future    4. Preventative health care  Labs before annual in 6 months  - Lipid Panel; Future  - Hemoglobin A1C; Future  - CBC Auto Differential; Future  - Comprehensive Metabolic Panel; Future  - TSH; Future  - PSA, SCREENING; Future    ______________________________________________________________________  Subjective:    Chief Complaint:  Follow up chronic medical conditions.    HPI:  Chad is a 42 y.o. year old male here to follow up chronic medical conditions.     He has continued to get plenty of exercise, has mainly been walking. Working on increasing resistance so he can keep his heart rate up.     BP at home has been 115-125/65-75 at home.    He had eaten about 30 minutes before the labs. Glucose was 116, but last A1c normal.     He reports that he would like to start getting PSA tests with yearly labs.     He would like to get his testosterone checked. Has been having some low energy and occasional ED. Does not feel like the ED is frequent or severe enough that he wants to take medication.    Medications:  Current Outpatient Medications on File Prior to Visit   Medication Sig Dispense Refill    amLODIPine (NORVASC) 5 MG tablet Take 1 tablet (5 mg total) by mouth once daily. 90 tablet 1    levocetirizine (XYZAL) 5 MG tablet Take 5 mg by mouth every evening.      meloxicam (MOBIC) 15 MG tablet Take 1 tablet (15 mg total) by mouth once daily. 30 tablet 0    potassium chloride (KLOR-CON) 10 MEQ TbSR TAKE 1 TABLET(10 MEQ) BY MOUTH TWICE DAILY 180 tablet 1     "PROAIR RESPICLICK 90 mcg/actuation inhaler Inhale 2 puffs into the lungs every 6 (six) hours as needed for Wheezing or Shortness of Breath. 1 each 1    valsartan (DIOVAN) 320 MG tablet Take 1 tablet (320 mg total) by mouth once daily. 90 tablet 1    ibuprofen (ADVIL,MOTRIN) 800 MG tablet Take 1 tablet (800 mg total) by mouth every 6 (six) hours as needed for Pain. 8 tablet 0    om 3/E/linol/ala/oleic/gla/lip (OMEGA 3-6-9 ORAL) Take 1 capsule by mouth once daily.      sumatriptan (IMITREX) 50 MG tablet Take 1 tablet (50 mg total) by mouth daily as needed for Migraine. (Patient not taking: Reported on 8/19/2022) 9 tablet 1     No current facility-administered medications on file prior to visit.       Review of Systems:  Review of Systems   Constitutional: Negative for chills and fever.   Respiratory: Negative for shortness of breath and wheezing.    Cardiovascular: Negative for chest pain and palpitations.   Musculoskeletal: Negative for neck pain.   Neurological: Negative for headaches.     Entered by patient and reviewed and updated during visit      Past Medical History:  Past Medical History:   Diagnosis Date    Asthma     exercise induced    Cardiac murmur     Dyslipidemia     Hypertension     Obesity     PONV (postoperative nausea and vomiting)     Seasonal and perennial allergic rhinitis        Objective:    Vitals:  Vitals:    08/19/22 0839   BP: 138/80   Pulse: (!) 59   SpO2: 98%   Weight: 117.4 kg (258 lb 11.4 oz)   Height: 5' 11" (1.803 m)   PainSc: 0-No pain       Physical Exam  Vitals reviewed.   Constitutional:       General: He is not in acute distress.     Appearance: He is well-developed.   Eyes:      Conjunctiva/sclera: Conjunctivae normal.   Cardiovascular:      Rate and Rhythm: Normal rate and regular rhythm.   Pulmonary:      Effort: Pulmonary effort is normal. No respiratory distress.   Skin:     General: Skin is warm and dry.   Neurological:      Mental Status: He is alert and " oriented to person, place, and time.   Psychiatric:         Behavior: Behavior normal.         Data:  K has been consistently normal recently  Gluc 116, non-fasting    Bronwyn Brandt MD  Internal Medicine

## 2022-09-27 ENCOUNTER — PATIENT MESSAGE (OUTPATIENT)
Dept: ADMINISTRATIVE | Facility: OTHER | Age: 42
End: 2022-09-27
Payer: COMMERCIAL

## 2022-09-29 ENCOUNTER — DOCUMENTATION ONLY (OUTPATIENT)
Dept: REHABILITATION | Facility: HOSPITAL | Age: 42
End: 2022-09-29
Payer: COMMERCIAL

## 2022-09-29 DIAGNOSIS — J45.990 EXERCISE-INDUCED ASTHMA: ICD-10-CM

## 2022-09-29 PROBLEM — M79.641 PAIN OF RIGHT HAND: Status: RESOLVED | Noted: 2022-05-09 | Resolved: 2022-09-29

## 2022-09-29 PROBLEM — M25.641 DECREASED RANGE OF MOTION OF FINGER OF RIGHT HAND: Status: RESOLVED | Noted: 2022-05-09 | Resolved: 2022-09-29

## 2022-09-29 PROBLEM — Z78.9 DECREASED ACTIVITIES OF DAILY LIVING (ADL): Status: RESOLVED | Noted: 2022-05-09 | Resolved: 2022-09-29

## 2022-09-29 PROBLEM — R29.898 DECREASED GRIP STRENGTH OF RIGHT HAND: Status: RESOLVED | Noted: 2022-05-09 | Resolved: 2022-09-29

## 2022-09-29 PROBLEM — R29.898 DECREASED PINCH STRENGTH: Status: RESOLVED | Noted: 2022-05-09 | Resolved: 2022-09-29

## 2022-09-29 RX ORDER — ALBUTEROL SULFATE 90 UG/1
2 POWDER, METERED RESPIRATORY (INHALATION) EVERY 6 HOURS PRN
Qty: 1 EACH | Refills: 1 | Status: SHIPPED | OUTPATIENT
Start: 2022-09-29 | End: 2023-09-05 | Stop reason: SDUPTHER

## 2022-09-29 NOTE — PROGRESS NOTES
Occupational Therapy Discharge Note    22    Pt was referred by Keo Powers PA-C and seen for initial evaluation on 22 for R MF TFR. Pt was seen for 6  OT visits for treatment of R hand pain, decreased ROM R MF, decreased /pinch/ADL. Please see last note, dated 22,  for last objective measures as well as goal achievements/modifications. Pt has not returned to therapy and pt's POC has  22. Current status is not known.     Discharge OT services to [facility]    NADIA Berger CHT

## 2022-09-29 NOTE — TELEPHONE ENCOUNTER
No new care gaps identified.  [facility] Embedded Care Gaps. Reference number: [id number]. 9/29/2022   2:30:54 PM CDT

## 2022-11-14 ENCOUNTER — OFFICE VISIT (OUTPATIENT)
Dept: ORTHOPEDICS | Facility: CLINIC | Age: 42
End: 2022-11-14
Payer: COMMERCIAL

## 2022-11-14 DIAGNOSIS — M18.12 ARTHRITIS OF CARPOMETACARPAL (CMC) JOINT OF LEFT THUMB: Primary | ICD-10-CM

## 2022-11-14 DIAGNOSIS — E87.6 HYPOKALEMIA: ICD-10-CM

## 2022-11-14 PROCEDURE — 99213 OFFICE O/P EST LOW 20 MIN: CPT | Mod: S$PBB,,, | Performed by: ORTHOPAEDIC SURGERY

## 2022-11-14 PROCEDURE — 4010F ACE/ARB THERAPY RXD/TAKEN: CPT | Mod: ,,, | Performed by: ORTHOPAEDIC SURGERY

## 2022-11-14 PROCEDURE — 99999 PR PBB SHADOW E&M-EST. PATIENT-LVL II: ICD-10-PCS | Mod: PBBFAC,,, | Performed by: ORTHOPAEDIC SURGERY

## 2022-11-14 PROCEDURE — 99213 PR OFFICE/OUTPT VISIT, EST, LEVL III, 20-29 MIN: ICD-10-PCS | Mod: S$PBB,,, | Performed by: ORTHOPAEDIC SURGERY

## 2022-11-14 PROCEDURE — 4010F PR ACE/ARB THEARPY RXD/TAKEN: ICD-10-PCS | Mod: ,,, | Performed by: ORTHOPAEDIC SURGERY

## 2022-11-14 PROCEDURE — 99999 PR PBB SHADOW E&M-EST. PATIENT-LVL II: CPT | Mod: PBBFAC,,, | Performed by: ORTHOPAEDIC SURGERY

## 2022-11-14 RX ORDER — MELOXICAM 15 MG/1
15 TABLET ORAL DAILY
Qty: 30 TABLET | Refills: 0 | Status: SHIPPED | OUTPATIENT
Start: 2022-11-14 | End: 2023-02-15

## 2022-11-14 NOTE — PROGRESS NOTES
Mr Chen returns to clinic today.  He has a continued complaint of left thumb pain.  He states that last time the splinting and the Mobic did improve his pain.  He states that he is been using his hand and is now having pain at the base of the thumb again     Physical exam: Examination left hand and thumb reveals that there is no edema.  There are no major skin changes but he does have hypopigmentation over the area where he wears his watch.  Palpation does not produce tenderness over the 1st extensor compartment but he does have tenderness over the CMC joint.  Has a mildly positive grind test.  He does have sensation intact and capillary refill is less than 2 seconds     Radiology:  X-rays of the left hand from 06/13/2022 has been reviewed.  There is very mild degenerative change about the CMC joint without other significant pathology     Assessment:  Left thumb very mild CMC arthritis     Plan:    1.  I will give him the information to obtain a meta  splint    2. I will resume Mobic.  If he runs out of this prescription I can authorize a refill at some point the future    3. Will follow up on a p.r.n. basis

## 2022-11-15 RX ORDER — POTASSIUM CHLORIDE 750 MG/1
10 TABLET, EXTENDED RELEASE ORAL 2 TIMES DAILY
Qty: 180 TABLET | Refills: 1 | Status: SHIPPED | OUTPATIENT
Start: 2022-11-15 | End: 2023-08-20

## 2022-12-16 ENCOUNTER — PATIENT MESSAGE (OUTPATIENT)
Dept: FAMILY MEDICINE | Facility: CLINIC | Age: 42
End: 2022-12-16

## 2023-01-24 ENCOUNTER — PATIENT MESSAGE (OUTPATIENT)
Dept: ADMINISTRATIVE | Facility: HOSPITAL | Age: 43
End: 2023-01-24
Payer: COMMERCIAL

## 2023-01-24 DIAGNOSIS — Z12.11 SCREENING FOR COLON CANCER: ICD-10-CM

## 2023-02-01 ENCOUNTER — PATIENT MESSAGE (OUTPATIENT)
Dept: ADMINISTRATIVE | Facility: HOSPITAL | Age: 43
End: 2023-02-01
Payer: COMMERCIAL

## 2023-02-01 ENCOUNTER — LAB VISIT (OUTPATIENT)
Dept: LAB | Facility: HOSPITAL | Age: 43
End: 2023-02-01
Attending: INTERNAL MEDICINE
Payer: COMMERCIAL

## 2023-02-01 ENCOUNTER — PATIENT OUTREACH (OUTPATIENT)
Dept: ADMINISTRATIVE | Facility: HOSPITAL | Age: 43
End: 2023-02-01
Payer: COMMERCIAL

## 2023-02-01 DIAGNOSIS — R53.83 LOW ENERGY: ICD-10-CM

## 2023-02-01 DIAGNOSIS — N52.9 ERECTILE DYSFUNCTION, UNSPECIFIED ERECTILE DYSFUNCTION TYPE: ICD-10-CM

## 2023-02-01 DIAGNOSIS — Z00.00 PREVENTATIVE HEALTH CARE: ICD-10-CM

## 2023-02-01 LAB
ALBUMIN SERPL BCP-MCNC: 3.8 G/DL (ref 3.5–5.2)
ALP SERPL-CCNC: 82 U/L (ref 55–135)
ALT SERPL W/O P-5'-P-CCNC: 32 U/L (ref 10–44)
ANION GAP SERPL CALC-SCNC: 12 MMOL/L (ref 8–16)
AST SERPL-CCNC: 22 U/L (ref 10–40)
BASOPHILS # BLD AUTO: 0.02 K/UL (ref 0–0.2)
BASOPHILS NFR BLD: 0.3 % (ref 0–1.9)
BILIRUB SERPL-MCNC: 0.5 MG/DL (ref 0.1–1)
BUN SERPL-MCNC: 16 MG/DL (ref 6–20)
CALCIUM SERPL-MCNC: 9 MG/DL (ref 8.7–10.5)
CHLORIDE SERPL-SCNC: 108 MMOL/L (ref 95–110)
CHOLEST SERPL-MCNC: 196 MG/DL (ref 120–199)
CHOLEST/HDLC SERPL: 5.9 {RATIO} (ref 2–5)
CO2 SERPL-SCNC: 21 MMOL/L (ref 23–29)
COMPLEXED PSA SERPL-MCNC: 0.37 NG/ML (ref 0–4)
CREAT SERPL-MCNC: 0.9 MG/DL (ref 0.5–1.4)
DIFFERENTIAL METHOD: ABNORMAL
EOSINOPHIL # BLD AUTO: 0.3 K/UL (ref 0–0.5)
EOSINOPHIL NFR BLD: 3.6 % (ref 0–8)
ERYTHROCYTE [DISTWIDTH] IN BLOOD BY AUTOMATED COUNT: 13.3 % (ref 11.5–14.5)
EST. GFR  (NO RACE VARIABLE): >60 ML/MIN/1.73 M^2
ESTIMATED AVG GLUCOSE: 105 MG/DL (ref 68–131)
GLUCOSE SERPL-MCNC: 101 MG/DL (ref 70–110)
HBA1C MFR BLD: 5.3 % (ref 4–5.6)
HCT VFR BLD AUTO: 41.4 % (ref 40–54)
HDLC SERPL-MCNC: 33 MG/DL (ref 40–75)
HDLC SERPL: 16.8 % (ref 20–50)
HGB BLD-MCNC: 13.7 G/DL (ref 14–18)
IMM GRANULOCYTES # BLD AUTO: 0.01 K/UL (ref 0–0.04)
IMM GRANULOCYTES NFR BLD AUTO: 0.1 % (ref 0–0.5)
LDLC SERPL CALC-MCNC: 134.8 MG/DL (ref 63–159)
LYMPHOCYTES # BLD AUTO: 2.1 K/UL (ref 1–4.8)
LYMPHOCYTES NFR BLD: 29.4 % (ref 18–48)
MCH RBC QN AUTO: 28.3 PG (ref 27–31)
MCHC RBC AUTO-ENTMCNC: 33.1 G/DL (ref 32–36)
MCV RBC AUTO: 86 FL (ref 82–98)
MONOCYTES # BLD AUTO: 0.6 K/UL (ref 0.3–1)
MONOCYTES NFR BLD: 8.6 % (ref 4–15)
NEUTROPHILS # BLD AUTO: 4.1 K/UL (ref 1.8–7.7)
NEUTROPHILS NFR BLD: 58 % (ref 38–73)
NONHDLC SERPL-MCNC: 163 MG/DL
NRBC BLD-RTO: 0 /100 WBC
PLATELET # BLD AUTO: 290 K/UL (ref 150–450)
PMV BLD AUTO: 10 FL (ref 9.2–12.9)
POTASSIUM SERPL-SCNC: 3.5 MMOL/L (ref 3.5–5.1)
PROT SERPL-MCNC: 6.9 G/DL (ref 6–8.4)
RBC # BLD AUTO: 4.84 M/UL (ref 4.6–6.2)
SODIUM SERPL-SCNC: 141 MMOL/L (ref 136–145)
TESTOST SERPL-MCNC: 280 NG/DL (ref 304–1227)
TRIGL SERPL-MCNC: 141 MG/DL (ref 30–150)
TSH SERPL DL<=0.005 MIU/L-ACNC: 1.35 UIU/ML (ref 0.4–4)
WBC # BLD AUTO: 7 K/UL (ref 3.9–12.7)

## 2023-02-01 PROCEDURE — 36415 COLL VENOUS BLD VENIPUNCTURE: CPT | Mod: PN | Performed by: INTERNAL MEDICINE

## 2023-02-01 PROCEDURE — 84443 ASSAY THYROID STIM HORMONE: CPT | Performed by: INTERNAL MEDICINE

## 2023-02-01 PROCEDURE — 83036 HEMOGLOBIN GLYCOSYLATED A1C: CPT | Performed by: INTERNAL MEDICINE

## 2023-02-01 PROCEDURE — 84153 ASSAY OF PSA TOTAL: CPT | Performed by: INTERNAL MEDICINE

## 2023-02-01 PROCEDURE — 80053 COMPREHEN METABOLIC PANEL: CPT | Performed by: INTERNAL MEDICINE

## 2023-02-01 PROCEDURE — 84403 ASSAY OF TOTAL TESTOSTERONE: CPT | Performed by: INTERNAL MEDICINE

## 2023-02-01 PROCEDURE — 85025 COMPLETE CBC W/AUTO DIFF WBC: CPT | Performed by: INTERNAL MEDICINE

## 2023-02-01 PROCEDURE — 80061 LIPID PANEL: CPT | Performed by: INTERNAL MEDICINE

## 2023-02-01 NOTE — PROGRESS NOTES
2023 Care Everywhere updates requested and reviewed.  Immunizations reconciled. Media reports reviewed.  Duplicate HM overrides and  orders removed.  Overdue HM topic chart audit and/or requested.  Overdue lab testing linked to upcoming lab appointments if applies.    Health Maintenance Due   Topic Date Due    Colorectal Cancer Screening  Never done

## 2023-02-15 ENCOUNTER — OFFICE VISIT (OUTPATIENT)
Dept: FAMILY MEDICINE | Facility: CLINIC | Age: 43
End: 2023-02-15
Payer: COMMERCIAL

## 2023-02-15 VITALS
DIASTOLIC BLOOD PRESSURE: 78 MMHG | BODY MASS INDEX: 37.2 KG/M2 | HEIGHT: 71 IN | OXYGEN SATURATION: 97 % | WEIGHT: 265.75 LBS | HEART RATE: 62 BPM | SYSTOLIC BLOOD PRESSURE: 138 MMHG

## 2023-02-15 DIAGNOSIS — Z00.00 PREVENTATIVE HEALTH CARE: Primary | ICD-10-CM

## 2023-02-15 DIAGNOSIS — E66.01 CLASS 2 SEVERE OBESITY DUE TO EXCESS CALORIES WITH SERIOUS COMORBIDITY AND BODY MASS INDEX (BMI) OF 39.0 TO 39.9 IN ADULT: ICD-10-CM

## 2023-02-15 DIAGNOSIS — N52.9 ERECTILE DYSFUNCTION, UNSPECIFIED ERECTILE DYSFUNCTION TYPE: ICD-10-CM

## 2023-02-15 DIAGNOSIS — E29.1 HYPOGONADISM IN MALE: ICD-10-CM

## 2023-02-15 DIAGNOSIS — I10 HYPERTENSION, UNSPECIFIED TYPE: ICD-10-CM

## 2023-02-15 PROCEDURE — 3075F SYST BP GE 130 - 139MM HG: CPT | Mod: CPTII,S$GLB,, | Performed by: INTERNAL MEDICINE

## 2023-02-15 PROCEDURE — 99213 OFFICE O/P EST LOW 20 MIN: CPT | Mod: 25,S$GLB,, | Performed by: INTERNAL MEDICINE

## 2023-02-15 PROCEDURE — 99396 PREV VISIT EST AGE 40-64: CPT | Mod: S$GLB,,, | Performed by: INTERNAL MEDICINE

## 2023-02-15 PROCEDURE — 1159F PR MEDICATION LIST DOCUMENTED IN MEDICAL RECORD: ICD-10-PCS | Mod: CPTII,S$GLB,, | Performed by: INTERNAL MEDICINE

## 2023-02-15 PROCEDURE — 4010F ACE/ARB THERAPY RXD/TAKEN: CPT | Mod: CPTII,S$GLB,, | Performed by: INTERNAL MEDICINE

## 2023-02-15 PROCEDURE — 3008F PR BODY MASS INDEX (BMI) DOCUMENTED: ICD-10-PCS | Mod: CPTII,S$GLB,, | Performed by: INTERNAL MEDICINE

## 2023-02-15 PROCEDURE — 99999 PR PBB SHADOW E&M-EST. PATIENT-LVL III: CPT | Mod: PBBFAC,,, | Performed by: INTERNAL MEDICINE

## 2023-02-15 PROCEDURE — 99396 PR PREVENTIVE VISIT,EST,40-64: ICD-10-PCS | Mod: S$GLB,,, | Performed by: INTERNAL MEDICINE

## 2023-02-15 PROCEDURE — 1159F MED LIST DOCD IN RCRD: CPT | Mod: CPTII,S$GLB,, | Performed by: INTERNAL MEDICINE

## 2023-02-15 PROCEDURE — 99999 PR PBB SHADOW E&M-EST. PATIENT-LVL III: ICD-10-PCS | Mod: PBBFAC,,, | Performed by: INTERNAL MEDICINE

## 2023-02-15 PROCEDURE — 3008F BODY MASS INDEX DOCD: CPT | Mod: CPTII,S$GLB,, | Performed by: INTERNAL MEDICINE

## 2023-02-15 PROCEDURE — 4010F PR ACE/ARB THEARPY RXD/TAKEN: ICD-10-PCS | Mod: CPTII,S$GLB,, | Performed by: INTERNAL MEDICINE

## 2023-02-15 PROCEDURE — 3078F PR MOST RECENT DIASTOLIC BLOOD PRESSURE < 80 MM HG: ICD-10-PCS | Mod: CPTII,S$GLB,, | Performed by: INTERNAL MEDICINE

## 2023-02-15 PROCEDURE — 3044F PR MOST RECENT HEMOGLOBIN A1C LEVEL <7.0%: ICD-10-PCS | Mod: CPTII,S$GLB,, | Performed by: INTERNAL MEDICINE

## 2023-02-15 PROCEDURE — 3044F HG A1C LEVEL LT 7.0%: CPT | Mod: CPTII,S$GLB,, | Performed by: INTERNAL MEDICINE

## 2023-02-15 PROCEDURE — 99213 PR OFFICE/OUTPT VISIT, EST, LEVL III, 20-29 MIN: ICD-10-PCS | Mod: 25,S$GLB,, | Performed by: INTERNAL MEDICINE

## 2023-02-15 PROCEDURE — 1160F PR REVIEW ALL MEDS BY PRESCRIBER/CLIN PHARMACIST DOCUMENTED: ICD-10-PCS | Mod: CPTII,S$GLB,, | Performed by: INTERNAL MEDICINE

## 2023-02-15 PROCEDURE — 1160F RVW MEDS BY RX/DR IN RCRD: CPT | Mod: CPTII,S$GLB,, | Performed by: INTERNAL MEDICINE

## 2023-02-15 PROCEDURE — 3075F PR MOST RECENT SYSTOLIC BLOOD PRESS GE 130-139MM HG: ICD-10-PCS | Mod: CPTII,S$GLB,, | Performed by: INTERNAL MEDICINE

## 2023-02-15 PROCEDURE — 3078F DIAST BP <80 MM HG: CPT | Mod: CPTII,S$GLB,, | Performed by: INTERNAL MEDICINE

## 2023-02-15 RX ORDER — SILDENAFIL 50 MG/1
50 TABLET, FILM COATED ORAL DAILY PRN
Qty: 30 TABLET | Refills: 0 | Status: SHIPPED | OUTPATIENT
Start: 2023-02-15 | End: 2023-12-28 | Stop reason: SDUPTHER

## 2023-02-15 RX ORDER — VALSARTAN 320 MG/1
320 TABLET ORAL DAILY
Qty: 90 TABLET | Refills: 1 | Status: SHIPPED | OUTPATIENT
Start: 2023-02-15 | End: 2023-11-21

## 2023-02-15 RX ORDER — AMLODIPINE BESYLATE 5 MG/1
5 TABLET ORAL DAILY
Qty: 90 TABLET | Refills: 1 | Status: SHIPPED | OUTPATIENT
Start: 2023-02-15 | End: 2023-11-21

## 2023-02-15 NOTE — PROGRESS NOTES
Assessment and Plan:    1. Preventative health care  Discussed age appropriate preventative healthcare items such as cancer screenings and recommended immunizations. Discussed whether patient is using tobacco, alcohol, or illicit drugs and any concerns were discussed. Discussed maintenance of a healthy weight. Patient queried if he has any additional questions about preventative healthcare and all questions were answered.    2. Class 2 severe obesity due to excess calories with serious comorbidity and body mass index (BMI) of 39.0 to 39.9 in adult  Discussed weight management in detail. Discussed more lower carb vegetable and fruit choices.     3. Erectile dysfunction, unspecified erectile dysfunction type  - sildenafiL (VIAGRA) 50 MG tablet; Take 1 tablet (50 mg total) by mouth daily as needed for Erectile Dysfunction.  Dispense: 30 tablet; Refill: 0    4. Hypertension, unspecified type  - amLODIPine (NORVASC) 5 MG tablet; Take 1 tablet (5 mg total) by mouth once daily.  Dispense: 90 tablet; Refill: 1  - valsartan (DIOVAN) 320 MG tablet; Take 1 tablet (320 mg total) by mouth once daily.  Dispense: 90 tablet; Refill: 1    5. Hypogonadism in male  Discussed options. Suspect this is related to him being overweight and would improve with weight loss and more exercise. He would prefer to work on these lifestyle changes rather than start testosterone supplementation at this time, mainly due to concerns about suppressing natural testosterone production.    Advised that if he does not get a result on the portal of the [employer] (that he reports he mailed 16 days ago) within the next 2 weeks, we will need to repeat this.  ______________________________________________________________________    Subjective:    Chief Complaint:  Annual exam    HPI:  Chad is a 43 y.o. year old patient here for annual exam.     HTN- Controlled with valsartan and amlodipine.     Dyslipidemia- He had not been exercising as much in the few months  prior to the labs being drawn. Does not eat a lot of vegetables.     Testosterone- Low on recent lab. Has mainly been bothered by erectile dysfunction and some fatigue.     Past Medical History:  Past Medical History:   Diagnosis Date    Asthma     exercise induced    Cardiac murmur     Dyslipidemia     Hypertension     Obesity     PONV (postoperative nausea and vomiting)     Seasonal and perennial allergic rhinitis        Past Surgical History:  Past Surgical History:   Procedure Laterality Date    ADENOIDECTOMY  2004    CARPAL TUNNEL RELEASE Left 2020    Procedure: RELEASE, CARPAL TUNNEL;  Surgeon: Dre Montanez MD;  Location: [facility] OR;  Service: Orthopedics;  Laterality: Left;  Procedure:  Left carpal tunnel release    Position:  Supine    Anesthesia:  General equipment:  Carpal tunnel Set    LAPAROSCOPIC CHOLECYSTECTOMY      laparoscopic removal gallstones    TONSILLECTOMY  2004    corrective septoplasty same time.    TRIGGER FINGER RELEASE Right 3/31/2022    Procedure: Right middle finger trigger release;  Surgeon: Dre Montanez MD;  Location: [facility] OR;  Service: Orthopedics;  Laterality: Right;       Family History:  Family History   Problem Relation Age of Onset    Cancer Mother         triple breast cancer at 2 different times.     Heart disease Father         2V CABG at 45; 12 coronary stents.    Hyperlipidemia Father     Mental illness Father         MIKALA    Stroke Father         traumatic CVA    Vision loss Father         very poor vision    Diabetes Maternal Uncle     Hypertension Paternal Uncle     Cancer Maternal Grandfather          from melanoma at his 60's.    Early death Maternal Grandfather          early 60's of melanoma    Melanoma Maternal Grandfather     Diabetes Paternal Grandmother     Hypertension Paternal Grandfather        Social History:  Social History     Socioeconomic History    Marital status:    Occupational History    Occupation:   for sales force.    Tobacco Use    Smoking status: Never    Smokeless tobacco: Never   Substance and Sexual Activity    Alcohol use: Not Currently     Comment: no alcohol for 8 months as of 3/31/22    Drug use: Yes     Types: Marijuana     Social Determinants of Health     Financial Resource Strain: Low Risk     Difficulty of Paying Living Expenses: Not hard at all   Food Insecurity: No Food Insecurity    Worried About Running Out of Food in the Last Year: Never true    Ran Out of Food in the Last Year: Never true   Transportation Needs: No Transportation Needs    Lack of Transportation (Medical): No    Lack of Transportation (Non-Medical): No   Physical Activity: Sufficiently Active    Days of Exercise per Week: 5 days    Minutes of Exercise per Session: 40 min   Stress: No Stress Concern Present    Feeling of Stress : Not at all   Social Connections: Unknown    Frequency of Communication with Friends and Family: Never    Frequency of Social Gatherings with Friends and Family: Never    Active Member of Clubs or Organizations: No    Attends Club or Organization Meetings: Never    Marital Status:    Housing Stability: Low Risk     Unable to Pay for Housing in the Last Year: No    Number of Places Lived in the Last Year: 1    Unstable Housing in the Last Year: No       Medications:  Current Outpatient Medications on File Prior to Visit   Medication Sig Dispense Refill    amLODIPine (NORVASC) 5 MG tablet Take 1 tablet (5 mg total) by mouth once daily. 90 tablet 1    levocetirizine (XYZAL) 5 MG tablet Take 5 mg by mouth every evening.      potassium chloride (KLOR-CON) 10 MEQ TbSR Take 1 tablet (10 mEq total) by mouth 2 (two) times daily. 180 tablet 1    PROAIR RESPICLICK 90 mcg/actuation inhaler Inhale 2 puffs into the lungs every 6 (six) hours as needed for Wheezing or Shortness of Breath. 1 each 1    valsartan (DIOVAN) 320 MG tablet Take 1 tablet (320 mg total) by mouth once daily. 90 tablet 1    meloxicam  "(MOBIC) 15 MG tablet Take 1 tablet (15 mg total) by mouth once daily. 30 tablet 0    sumatriptan (IMITREX) 50 MG tablet Take 1 tablet (50 mg total) by mouth daily as needed for Migraine. (Patient not taking: Reported on 8/19/2022) 9 tablet 1     No current facility-administered medications on file prior to visit.       Allergies:  Inderal [propranolol] and Lisinopril    Immunizations:  Immunization History   Administered Date(s) Administered    COVID-19, MRNA, LN-S, PF (Pfizer) (Purple Cap) 11/01/2021    COVID-19, mRNA, LNP-S, bivalent booster, PF (PFIZER OMICRON) 10/11/2022    COVID-19, vector-nr, rS-Ad26, PF (Refurrl) 03/10/2021    Influenza 11/15/2021, 09/21/2022    Influenza - Quadrivalent - PF *Preferred* (6 months and older) 10/14/2019, 09/14/2020    Pneumococcal Polysaccharide - 23 Valent 01/01/2010    Tdap 05/29/2009, 05/22/2016       Review of Systems:  Review of Systems   Constitutional:  Positive for fatigue. Negative for chills and fever.   Respiratory:  Negative for shortness of breath and wheezing.    Cardiovascular:  Negative for chest pain and leg swelling.   Gastrointestinal:  Negative for nausea and vomiting.   Neurological:  Negative for dizziness, syncope and light-headedness.     Objective:    Vitals:  Vitals:    02/15/23 0942   BP: 138/78   Pulse: 62   SpO2: 97%   Weight: 120.5 kg (265 lb 12.2 oz)   Height: 5' 11" (1.803 m)   PainSc: 0-No pain       Physical Exam  Vitals reviewed.   Constitutional:       General: He is not in acute distress.     Appearance: He is well-developed.   Eyes:      General: No scleral icterus.  Cardiovascular:      Rate and Rhythm: Normal rate and regular rhythm.      Heart sounds: No murmur heard.  Pulmonary:      Effort: Pulmonary effort is normal. No respiratory distress.      Breath sounds: Normal breath sounds. No wheezing, rhonchi or rales.   Musculoskeletal:      Right lower leg: No edema.      Left lower leg: No edema.   Skin:     General: Skin is warm and " dry.   Neurological:      Mental Status: He is alert and oriented to person, place, and time.   Psychiatric:         Behavior: Behavior normal.       Data:  Lipids with , HDL 33  A1c 5.3  Testosterone 280    Bronwyn Brandt MD  Internal Medicine

## 2023-02-20 ENCOUNTER — PATIENT MESSAGE (OUTPATIENT)
Dept: ORTHOPEDICS | Facility: CLINIC | Age: 43
End: 2023-02-20
Payer: COMMERCIAL

## 2023-02-23 ENCOUNTER — PATIENT MESSAGE (OUTPATIENT)
Dept: FAMILY MEDICINE | Facility: CLINIC | Age: 43
End: 2023-02-23
Payer: COMMERCIAL

## 2023-02-23 DIAGNOSIS — Z00.00 PREVENTATIVE HEALTH CARE: Primary | ICD-10-CM

## 2023-02-24 ENCOUNTER — TELEPHONE (OUTPATIENT)
Dept: ORTHOPEDICS | Facility: CLINIC | Age: 43
End: 2023-02-24
Payer: COMMERCIAL

## 2023-02-24 DIAGNOSIS — M18.12 ARTHRITIS OF CARPOMETACARPAL (CMC) JOINT OF LEFT THUMB: Primary | ICD-10-CM

## 2023-02-24 RX ORDER — MELOXICAM 15 MG/1
15 TABLET ORAL DAILY
Qty: 28 TABLET | Refills: 0 | Status: SHIPPED | OUTPATIENT
Start: 2023-02-24 | End: 2023-07-13

## 2023-03-01 ENCOUNTER — PATIENT MESSAGE (OUTPATIENT)
Dept: FAMILY MEDICINE | Facility: CLINIC | Age: 43
End: 2023-03-01
Payer: COMMERCIAL

## 2023-03-08 LAB — HEMOCCULT STL QL IA: NORMAL

## 2023-03-20 ENCOUNTER — LAB VISIT (OUTPATIENT)
Dept: LAB | Facility: HOSPITAL | Age: 43
End: 2023-03-20
Attending: INTERNAL MEDICINE
Payer: COMMERCIAL

## 2023-03-20 DIAGNOSIS — Z00.00 PREVENTATIVE HEALTH CARE: ICD-10-CM

## 2023-03-20 PROCEDURE — 82274 ASSAY TEST FOR BLOOD FECAL: CPT | Performed by: INTERNAL MEDICINE

## 2023-03-23 LAB — HEMOCCULT STL QL IA: NEGATIVE

## 2023-03-31 ENCOUNTER — PATIENT MESSAGE (OUTPATIENT)
Dept: ADMINISTRATIVE | Facility: OTHER | Age: 43
End: 2023-03-31
Payer: COMMERCIAL

## 2023-07-11 DIAGNOSIS — M25.571 RIGHT ANKLE PAIN: ICD-10-CM

## 2023-07-11 DIAGNOSIS — M79.671 RIGHT FOOT PAIN: Primary | ICD-10-CM

## 2023-07-12 ENCOUNTER — PATIENT MESSAGE (OUTPATIENT)
Dept: ORTHOPEDICS | Facility: CLINIC | Age: 43
End: 2023-07-12

## 2023-07-12 ENCOUNTER — HOSPITAL ENCOUNTER (OUTPATIENT)
Dept: RADIOLOGY | Facility: HOSPITAL | Age: 43
Discharge: HOME | End: 2023-07-12
Attending: ORTHOPAEDIC SURGERY
Payer: COMMERCIAL

## 2023-07-12 ENCOUNTER — OFFICE VISIT (OUTPATIENT)
Dept: ORTHOPEDICS | Facility: CLINIC | Age: 43
End: 2023-07-12
Payer: COMMERCIAL

## 2023-07-12 VITALS — BODY MASS INDEX: 37.1 KG/M2 | HEIGHT: 71 IN | WEIGHT: 265 LBS

## 2023-07-12 DIAGNOSIS — M67.88 ACHILLES TENDONOSIS: ICD-10-CM

## 2023-07-12 DIAGNOSIS — M25.571 RIGHT ANKLE PAIN: ICD-10-CM

## 2023-07-12 DIAGNOSIS — M67.88 ACHILLES TENDONOSIS: Primary | ICD-10-CM

## 2023-07-12 DIAGNOSIS — M79.671 RIGHT FOOT PAIN: Primary | ICD-10-CM

## 2023-07-12 DIAGNOSIS — M79.671 RIGHT FOOT PAIN: ICD-10-CM

## 2023-07-12 PROCEDURE — 1159F PR MEDICATION LIST DOCUMENTED IN MEDICAL RECORD: ICD-10-PCS | Mod: CPTII,S$GLB,, | Performed by: ORTHOPAEDIC SURGERY

## 2023-07-12 PROCEDURE — 1160F PR REVIEW ALL MEDS BY PRESCRIBER/CLIN PHARMACIST DOCUMENTED: ICD-10-PCS | Mod: CPTII,S$GLB,, | Performed by: ORTHOPAEDIC SURGERY

## 2023-07-12 PROCEDURE — 3008F BODY MASS INDEX DOCD: CPT | Mod: CPTII,S$GLB,, | Performed by: ORTHOPAEDIC SURGERY

## 2023-07-12 PROCEDURE — 73610 X-RAY EXAM OF ANKLE: CPT | Mod: TC,PO,RT

## 2023-07-12 PROCEDURE — 73610 X-RAY EXAM OF ANKLE: CPT | Mod: 26,RT,, | Performed by: RADIOLOGY

## 2023-07-12 PROCEDURE — 99999 PR PBB SHADOW E&M-EST. PATIENT-LVL III: CPT | Mod: PBBFAC,,, | Performed by: ORTHOPAEDIC SURGERY

## 2023-07-12 PROCEDURE — 1160F RVW MEDS BY RX/DR IN RCRD: CPT | Mod: CPTII,S$GLB,, | Performed by: ORTHOPAEDIC SURGERY

## 2023-07-12 PROCEDURE — 4010F PR ACE/ARB THEARPY RXD/TAKEN: ICD-10-PCS | Mod: CPTII,S$GLB,, | Performed by: ORTHOPAEDIC SURGERY

## 2023-07-12 PROCEDURE — 3044F HG A1C LEVEL LT 7.0%: CPT | Mod: CPTII,S$GLB,, | Performed by: ORTHOPAEDIC SURGERY

## 2023-07-12 PROCEDURE — 73630 XR FOOT COMPLETE 3 VIEW RIGHT: ICD-10-PCS | Mod: 26,RT,, | Performed by: RADIOLOGY

## 2023-07-12 PROCEDURE — 3008F PR BODY MASS INDEX (BMI) DOCUMENTED: ICD-10-PCS | Mod: CPTII,S$GLB,, | Performed by: ORTHOPAEDIC SURGERY

## 2023-07-12 PROCEDURE — 4010F ACE/ARB THERAPY RXD/TAKEN: CPT | Mod: CPTII,S$GLB,, | Performed by: ORTHOPAEDIC SURGERY

## 2023-07-12 PROCEDURE — 99213 OFFICE O/P EST LOW 20 MIN: CPT | Mod: S$GLB,,, | Performed by: ORTHOPAEDIC SURGERY

## 2023-07-12 PROCEDURE — 73610 XR ANKLE COMPLETE 3 VIEW RIGHT: ICD-10-PCS | Mod: 26,RT,, | Performed by: RADIOLOGY

## 2023-07-12 PROCEDURE — 73630 X-RAY EXAM OF FOOT: CPT | Mod: TC,PO,RT

## 2023-07-12 PROCEDURE — 99213 PR OFFICE/OUTPT VISIT, EST, LEVL III, 20-29 MIN: ICD-10-PCS | Mod: S$GLB,,, | Performed by: ORTHOPAEDIC SURGERY

## 2023-07-12 PROCEDURE — 1159F MED LIST DOCD IN RCRD: CPT | Mod: CPTII,S$GLB,, | Performed by: ORTHOPAEDIC SURGERY

## 2023-07-12 PROCEDURE — 73630 X-RAY EXAM OF FOOT: CPT | Mod: 26,RT,, | Performed by: RADIOLOGY

## 2023-07-12 PROCEDURE — 3044F PR MOST RECENT HEMOGLOBIN A1C LEVEL <7.0%: ICD-10-PCS | Mod: CPTII,S$GLB,, | Performed by: ORTHOPAEDIC SURGERY

## 2023-07-12 PROCEDURE — 99999 PR PBB SHADOW E&M-EST. PATIENT-LVL III: ICD-10-PCS | Mod: PBBFAC,,, | Performed by: ORTHOPAEDIC SURGERY

## 2023-07-12 NOTE — PROGRESS NOTES
43 years old pain in the right posterior calcaneus for about 2 months time noticed a prominence in the area pain is been for on good days 5 on bad days.  Anti-inflammatories provide partial relief    Exam shows prominence of the Achilles tendon insertion without signs infection, Achilles tendon is intact     X-rays show osteophyte at the Achilles tendon insertion posterior calcaneus     Assessment:  Chronic insertional Achilles tendinitis right     Plan:  Heel lift, physical therapy, anti-inflammatories as needed, follow-up in several weeks time as needed    Patient seen as a consult from Dr. Bronwyn Brandt, communication via epic

## 2023-07-13 ENCOUNTER — OFFICE VISIT (OUTPATIENT)
Dept: ORTHOPEDICS | Facility: CLINIC | Age: 43
End: 2023-07-13
Payer: COMMERCIAL

## 2023-07-13 DIAGNOSIS — M76.60 INSERTIONAL ACHILLES TENDINOPATHY: ICD-10-CM

## 2023-07-13 DIAGNOSIS — Q79.8 CONGENITAL CONTRACTURE OF RIGHT GASTROCNEMIUS MUSCLE: Primary | ICD-10-CM

## 2023-07-13 PROCEDURE — 1160F PR REVIEW ALL MEDS BY PRESCRIBER/CLIN PHARMACIST DOCUMENTED: ICD-10-PCS | Mod: CPTII,S$GLB,, | Performed by: ORTHOPAEDIC SURGERY

## 2023-07-13 PROCEDURE — 1159F MED LIST DOCD IN RCRD: CPT | Mod: CPTII,S$GLB,, | Performed by: ORTHOPAEDIC SURGERY

## 2023-07-13 PROCEDURE — 3044F PR MOST RECENT HEMOGLOBIN A1C LEVEL <7.0%: ICD-10-PCS | Mod: CPTII,S$GLB,, | Performed by: ORTHOPAEDIC SURGERY

## 2023-07-13 PROCEDURE — 99999 PR PBB SHADOW E&M-EST. PATIENT-LVL II: ICD-10-PCS | Mod: PBBFAC,,, | Performed by: ORTHOPAEDIC SURGERY

## 2023-07-13 PROCEDURE — 1160F RVW MEDS BY RX/DR IN RCRD: CPT | Mod: CPTII,S$GLB,, | Performed by: ORTHOPAEDIC SURGERY

## 2023-07-13 PROCEDURE — 4010F ACE/ARB THERAPY RXD/TAKEN: CPT | Mod: CPTII,S$GLB,, | Performed by: ORTHOPAEDIC SURGERY

## 2023-07-13 PROCEDURE — 99214 OFFICE O/P EST MOD 30 MIN: CPT | Mod: S$GLB,,, | Performed by: ORTHOPAEDIC SURGERY

## 2023-07-13 PROCEDURE — 99999 PR PBB SHADOW E&M-EST. PATIENT-LVL II: CPT | Mod: PBBFAC,,, | Performed by: ORTHOPAEDIC SURGERY

## 2023-07-13 PROCEDURE — 4010F PR ACE/ARB THEARPY RXD/TAKEN: ICD-10-PCS | Mod: CPTII,S$GLB,, | Performed by: ORTHOPAEDIC SURGERY

## 2023-07-13 PROCEDURE — 3044F HG A1C LEVEL LT 7.0%: CPT | Mod: CPTII,S$GLB,, | Performed by: ORTHOPAEDIC SURGERY

## 2023-07-13 PROCEDURE — 1159F PR MEDICATION LIST DOCUMENTED IN MEDICAL RECORD: ICD-10-PCS | Mod: CPTII,S$GLB,, | Performed by: ORTHOPAEDIC SURGERY

## 2023-07-13 PROCEDURE — 99214 PR OFFICE/OUTPT VISIT, EST, LEVL IV, 30-39 MIN: ICD-10-PCS | Mod: S$GLB,,, | Performed by: ORTHOPAEDIC SURGERY

## 2023-07-13 RX ORDER — DICLOFENAC SODIUM 75 MG/1
75 TABLET, DELAYED RELEASE ORAL 2 TIMES DAILY WITH MEALS
Qty: 60 TABLET | Refills: 2 | Status: SHIPPED | OUTPATIENT
Start: 2023-07-13 | End: 2023-10-11

## 2023-07-13 NOTE — PROGRESS NOTES
Status/Diagnosis: Right gastroc contracture and AICT.  Date of Surgery: none  Date of Injury: none; DOO March 2023  Return visit: PRN  X-rays on Return: pending patient complaint    Chief Complaint:   Chief Complaint   Patient presents with    Right Ankle - Pain     Present History:  Chad Chen is a 43 y.o. male who presents today via referral from Dr. Alex Thao.  Patient endorses an approximately 4 month history worsening right posterior heel pain.  Noticed a bump over the area of concern more recently.  Denies any history of injury or other inciting event.  Patient has minimal pain at rest, increased with weight-bearing and also pain due to irritation from mass effect.  Denies any numbness or tingling.  Recently seen by my partner, Dr. Thao, for evaluation and treatment.  Discussed the use of a heel lift, anti-inflammatories, and physical therapy.  Patient reports that he called to make his 1st appointment with PT however we will not be able to be seen for the next 3 weeks.  Was taking over-the-counter oral ibuprofen as needed for pain intermittently but with minimal relief.  Past medical history significant for hypertension and asthma.  Denies tobacco use.      Past Medical History:   Diagnosis Date    Asthma     exercise induced    Cardiac murmur     Dyslipidemia     Hypertension     Obesity     PONV (postoperative nausea and vomiting)     Seasonal and perennial allergic rhinitis        Past Surgical History:   Procedure Laterality Date    ADENOIDECTOMY  2004    CARPAL TUNNEL RELEASE Left 12/17/2020    Procedure: RELEASE, CARPAL TUNNEL;  Surgeon: Dre Montanez MD;  Location: [facility];  Service: Orthopedics;  Laterality: Left;  Procedure:  Left carpal tunnel release    Position:  Supine    Anesthesia:  General equipment:  Carpal tunnel Set    LAPAROSCOPIC CHOLECYSTECTOMY  2010    laparoscopic removal gallstones    TONSILLECTOMY  2004    corrective septoplasty same time.    TRIGGER FINGER RELEASE Right  3/31/2022    Procedure: Right middle finger trigger release;  Surgeon: Dre Montanez MD;  Location: [facility];  Service: Orthopedics;  Laterality: Right;       Current Outpatient Medications   Medication Sig    amLODIPine (NORVASC) 5 MG tablet Take 1 tablet (5 mg total) by mouth once daily.    levocetirizine (XYZAL) 5 MG tablet Take 5 mg by mouth every evening.    potassium chloride (KLOR-CON) 10 MEQ TbSR Take 1 tablet (10 mEq total) by mouth 2 (two) times daily.    PROAIR RESPICLICK 90 mcg/actuation inhaler Inhale 2 puffs into the lungs every 6 (six) hours as needed for Wheezing or Shortness of Breath.    sildenafiL (VIAGRA) 50 MG tablet Take 1 tablet (50 mg total) by mouth daily as needed for Erectile Dysfunction.    sumatriptan (IMITREX) 50 MG tablet Take 1 tablet (50 mg total) by mouth daily as needed for Migraine. (Patient not taking: Reported on 2022)    valsartan (DIOVAN) 320 MG tablet Take 1 tablet (320 mg total) by mouth once daily.     No current facility-administered medications for this visit.       Review of patient's allergies indicates:   Allergen Reactions    Inderal [propranolol] Hives    Lisinopril Other (See Comments)     cough       Family History   Problem Relation Age of Onset    Cancer Mother         triple breast cancer at 2 different times.     Heart disease Father         2V CABG at 45; 12 coronary stents.    Hyperlipidemia Father     Mental illness Father         MIKALA    Stroke Father         traumatic CVA    Vision loss Father         very poor vision    Diabetes Maternal Uncle     Hypertension Paternal Uncle     Cancer Maternal Grandfather          from melanoma at his 60's.    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    Tobacco Use    Smoking status: Never    Smokeless tobacco: Never   Substance and Sexual Activity    Alcohol use: Not Currently     Comment: no alcohol for 8 months as of 3/31/22    Drug use: Yes     Types: Marijuana     Social Determinants of Health     Financial Resource Strain: Low Risk     Difficulty of Paying Living Expenses: Not hard at all   Food Insecurity: No Food Insecurity    Worried About Running Out of Food in the Last Year: Never true    Ran Out of Food in the Last Year: Never true   Transportation Needs: No Transportation Needs    Lack of Transportation (Medical): No    Lack of Transportation (Non-Medical): No   Physical Activity: Sufficiently Active    Days of Exercise per Week: 5 days    Minutes of Exercise per Session: 40 min   Stress: No Stress Concern Present    Feeling of Stress : Not at all   Social Connections: Unknown    Frequency of Communication with Friends and Family: Never    Frequency of Social Gatherings with Friends and Family: Never    Active Member of Clubs or Organizations: No    Attends Club or Organization Meetings: Never    Marital Status:    Housing Stability: Low Risk     Unable to Pay for Housing in the Last Year: No    Number of Places Lived in the Last Year: 1    Unstable Housing in the Last Year: No       Physical exam:  There were no vitals filed for this visit.  There is no height or weight on file to calculate BMI.  General: In no apparent distress; well developed and well nourished.  HEENT: normocephalic; atraumatic.  Cardiovascular: regular rate.  Respiratory: no increased work of breathing.  Musculoskeletal:   Gait: mild antalgic  Inspection:   Patient with large bony prominence over the posterior aspect of the heel at the level of the Achilles insertion.  No skin tenting or breakdown is present.  Moderate tenderness on deep palpation at this site.  No palpable defect along the Achilles tendon.  Good tension with Ramirez testing.   With the patient prone and knees flexed 90°, symmetric resting ankle plantar flexion.  No pain referable to the foot.  No laxity with anterior drawer testing.  Silfverskiold: significant positive  Alignment:  Knee: neutral               Ankle: neutral              Hindfoot: neutral              Forefoot: neutral   Strength:              Dorsiflexion 5/5  Plantar flexion 5/5  Inversion 5/5   Eversion 5/5   Sensation:              SILT distally   ROM:              Ankle: Full and painless.              Subtalar: Painless inversion and eversion   Pulses: 2+ DP/PT pulses.                   Imaging Studies/Outside documentation:  I have ordered/reviewed/interpreted the following images/outside documentation:  1. Weight bearing 3-views of Right foot and ankle:   On my independent review, no acute bony abnormality noted.  Large enthesophyte at the Achilles insertion with adjacent Maurisio deformity.  Congruent ankle mortise.  No significant degenerative joint changes.        Assessment:  Chad Chen is a 43 y.o. male with Right gastroc contracture and AICT.     Plan:   Clinical and radiographic findings were discussed.  Operative versus nonoperative treatment options were described.  Specifically went into detail regarding what the surgery would entail, risks and benefits, and postoperative timeline and expectations.    Specifically discussed the 6-12 month postoperative recovery. .    We will plan to exhaust conservative treatment measures at this time.  We will prescribe a short course of oral NSAIDs, diclofenac prescription provided.  Patient denies any history of GI ulceration or kidney disorder.    Discuss obtaining a heel lift.  Patient reports he already has this.  Also discussed tall boot wear over the next 2-3 weeks until starting physical therapy for symptomatic relief.  Again patient reports he has this from a contralateral extremity surgery.  Patient voiced understanding.  All questions regarding surgical and  nonsurgical management were answered at length.  He will call regarding future follow-up should symptoms persist or worsen.      This note was created using voice recognition software and may contain grammatical errors.

## 2023-08-10 ENCOUNTER — PATIENT MESSAGE (OUTPATIENT)
Dept: FAMILY MEDICINE | Facility: CLINIC | Age: 43
End: 2023-08-10
Payer: COMMERCIAL

## 2023-08-10 DIAGNOSIS — I10 ESSENTIAL HYPERTENSION: ICD-10-CM

## 2023-08-10 DIAGNOSIS — E29.1 HYPOGONADISM IN MALE: Primary | ICD-10-CM

## 2023-08-10 NOTE — TELEPHONE ENCOUNTER
Last labs were February 2023, please advise if anything additional will be needed for 6 month visit

## 2023-08-15 ENCOUNTER — LAB VISIT (OUTPATIENT)
Dept: LAB | Facility: HOSPITAL | Age: 43
End: 2023-08-15
Attending: INTERNAL MEDICINE
Payer: COMMERCIAL

## 2023-08-15 DIAGNOSIS — E29.1 HYPOGONADISM IN MALE: ICD-10-CM

## 2023-08-15 DIAGNOSIS — I10 ESSENTIAL HYPERTENSION: ICD-10-CM

## 2023-08-15 LAB
ANION GAP SERPL CALC-SCNC: 11 MMOL/L (ref 8–16)
BUN SERPL-MCNC: 15 MG/DL (ref 6–20)
CALCIUM SERPL-MCNC: 8.9 MG/DL (ref 8.7–10.5)
CHLORIDE SERPL-SCNC: 105 MMOL/L (ref 95–110)
CO2 SERPL-SCNC: 24 MMOL/L (ref 23–29)
CREAT SERPL-MCNC: 0.8 MG/DL (ref 0.5–1.4)
EST. GFR  (NO RACE VARIABLE): >60 ML/MIN/1.73 M^2
GLUCOSE SERPL-MCNC: 91 MG/DL (ref 70–110)
POTASSIUM SERPL-SCNC: 3.4 MMOL/L (ref 3.5–5.1)
SODIUM SERPL-SCNC: 140 MMOL/L (ref 136–145)
TESTOST SERPL-MCNC: 314 NG/DL (ref 304–1227)

## 2023-08-15 PROCEDURE — 36415 COLL VENOUS BLD VENIPUNCTURE: CPT | Mod: PN | Performed by: INTERNAL MEDICINE

## 2023-08-15 PROCEDURE — 84403 ASSAY OF TOTAL TESTOSTERONE: CPT | Performed by: INTERNAL MEDICINE

## 2023-08-15 PROCEDURE — 80048 BASIC METABOLIC PNL TOTAL CA: CPT | Performed by: INTERNAL MEDICINE

## 2023-08-18 DIAGNOSIS — E87.6 HYPOKALEMIA: ICD-10-CM

## 2023-08-18 NOTE — TELEPHONE ENCOUNTER
No care due was identified.  [facility] Embedded Care Due Messages. Reference number: [id number].   8/18/2023 4:53:51 PM CDT

## 2023-08-20 RX ORDER — POTASSIUM CHLORIDE 750 MG/1
10 TABLET, EXTENDED RELEASE ORAL 2 TIMES DAILY
Qty: 180 TABLET | Refills: 1 | Status: SHIPPED | OUTPATIENT
Start: 2023-08-20 | End: 2024-02-21 | Stop reason: SDUPTHER

## 2023-08-20 NOTE — TELEPHONE ENCOUNTER
Refill Decision Note   Chad Chen  is requesting a refill authorization.  Brief Assessment and Rationale for Refill:  Approve     Medication Therapy Plan:         Comments:     Note composed:2:11 AM 08/20/2023

## 2023-09-05 ENCOUNTER — OFFICE VISIT (OUTPATIENT)
Dept: FAMILY MEDICINE | Facility: CLINIC | Age: 43
End: 2023-09-05
Payer: COMMERCIAL

## 2023-09-05 VITALS
HEART RATE: 72 BPM | DIASTOLIC BLOOD PRESSURE: 74 MMHG | WEIGHT: 267.88 LBS | SYSTOLIC BLOOD PRESSURE: 128 MMHG | BODY MASS INDEX: 37.5 KG/M2 | OXYGEN SATURATION: 99 % | HEIGHT: 71 IN

## 2023-09-05 DIAGNOSIS — Z91.09 ENVIRONMENTAL ALLERGIES: ICD-10-CM

## 2023-09-05 DIAGNOSIS — E87.6 HYPOKALEMIA: ICD-10-CM

## 2023-09-05 DIAGNOSIS — J45.40 MODERATE PERSISTENT ASTHMA WITHOUT COMPLICATION: Primary | ICD-10-CM

## 2023-09-05 DIAGNOSIS — J45.990 EXERCISE-INDUCED ASTHMA: ICD-10-CM

## 2023-09-05 DIAGNOSIS — R07.89 CHEST TIGHTNESS: ICD-10-CM

## 2023-09-05 PROCEDURE — 1159F PR MEDICATION LIST DOCUMENTED IN MEDICAL RECORD: ICD-10-PCS | Mod: CPTII,S$GLB,, | Performed by: INTERNAL MEDICINE

## 2023-09-05 PROCEDURE — 3044F HG A1C LEVEL LT 7.0%: CPT | Mod: CPTII,S$GLB,, | Performed by: INTERNAL MEDICINE

## 2023-09-05 PROCEDURE — 99999 PR PBB SHADOW E&M-EST. PATIENT-LVL III: ICD-10-PCS | Mod: PBBFAC,,, | Performed by: INTERNAL MEDICINE

## 2023-09-05 PROCEDURE — 1160F PR REVIEW ALL MEDS BY PRESCRIBER/CLIN PHARMACIST DOCUMENTED: ICD-10-PCS | Mod: CPTII,S$GLB,, | Performed by: INTERNAL MEDICINE

## 2023-09-05 PROCEDURE — 99214 OFFICE O/P EST MOD 30 MIN: CPT | Mod: S$GLB,,, | Performed by: INTERNAL MEDICINE

## 2023-09-05 PROCEDURE — 99999 PR PBB SHADOW E&M-EST. PATIENT-LVL III: CPT | Mod: PBBFAC,,, | Performed by: INTERNAL MEDICINE

## 2023-09-05 PROCEDURE — 3074F PR MOST RECENT SYSTOLIC BLOOD PRESSURE < 130 MM HG: ICD-10-PCS | Mod: CPTII,S$GLB,, | Performed by: INTERNAL MEDICINE

## 2023-09-05 PROCEDURE — 3008F BODY MASS INDEX DOCD: CPT | Mod: CPTII,S$GLB,, | Performed by: INTERNAL MEDICINE

## 2023-09-05 PROCEDURE — 3078F DIAST BP <80 MM HG: CPT | Mod: CPTII,S$GLB,, | Performed by: INTERNAL MEDICINE

## 2023-09-05 PROCEDURE — 4010F ACE/ARB THERAPY RXD/TAKEN: CPT | Mod: CPTII,S$GLB,, | Performed by: INTERNAL MEDICINE

## 2023-09-05 PROCEDURE — 99214 PR OFFICE/OUTPT VISIT, EST, LEVL IV, 30-39 MIN: ICD-10-PCS | Mod: S$GLB,,, | Performed by: INTERNAL MEDICINE

## 2023-09-05 PROCEDURE — 3044F PR MOST RECENT HEMOGLOBIN A1C LEVEL <7.0%: ICD-10-PCS | Mod: CPTII,S$GLB,, | Performed by: INTERNAL MEDICINE

## 2023-09-05 PROCEDURE — 4010F PR ACE/ARB THEARPY RXD/TAKEN: ICD-10-PCS | Mod: CPTII,S$GLB,, | Performed by: INTERNAL MEDICINE

## 2023-09-05 PROCEDURE — 1159F MED LIST DOCD IN RCRD: CPT | Mod: CPTII,S$GLB,, | Performed by: INTERNAL MEDICINE

## 2023-09-05 PROCEDURE — 3078F PR MOST RECENT DIASTOLIC BLOOD PRESSURE < 80 MM HG: ICD-10-PCS | Mod: CPTII,S$GLB,, | Performed by: INTERNAL MEDICINE

## 2023-09-05 PROCEDURE — 1160F RVW MEDS BY RX/DR IN RCRD: CPT | Mod: CPTII,S$GLB,, | Performed by: INTERNAL MEDICINE

## 2023-09-05 PROCEDURE — 3008F PR BODY MASS INDEX (BMI) DOCUMENTED: ICD-10-PCS | Mod: CPTII,S$GLB,, | Performed by: INTERNAL MEDICINE

## 2023-09-05 PROCEDURE — 3074F SYST BP LT 130 MM HG: CPT | Mod: CPTII,S$GLB,, | Performed by: INTERNAL MEDICINE

## 2023-09-05 RX ORDER — AZELASTINE 1 MG/ML
1 SPRAY, METERED NASAL 2 TIMES DAILY
Qty: 30 ML | Refills: 0 | Status: SHIPPED | OUTPATIENT
Start: 2023-09-05 | End: 2023-11-21

## 2023-09-05 RX ORDER — ALBUTEROL SULFATE 90 UG/1
2 POWDER, METERED RESPIRATORY (INHALATION) EVERY 6 HOURS PRN
Qty: 1 EACH | Refills: 1 | Status: SHIPPED | OUTPATIENT
Start: 2023-09-05

## 2023-09-05 RX ORDER — FLUTICASONE FUROATE AND VILANTEROL 100; 25 UG/1; UG/1
1 POWDER RESPIRATORY (INHALATION) DAILY
Qty: 60 EACH | Refills: 2 | Status: SHIPPED | OUTPATIENT
Start: 2023-09-05 | End: 2023-09-27

## 2023-09-05 NOTE — PROGRESS NOTES
Assessment and Plan:    1. Moderate persistent asthma without complication  Patient reporting significant increase in symptoms.  Will add Breo.  Exam not exactly consistent with typical asthma.  Will obtain PFTs.  Discussed that this may be upper airway increased sounds due to postnasal drip.  Advised continuing Flonase, and adding Astelin.  - Complete PFT w/ bronchodilator; Future  - fluticasone furoate-vilanteroL (BREO) 100-25 mcg/dose diskus inhaler; Inhale 1 puff into the lungs once daily. Controller  Dispense: 60 each; Refill: 2    2. Chest tightness  - Complete PFT w/ bronchodilator; Future    3. Hypokalemia  Continue current dose of potassium, recheck in a couple months.  - Basic Metabolic Panel; Future    4. Exercise-induced asthma  - PROAIR RESPICLICK 90 mcg/actuation inhaler; Inhale 2 puffs into the lungs every 6 (six) hours as needed for Wheezing or Shortness of Breath.  Dispense: 1 each; Refill: 1    5. Environmental allergies  - azelastine (ASTELIN) 137 mcg (0.1 %) nasal spray; 1 spray (137 mcg total) by Nasal route 2 (two) times daily.  Dispense: 30 mL; Refill: 0      ______________________________________________________________________  Subjective:    Chief Complaint:  Follow up chronic medical conditions.    HPI:  Chad is a 43 y.o. year old male here to follow up chronic medical conditions.     Asthma- He has been having intermittent wheezing since he had an upper respiratory infection. Mainly hearing the wheezing.  He has had some sensation of chest tightness, mainly when he has been walking outside.  This goes away when he stops walking.    HTN- Controlled with valsartan 320 and amlodipine 5.      Dyslipidemia- Last labs had been elevated. Discussed diet and exercise.  He reports that he has been limited in his exercise recently due to respiratory symptoms.     Testosterone- Low on last lab in Feb. Improved on recent labs.    ED- Prescribed sildenafil last visit.     Hypokalemia- Taking 10 mEq BID.  "Recent lab with K 3.4.    Medications:  Current Outpatient Medications on File Prior to Visit   Medication Sig Dispense Refill    amLODIPine (NORVASC) 5 MG tablet Take 1 tablet (5 mg total) by mouth once daily. 90 tablet 1    levocetirizine (XYZAL) 5 MG tablet Take 5 mg by mouth every evening.      potassium chloride (KLOR-CON) 10 MEQ TbSR TAKE 1 TABLET(10 MEQ) BY MOUTH TWICE DAILY 180 tablet 1    PROAIR RESPICLICK 90 mcg/actuation inhaler Inhale 2 puffs into the lungs every 6 (six) hours as needed for Wheezing or Shortness of Breath. 1 each 1    sildenafiL (VIAGRA) 50 MG tablet Take 1 tablet (50 mg total) by mouth daily as needed for Erectile Dysfunction. 30 tablet 0    sumatriptan (IMITREX) 50 MG tablet Take 1 tablet (50 mg total) by mouth daily as needed for Migraine. 9 tablet 1    valsartan (DIOVAN) 320 MG tablet Take 1 tablet (320 mg total) by mouth once daily. 90 tablet 1    diclofenac (VOLTAREN) 75 MG EC tablet Take 1 tablet (75 mg total) by mouth 2 (two) times daily with meals. (Patient not taking: Reported on 9/5/2023) 60 tablet 2     No current facility-administered medications on file prior to visit.       Review of Systems:  Review of Systems   HENT:  Positive for congestion and postnasal drip.    Respiratory:  Positive for chest tightness and wheezing. Negative for shortness of breath.    Cardiovascular:  Negative for chest pain and leg swelling.   Gastrointestinal:  Negative for diarrhea, nausea and vomiting.   Neurological:  Negative for dizziness, syncope and light-headedness.       Past Medical History:  Past Medical History:   Diagnosis Date    Asthma     exercise induced    Cardiac murmur     Dyslipidemia     Hypertension     Obesity     PONV (postoperative nausea and vomiting)     Seasonal and perennial allergic rhinitis        Objective:    Vitals:  Vitals:    09/05/23 1146   BP: 128/74   Pulse: 72   SpO2: 99%   Weight: 121.5 kg (267 lb 13.7 oz)   Height: 5' 11" (1.803 m)   PainSc: 0-No pain "       Physical Exam  Vitals reviewed.   Constitutional:       General: He is not in acute distress.     Appearance: He is well-developed.   Eyes:      General: No scleral icterus.  Cardiovascular:      Rate and Rhythm: Normal rate and regular rhythm.      Heart sounds: No murmur heard.  Pulmonary:      Effort: Pulmonary effort is normal. No respiratory distress.      Breath sounds: Normal breath sounds. No rhonchi or rales.      Comments: initially heard upper airway inspiratory wheeze, but this cleared with coughing; no lower airway sounds, no expiratory wheezing  Musculoskeletal:      Right lower leg: No edema.      Left lower leg: No edema.   Skin:     General: Skin is warm and dry.   Neurological:      Mental Status: He is alert and oriented to person, place, and time.   Psychiatric:         Behavior: Behavior normal.         Data:  K 3.4      Bronwyn Brandt MD  Internal Medicine

## 2023-09-22 ENCOUNTER — LAB VISIT (OUTPATIENT)
Dept: LAB | Facility: HOSPITAL | Age: 43
End: 2023-09-22
Attending: INTERNAL MEDICINE
Payer: COMMERCIAL

## 2023-09-22 DIAGNOSIS — E87.6 HYPOKALEMIA: ICD-10-CM

## 2023-09-22 LAB
ANION GAP SERPL CALC-SCNC: 5 MMOL/L (ref 8–16)
BUN SERPL-MCNC: 15 MG/DL (ref 6–20)
CALCIUM SERPL-MCNC: 9.3 MG/DL (ref 8.7–10.5)
CHLORIDE SERPL-SCNC: 107 MMOL/L (ref 95–110)
CO2 SERPL-SCNC: 29 MMOL/L (ref 23–29)
CREAT SERPL-MCNC: 1 MG/DL (ref 0.5–1.4)
EST. GFR  (NO RACE VARIABLE): >60 ML/MIN/1.73 M^2
GLUCOSE SERPL-MCNC: 101 MG/DL (ref 70–110)
POTASSIUM SERPL-SCNC: 3.7 MMOL/L (ref 3.5–5.1)
SODIUM SERPL-SCNC: 141 MMOL/L (ref 136–145)

## 2023-09-22 PROCEDURE — 80048 BASIC METABOLIC PNL TOTAL CA: CPT | Performed by: INTERNAL MEDICINE

## 2023-09-22 PROCEDURE — 36415 COLL VENOUS BLD VENIPUNCTURE: CPT | Mod: PN | Performed by: INTERNAL MEDICINE

## 2023-09-27 ENCOUNTER — OFFICE VISIT (OUTPATIENT)
Dept: FAMILY MEDICINE | Facility: CLINIC | Age: 43
End: 2023-09-27
Payer: COMMERCIAL

## 2023-09-27 VITALS
BODY MASS INDEX: 37.91 KG/M2 | RESPIRATION RATE: 16 BRPM | SYSTOLIC BLOOD PRESSURE: 132 MMHG | OXYGEN SATURATION: 99 % | HEIGHT: 71 IN | HEART RATE: 58 BPM | DIASTOLIC BLOOD PRESSURE: 74 MMHG | WEIGHT: 270.81 LBS

## 2023-09-27 DIAGNOSIS — G43.109 MIGRAINE WITH AURA AND WITHOUT STATUS MIGRAINOSUS, NOT INTRACTABLE: Primary | ICD-10-CM

## 2023-09-27 PROCEDURE — 1160F RVW MEDS BY RX/DR IN RCRD: CPT | Mod: CPTII,S$GLB,, | Performed by: INTERNAL MEDICINE

## 2023-09-27 PROCEDURE — 4010F ACE/ARB THERAPY RXD/TAKEN: CPT | Mod: CPTII,S$GLB,, | Performed by: INTERNAL MEDICINE

## 2023-09-27 PROCEDURE — 1159F MED LIST DOCD IN RCRD: CPT | Mod: CPTII,S$GLB,, | Performed by: INTERNAL MEDICINE

## 2023-09-27 PROCEDURE — 3078F PR MOST RECENT DIASTOLIC BLOOD PRESSURE < 80 MM HG: ICD-10-PCS | Mod: CPTII,S$GLB,, | Performed by: INTERNAL MEDICINE

## 2023-09-27 PROCEDURE — 1159F PR MEDICATION LIST DOCUMENTED IN MEDICAL RECORD: ICD-10-PCS | Mod: CPTII,S$GLB,, | Performed by: INTERNAL MEDICINE

## 2023-09-27 PROCEDURE — 99214 OFFICE O/P EST MOD 30 MIN: CPT | Mod: S$GLB,,, | Performed by: INTERNAL MEDICINE

## 2023-09-27 PROCEDURE — 3044F HG A1C LEVEL LT 7.0%: CPT | Mod: CPTII,S$GLB,, | Performed by: INTERNAL MEDICINE

## 2023-09-27 PROCEDURE — 3044F PR MOST RECENT HEMOGLOBIN A1C LEVEL <7.0%: ICD-10-PCS | Mod: CPTII,S$GLB,, | Performed by: INTERNAL MEDICINE

## 2023-09-27 PROCEDURE — 99999 PR PBB SHADOW E&M-EST. PATIENT-LVL III: ICD-10-PCS | Mod: PBBFAC,,, | Performed by: INTERNAL MEDICINE

## 2023-09-27 PROCEDURE — 3075F SYST BP GE 130 - 139MM HG: CPT | Mod: CPTII,S$GLB,, | Performed by: INTERNAL MEDICINE

## 2023-09-27 PROCEDURE — 3075F PR MOST RECENT SYSTOLIC BLOOD PRESS GE 130-139MM HG: ICD-10-PCS | Mod: CPTII,S$GLB,, | Performed by: INTERNAL MEDICINE

## 2023-09-27 PROCEDURE — 3008F BODY MASS INDEX DOCD: CPT | Mod: CPTII,S$GLB,, | Performed by: INTERNAL MEDICINE

## 2023-09-27 PROCEDURE — 99214 PR OFFICE/OUTPT VISIT, EST, LEVL IV, 30-39 MIN: ICD-10-PCS | Mod: S$GLB,,, | Performed by: INTERNAL MEDICINE

## 2023-09-27 PROCEDURE — 1160F PR REVIEW ALL MEDS BY PRESCRIBER/CLIN PHARMACIST DOCUMENTED: ICD-10-PCS | Mod: CPTII,S$GLB,, | Performed by: INTERNAL MEDICINE

## 2023-09-27 PROCEDURE — 4010F PR ACE/ARB THEARPY RXD/TAKEN: ICD-10-PCS | Mod: CPTII,S$GLB,, | Performed by: INTERNAL MEDICINE

## 2023-09-27 PROCEDURE — 99999 PR PBB SHADOW E&M-EST. PATIENT-LVL III: CPT | Mod: PBBFAC,,, | Performed by: INTERNAL MEDICINE

## 2023-09-27 PROCEDURE — 3008F PR BODY MASS INDEX (BMI) DOCUMENTED: ICD-10-PCS | Mod: CPTII,S$GLB,, | Performed by: INTERNAL MEDICINE

## 2023-09-27 PROCEDURE — 3078F DIAST BP <80 MM HG: CPT | Mod: CPTII,S$GLB,, | Performed by: INTERNAL MEDICINE

## 2023-09-27 RX ORDER — SUMATRIPTAN 50 MG/1
50 TABLET, FILM COATED ORAL DAILY PRN
Qty: 9 TABLET | Refills: 1 | Status: SHIPPED | OUTPATIENT
Start: 2023-09-27 | End: 2023-10-27

## 2023-09-27 NOTE — PROGRESS NOTES
Assessment and Plan:    1. Migraine with aura and without status migrainosus, not intractable  Discussed that it is okay to take the sumatriptan preemptively when he is getting these vaccines to see if this helps prevent the migraine that he reports he typically gets when he gets a vaccine.  - sumatriptan (IMITREX) 50 MG tablet; Take 1 tablet (50 mg total) by mouth daily as needed for Migraine.  Dispense: 9 tablet; Refill: 1      ______________________________________________________________________  Subjective:    Chief Complaint:  Follow-up PFTs    HPI:  Chad is a 43 y.o. year old male here for follow-up of recent PFTs    He reports that since the last visit his breathing symptoms have entirely resolved.  He has been getting aggressive with using the allergy treatments that we have discussed, and does find this to be helpful.  He did not ever start taking the Breo.  He has not needed albuterol since the last visit.  PFTs came back totally normal.    He does want to discuss migraines.  He is had a history of migraines intermittently for years.  Does note that recently he tends to get a migraine about half an hour after every time he gets a vaccine.  He is planning on getting the flu shot next week in a COVID shot 2 weeks after that.  He wanted to discuss treatment options.      Medications:  Current Outpatient Medications on File Prior to Visit   Medication Sig Dispense Refill    amLODIPine (NORVASC) 5 MG tablet Take 1 tablet (5 mg total) by mouth once daily. 90 tablet 1    fluticasone furoate-vilanteroL (BREO) 100-25 mcg/dose diskus inhaler Inhale 1 puff into the lungs once daily. Controller 60 each 2    levocetirizine (XYZAL) 5 MG tablet Take 5 mg by mouth every evening.      potassium chloride (KLOR-CON) 10 MEQ TbSR TAKE 1 TABLET(10 MEQ) BY MOUTH TWICE DAILY 180 tablet 1    PROAIR RESPICLICK 90 mcg/actuation inhaler Inhale 2 puffs into the lungs every 6 (six) hours as needed for Wheezing or Shortness of Breath. 1  "each 1    sildenafiL (VIAGRA) 50 MG tablet Take 1 tablet (50 mg total) by mouth daily as needed for Erectile Dysfunction. 30 tablet 0    sumatriptan (IMITREX) 50 MG tablet Take 1 tablet (50 mg total) by mouth daily as needed for Migraine. 9 tablet 1    valsartan (DIOVAN) 320 MG tablet Take 1 tablet (320 mg total) by mouth once daily. 90 tablet 1    azelastine (ASTELIN) 137 mcg (0.1 %) nasal spray 1 spray (137 mcg total) by Nasal route 2 (two) times daily. (Patient not taking: Reported on 9/27/2023) 30 mL 0    diclofenac (VOLTAREN) 75 MG EC tablet Take 1 tablet (75 mg total) by mouth 2 (two) times daily with meals. (Patient not taking: Reported on 9/5/2023) 60 tablet 2     No current facility-administered medications on file prior to visit.       Review of Systems:  Review of Systems   Constitutional:  Negative for chills and fever.   Respiratory:  Negative for cough, chest tightness, shortness of breath and wheezing.    Allergic/Immunologic: Positive for environmental allergies.       Past Medical History:  Past Medical History:   Diagnosis Date    Asthma     exercise induced    Cardiac murmur     Dyslipidemia     Hypertension     Obesity     PONV (postoperative nausea and vomiting)     Seasonal and perennial allergic rhinitis        Objective:    Vitals:  Vitals:    09/27/23 1130   BP: 132/74   Pulse: (!) 58   Resp: 16   SpO2: 99%   Weight: 122.8 kg (270 lb 13.4 oz)   Height: 5' 11" (1.803 m)       Physical Exam  Vitals reviewed.   Constitutional:       General: He is not in acute distress.     Appearance: He is well-developed.   Eyes:      Conjunctiva/sclera: Conjunctivae normal.   Cardiovascular:      Rate and Rhythm: Normal rate and regular rhythm.   Pulmonary:      Effort: Pulmonary effort is normal. No respiratory distress.   Skin:     General: Skin is warm and dry.   Neurological:      Mental Status: He is alert and oriented to person, place, and time.   Psychiatric:         Mood and Affect: Mood normal.    "      Behavior: Behavior normal.         Data:  Potassium 3.7 on recent labs    Bronwyn Brandt MD  Internal Medicine

## 2023-11-21 DIAGNOSIS — I10 HYPERTENSION, UNSPECIFIED TYPE: ICD-10-CM

## 2023-11-21 DIAGNOSIS — Z91.09 ENVIRONMENTAL ALLERGIES: ICD-10-CM

## 2023-11-21 RX ORDER — AMLODIPINE BESYLATE 5 MG/1
5 TABLET ORAL
Qty: 90 TABLET | Refills: 3 | Status: SHIPPED | OUTPATIENT
Start: 2023-11-21

## 2023-11-21 RX ORDER — AZELASTINE 1 MG/ML
SPRAY, METERED NASAL
Qty: 90 ML | Refills: 3 | Status: SHIPPED | OUTPATIENT
Start: 2023-11-21

## 2023-11-21 RX ORDER — VALSARTAN 320 MG/1
320 TABLET ORAL
Qty: 90 TABLET | Refills: 3 | Status: SHIPPED | OUTPATIENT
Start: 2023-11-21

## 2023-11-21 NOTE — TELEPHONE ENCOUNTER
No care due was identified.  [facility] Embedded Care Due Messages. Reference number: [id number].   11/21/2023 2:19:34 PM CST

## 2023-11-21 NOTE — TELEPHONE ENCOUNTER
Refill Routing Note   Medication(s) are not appropriate for processing by [facility] for the following reason(s):        New or recently adjusted medication    [facility] action(s):  Defer  Approve               Appointments  past 12m or future 3m with PCP    Date Provider   Last Visit   9/27/2023 Bronwyn Brandt MD   Next Visit   Visit date not found Bronwyn Brandt MD   ED visits in past 90 days: 0        Note composed:4:16 PM 11/21/2023

## 2023-12-28 DIAGNOSIS — N52.9 ERECTILE DYSFUNCTION, UNSPECIFIED ERECTILE DYSFUNCTION TYPE: ICD-10-CM

## 2023-12-28 NOTE — TELEPHONE ENCOUNTER
No care due was identified.  Health [facility] Embedded Care Due Messages. Reference number: [id number].   12/28/2023 11:54:39 AM CST

## 2023-12-29 RX ORDER — SILDENAFIL 50 MG/1
50 TABLET, FILM COATED ORAL DAILY PRN
Qty: 30 TABLET | Refills: 0 | Status: SHIPPED | OUTPATIENT
Start: 2023-12-29 | End: 2024-01-22

## 2024-01-08 ENCOUNTER — PATIENT MESSAGE (OUTPATIENT)
Dept: ADMINISTRATIVE | Facility: HOSPITAL | Age: 44
End: 2024-01-08
Payer: COMMERCIAL

## 2024-01-09 ENCOUNTER — PATIENT OUTREACH (OUTPATIENT)
Dept: ADMINISTRATIVE | Facility: HOSPITAL | Age: 44
End: 2024-01-09
Payer: COMMERCIAL

## 2024-01-09 ENCOUNTER — PATIENT MESSAGE (OUTPATIENT)
Dept: ADMINISTRATIVE | Facility: HOSPITAL | Age: 44
End: 2024-01-09
Payer: COMMERCIAL

## 2024-01-09 NOTE — PROGRESS NOTES
Population Health Chart Review & Patient Outreach Details    Outreach Performed: YES Portal    Additional [facility] Health Notes:           Updates Requested / Reviewed:      Updated Care Coordination Note and Immunizations Reconciliation Completed or Queried: [place]         Health Maintenance Topics Overdue:    Health Maintenance Due   Topic Date Due    Colorectal Cancer Screening  03/20/2024         Health Maintenance Topic(s) Outreach Outcomes & Actions Taken:    Colorectal Cancer Screening - Outreach Outcomes & Actions Taken  :

## 2024-02-04 ENCOUNTER — PATIENT MESSAGE (OUTPATIENT)
Dept: FAMILY MEDICINE | Facility: CLINIC | Age: 44
End: 2024-02-04
Payer: COMMERCIAL

## 2024-02-04 DIAGNOSIS — Z00.00 PREVENTATIVE HEALTH CARE: Primary | ICD-10-CM

## 2024-02-04 DIAGNOSIS — E29.1 HYPOGONADISM IN MALE: ICD-10-CM

## 2024-02-09 ENCOUNTER — LAB VISIT (OUTPATIENT)
Dept: LAB | Facility: HOSPITAL | Age: 44
End: 2024-02-09
Attending: INTERNAL MEDICINE
Payer: COMMERCIAL

## 2024-02-09 DIAGNOSIS — E29.1 HYPOGONADISM IN MALE: ICD-10-CM

## 2024-02-09 DIAGNOSIS — Z00.00 PREVENTATIVE HEALTH CARE: ICD-10-CM

## 2024-02-09 LAB
ALBUMIN SERPL BCP-MCNC: 3.7 G/DL (ref 3.5–5.2)
ALP SERPL-CCNC: 96 U/L (ref 55–135)
ALT SERPL W/O P-5'-P-CCNC: 28 U/L (ref 10–44)
ANION GAP SERPL CALC-SCNC: 10 MMOL/L (ref 8–16)
AST SERPL-CCNC: 20 U/L (ref 10–40)
BASOPHILS # BLD AUTO: 0.04 K/UL (ref 0–0.2)
BASOPHILS NFR BLD: 0.5 % (ref 0–1.9)
BILIRUB SERPL-MCNC: 0.4 MG/DL (ref 0.1–1)
BUN SERPL-MCNC: 18 MG/DL (ref 6–20)
CALCIUM SERPL-MCNC: 9.4 MG/DL (ref 8.7–10.5)
CHLORIDE SERPL-SCNC: 108 MMOL/L (ref 95–110)
CHOLEST SERPL-MCNC: 174 MG/DL (ref 120–199)
CHOLEST/HDLC SERPL: 5.3 {RATIO} (ref 2–5)
CO2 SERPL-SCNC: 23 MMOL/L (ref 23–29)
COMPLEXED PSA SERPL-MCNC: 0.41 NG/ML (ref 0–4)
CREAT SERPL-MCNC: 1.2 MG/DL (ref 0.5–1.4)
DIFFERENTIAL METHOD BLD: ABNORMAL
EOSINOPHIL # BLD AUTO: 0.3 K/UL (ref 0–0.5)
EOSINOPHIL NFR BLD: 4.3 % (ref 0–8)
ERYTHROCYTE [DISTWIDTH] IN BLOOD BY AUTOMATED COUNT: 14.3 % (ref 11.5–14.5)
EST. GFR  (NO RACE VARIABLE): >60 ML/MIN/1.73 M^2
ESTIMATED AVG GLUCOSE: 105 MG/DL (ref 68–131)
GLUCOSE SERPL-MCNC: 99 MG/DL (ref 70–110)
HBA1C MFR BLD: 5.3 % (ref 4–5.6)
HCT VFR BLD AUTO: 40.9 % (ref 40–54)
HDLC SERPL-MCNC: 33 MG/DL (ref 40–75)
HDLC SERPL: 19 % (ref 20–50)
HGB BLD-MCNC: 13 G/DL (ref 14–18)
IMM GRANULOCYTES # BLD AUTO: 0.01 K/UL (ref 0–0.04)
IMM GRANULOCYTES NFR BLD AUTO: 0.1 % (ref 0–0.5)
LDLC SERPL CALC-MCNC: 112.2 MG/DL (ref 63–159)
LYMPHOCYTES # BLD AUTO: 2.4 K/UL (ref 1–4.8)
LYMPHOCYTES NFR BLD: 31.2 % (ref 18–48)
MCH RBC QN AUTO: 27 PG (ref 27–31)
MCHC RBC AUTO-ENTMCNC: 31.8 G/DL (ref 32–36)
MCV RBC AUTO: 85 FL (ref 82–98)
MONOCYTES # BLD AUTO: 0.7 K/UL (ref 0.3–1)
MONOCYTES NFR BLD: 8.7 % (ref 4–15)
NEUTROPHILS # BLD AUTO: 4.3 K/UL (ref 1.8–7.7)
NEUTROPHILS NFR BLD: 55.2 % (ref 38–73)
NONHDLC SERPL-MCNC: 141 MG/DL
NRBC BLD-RTO: 0 /100 WBC
PLATELET # BLD AUTO: 297 K/UL (ref 150–450)
PMV BLD AUTO: 10.1 FL (ref 9.2–12.9)
POTASSIUM SERPL-SCNC: 3.8 MMOL/L (ref 3.5–5.1)
PROT SERPL-MCNC: 6.8 G/DL (ref 6–8.4)
RBC # BLD AUTO: 4.81 M/UL (ref 4.6–6.2)
SODIUM SERPL-SCNC: 141 MMOL/L (ref 136–145)
TESTOST SERPL-MCNC: 317 NG/DL (ref 304–1227)
TRIGL SERPL-MCNC: 144 MG/DL (ref 30–150)
TSH SERPL DL<=0.005 MIU/L-ACNC: 1.15 UIU/ML (ref 0.4–4)
WBC # BLD AUTO: 7.73 K/UL (ref 3.9–12.7)

## 2024-02-09 PROCEDURE — 80053 COMPREHEN METABOLIC PANEL: CPT | Performed by: INTERNAL MEDICINE

## 2024-02-09 PROCEDURE — 84153 ASSAY OF PSA TOTAL: CPT | Performed by: INTERNAL MEDICINE

## 2024-02-09 PROCEDURE — 80061 LIPID PANEL: CPT | Performed by: INTERNAL MEDICINE

## 2024-02-09 PROCEDURE — 84443 ASSAY THYROID STIM HORMONE: CPT | Performed by: INTERNAL MEDICINE

## 2024-02-09 PROCEDURE — 36415 COLL VENOUS BLD VENIPUNCTURE: CPT | Mod: PN | Performed by: INTERNAL MEDICINE

## 2024-02-09 PROCEDURE — 84403 ASSAY OF TOTAL TESTOSTERONE: CPT | Performed by: INTERNAL MEDICINE

## 2024-02-09 PROCEDURE — 85025 COMPLETE CBC W/AUTO DIFF WBC: CPT | Performed by: INTERNAL MEDICINE

## 2024-02-09 PROCEDURE — 83036 HEMOGLOBIN GLYCOSYLATED A1C: CPT | Performed by: INTERNAL MEDICINE

## 2024-02-21 ENCOUNTER — OFFICE VISIT (OUTPATIENT)
Dept: FAMILY MEDICINE | Facility: CLINIC | Age: 44
End: 2024-02-21
Payer: COMMERCIAL

## 2024-02-21 VITALS
DIASTOLIC BLOOD PRESSURE: 84 MMHG | HEART RATE: 65 BPM | BODY MASS INDEX: 37.75 KG/M2 | OXYGEN SATURATION: 98 % | HEIGHT: 71 IN | WEIGHT: 269.63 LBS | SYSTOLIC BLOOD PRESSURE: 132 MMHG

## 2024-02-21 DIAGNOSIS — Z00.00 PREVENTATIVE HEALTH CARE: Primary | ICD-10-CM

## 2024-02-21 DIAGNOSIS — E29.1 HYPOGONADISM IN MALE: ICD-10-CM

## 2024-02-21 DIAGNOSIS — I10 ESSENTIAL HYPERTENSION: ICD-10-CM

## 2024-02-21 DIAGNOSIS — E87.6 HYPOKALEMIA: ICD-10-CM

## 2024-02-21 PROCEDURE — 99396 PREV VISIT EST AGE 40-64: CPT | Mod: S$GLB,,, | Performed by: INTERNAL MEDICINE

## 2024-02-21 PROCEDURE — 1159F MED LIST DOCD IN RCRD: CPT | Mod: CPTII,S$GLB,, | Performed by: INTERNAL MEDICINE

## 2024-02-21 PROCEDURE — 3044F HG A1C LEVEL LT 7.0%: CPT | Mod: CPTII,S$GLB,, | Performed by: INTERNAL MEDICINE

## 2024-02-21 PROCEDURE — 3075F SYST BP GE 130 - 139MM HG: CPT | Mod: CPTII,S$GLB,, | Performed by: INTERNAL MEDICINE

## 2024-02-21 PROCEDURE — 3008F BODY MASS INDEX DOCD: CPT | Mod: CPTII,S$GLB,, | Performed by: INTERNAL MEDICINE

## 2024-02-21 PROCEDURE — 3079F DIAST BP 80-89 MM HG: CPT | Mod: CPTII,S$GLB,, | Performed by: INTERNAL MEDICINE

## 2024-02-21 PROCEDURE — 99999 PR PBB SHADOW E&M-EST. PATIENT-LVL IV: CPT | Mod: PBBFAC,,, | Performed by: INTERNAL MEDICINE

## 2024-02-21 PROCEDURE — 1160F RVW MEDS BY RX/DR IN RCRD: CPT | Mod: CPTII,S$GLB,, | Performed by: INTERNAL MEDICINE

## 2024-02-21 RX ORDER — POTASSIUM CHLORIDE 750 MG/1
10 TABLET, EXTENDED RELEASE ORAL 2 TIMES DAILY
Qty: 180 TABLET | Refills: 1 | Status: SHIPPED | OUTPATIENT
Start: 2024-02-21 | End: 2024-05-20

## 2024-02-21 NOTE — PROGRESS NOTES
Assessment and Plan:    1. Preventative health care  Discussed age appropriate preventative healthcare items such as cancer screenings and recommended immunizations. Discussed whether patient is using tobacco, alcohol, or illicit drugs and any concerns were discussed. Discussed maintenance of a healthy weight. Patient queried if he has any additional questions about preventative healthcare and all questions were answered.  - Cologuard Screening (Multitarget Stool DNA); Future  - Cologuard Screening (Multitarget Stool DNA)    2. Hypogonadism in male  - Testosterone; Future    3. Essential hypertension  - Basic Metabolic Panel; Future    4. Hypokalemia  - potassium chloride (KLOR-CON) 10 MEQ TbSR; Take 1 tablet (10 mEq total) by mouth 2 (two) times daily.  Dispense: 180 tablet; Refill: 1    ______________________________________________________________________    Subjective:    Chief Complaint:  Annual exam    HPI:  Chad is a 44 y.o. year old patient here for annual exam.     Has been working on increased exercise and watching portions.    Asthma- Using albuterol PRN.     HTN- Controlled with valsartan 320 and amlodipine 5. Has been controlled at home.     Dyslipidemia- LDL improved on last labs, but HDL still low and TGs still borderline high.     Testosterone- Low on last lab in Feb. Improved on recent labs.     ED- Using sildenafil PRN.     Hypokalemia- Taking 10 mEq BID. Recent lab with K 3.8.    Migraine- Prescribed imitrex last visit. Typically only needed this after getting a vaccines. Did end up helping.    Past Medical History:  Past Medical History:   Diagnosis Date    Asthma     exercise induced    Cardiac murmur     Dyslipidemia     Hypertension     Obesity     PONV (postoperative nausea and vomiting)     Seasonal and perennial allergic rhinitis        Past Surgical History:  Past Surgical History:   Procedure Laterality Date    ADENOIDECTOMY  2004    CARPAL TUNNEL RELEASE Left 12/17/2020    Procedure:  RELEASE, CARPAL TUNNEL;  Surgeon: Dre Montanez MD;  Location: [facility] OR;  Service: Orthopedics;  Laterality: Left;  Procedure:  Left carpal tunnel release    Position:  Supine    Anesthesia:  General equipment:  Carpal tunnel Set    LAPAROSCOPIC CHOLECYSTECTOMY      laparoscopic removal gallstones    TONSILLECTOMY  2004    corrective septoplasty same time.    TRIGGER FINGER RELEASE Right 3/31/2022    Procedure: Right middle finger trigger release;  Surgeon: Dre Montanez MD;  Location: [facility] OR;  Service: Orthopedics;  Laterality: Right;       Family History:  Family History   Problem Relation Age of Onset    Cancer Mother         triple breast cancer at 2 different times.     Breast cancer Mother     Heart disease Father         2V CABG at 45; 12 coronary stents.    Hyperlipidemia Father     Mental illness Father         MIKALA    Stroke Father         traumatic CVA    Vision loss Father         very poor vision    Cancer Maternal Grandfather          from melanoma at his 60's.    Early death Maternal Grandfather          early 60's of melanoma    Melanoma Maternal Grandfather     Diabetes Paternal Grandmother     Hypertension Paternal Grandfather     Diabetes Maternal Uncle     Hypertension Paternal Uncle        Social History:  Social History     Socioeconomic History    Marital status:    Occupational History    Occupation:  for [employer].    Tobacco Use    Smoking status: Never    Smokeless tobacco: Never   Substance and Sexual Activity    Alcohol use: Not Currently     Comment: no alcohol for 8 months as of 3/31/22    Drug use: Yes     Types: Marijuana     Social Determinants of Health     Financial Resource Strain: Low Risk  (2024)    Overall Financial Resource Strain (CARDIA)     Difficulty of Paying Living Expenses: Not hard at all   Food Insecurity: No Food Insecurity (2024)    Hunger Vital Sign     Worried About Running Out of Food in the Last Year:  Never true     Ran Out of Food in the Last Year: Never true   Transportation Needs: No Transportation Needs (2/20/2024)    PRAPARE - Transportation     Lack of Transportation (Medical): No     Lack of Transportation (Non-Medical): No   Physical Activity: Insufficiently Active (2/20/2024)    Exercise Vital Sign     Days of Exercise per Week: 3 days     Minutes of Exercise per Session: 40 min   Stress: Stress Concern Present (2/20/2024)    [employer] - Occupational Stress Questionnaire     Feeling of Stress : To some extent   Social Connections: Unknown (2/20/2024)    Social Connection and Isolation Panel [NHANES]     Frequency of Communication with Friends and Family: Never     Frequency of Social Gatherings with Friends and Family: Once a week     Active Member of Clubs or Organizations: No     Attends Club or Organization Meetings: Never     Marital Status:    Housing Stability: Low Risk  (2/20/2024)    Housing Stability Vital Sign     Unable to Pay for Housing in the Last Year: No     Number of Places Lived in the Last Year: 1     Unstable Housing in the Last Year: No       Medications:  Current Outpatient Medications on File Prior to Visit   Medication Sig Dispense Refill    amLODIPine (NORVASC) 5 MG tablet TAKE 1 TABLET(5 MG) BY MOUTH EVERY DAY 90 tablet 3    azelastine (ASTELIN) 137 mcg (0.1 %) nasal spray USE 1 SPRAY INTO EACH NOSTRIL TWICE DAILY 90 mL 3    levocetirizine (XYZAL) 5 MG tablet Take 5 mg by mouth every evening.      potassium chloride (KLOR-CON) 10 MEQ TbSR TAKE 1 TABLET(10 MEQ) BY MOUTH TWICE DAILY 180 tablet 1    PROAIR RESPICLICK 90 mcg/actuation inhaler Inhale 2 puffs into the lungs every 6 (six) hours as needed for Wheezing or Shortness of Breath. 1 each 1    sildenafiL (VIAGRA) 50 MG tablet Take 1 tablet by mouth daily as needed for erectile dysfunction. 30 tablet 9    valsartan (DIOVAN) 320 MG tablet TAKE 1 TABLET(320 MG) BY MOUTH EVERY DAY 90 tablet 3  "   sumatriptan (IMITREX) 50 MG tablet Take 1 tablet (50 mg total) by mouth daily as needed for Migraine. 9 tablet 1     No current facility-administered medications on file prior to visit.       Allergies:  Inderal [propranolol] and Lisinopril    Immunizations:  Immunization History   Administered Date(s) Administered    COVID-19, MRNA, LN-S, PF (Pfizer) (Purple Cap) 11/01/2021    COVID-19, mRNA, LNP-S, PF, perry-sucrose, 30 mcg/0.3 mL (Pfizer 2023 Ages 12+) 11/03/2023    COVID-19, mRNA, LNP-S, bivalent booster, PF (PFIZER OMICRON) 10/11/2022    COVID-19, vector-nr, rS-Ad26, PF (Caregivers) 03/10/2021    Influenza 11/15/2021, 09/21/2022    Influenza - Quadrivalent - PF *Preferred* (6 months and older) 10/14/2019, 09/14/2020, 10/07/2023    Pneumococcal Polysaccharide - 23 Valent 01/01/2010    Tdap 05/29/2009, 05/22/2016       Review of Systems:  Review of Systems   Constitutional:  Positive for activity change. Negative for unexpected weight change.   HENT:  Negative for hearing loss, rhinorrhea and trouble swallowing.    Eyes:  Positive for visual disturbance. Negative for discharge.   Respiratory:  Negative for chest tightness and wheezing.    Cardiovascular:  Negative for chest pain and palpitations.   Gastrointestinal:  Negative for blood in stool, constipation, diarrhea and vomiting.   Endocrine: Negative for polydipsia and polyuria.   Genitourinary:  Negative for difficulty urinating, hematuria and urgency.   Musculoskeletal:  Positive for arthralgias and neck pain. Negative for joint swelling.   Neurological:  Negative for weakness and headaches.   Psychiatric/Behavioral:  Positive for dysphoric mood. Negative for confusion.      Entered by patient and reviewed and updated during visit      Objective:    Vitals:  Vitals:    02/21/24 1513 02/21/24 1535   BP: (!) 144/80 132/84   Pulse: 65    SpO2: 98%    Weight: 122.3 kg (269 lb 10 oz)    Height: 5' 11" (1.803 m)    PainSc:   4        Physical Exam  Vitals reviewed. "   Constitutional:       General: He is not in acute distress.     Appearance: He is well-developed. He is not diaphoretic.   HENT:      Mouth/Throat:      Pharynx: No oropharyngeal exudate.   Eyes:      General: No scleral icterus.        Right eye: No discharge.         Left eye: No discharge.      Conjunctiva/sclera: Conjunctivae normal.   Neck:      Thyroid: No thyromegaly.      Trachea: No tracheal deviation.   Cardiovascular:      Rate and Rhythm: Normal rate and regular rhythm.      Heart sounds: Normal heart sounds. No murmur heard.  Pulmonary:      Effort: Pulmonary effort is normal. No respiratory distress.      Breath sounds: Normal breath sounds. No wheezing or rales.   Abdominal:      General: Bowel sounds are normal. There is no distension.      Palpations: Abdomen is soft.      Tenderness: There is no abdominal tenderness. There is no guarding or rebound.   Musculoskeletal:      Cervical back: Neck supple.      Right lower leg: No edema.      Left lower leg: No edema.   Lymphadenopathy:      Cervical: No cervical adenopathy.   Skin:     General: Skin is warm and dry.   Neurological:      Mental Status: He is alert and oriented to person, place, and time.   Psychiatric:         Behavior: Behavior normal.         Judgment: Judgment normal.      Comments: Calm and cooperative         Data:        Bronwyn Brandt MD  Internal Medicine

## 2024-03-11 ENCOUNTER — PATIENT MESSAGE (OUTPATIENT)
Dept: FAMILY MEDICINE | Facility: CLINIC | Age: 44
End: 2024-03-11
Payer: COMMERCIAL

## 2024-03-11 ENCOUNTER — OFFICE VISIT (OUTPATIENT)
Dept: URGENT CARE | Facility: CLINIC | Age: 44
End: 2024-03-11
Payer: COMMERCIAL

## 2024-03-11 VITALS
OXYGEN SATURATION: 98 % | WEIGHT: 269 LBS | SYSTOLIC BLOOD PRESSURE: 135 MMHG | DIASTOLIC BLOOD PRESSURE: 86 MMHG | TEMPERATURE: 98 F | BODY MASS INDEX: 37.66 KG/M2 | RESPIRATION RATE: 15 BRPM | HEART RATE: 58 BPM | HEIGHT: 71 IN

## 2024-03-11 DIAGNOSIS — M54.2 CERVICALGIA: ICD-10-CM

## 2024-03-11 DIAGNOSIS — M62.838 MUSCLE SPASM: ICD-10-CM

## 2024-03-11 DIAGNOSIS — M54.6 THORACIC SPINE PAIN: ICD-10-CM
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
## 2024-03-11 PROBLEM — I10 BENIGN ESSENTIAL HYPERTENSION: Status: ACTIVE | Noted: 2017-09-05

## 2024-03-11 PROBLEM — Z62.810 H/O PHYSICAL AND SEXUAL ABUSE IN CHILDHOOD: Chronic | Status: ACTIVE | Noted: 2017-09-05

## 2024-03-11 PROBLEM — M48.02 SPINAL STENOSIS IN CERVICAL REGION: Status: ACTIVE | Noted: 2018-04-19

## 2024-03-11 PROBLEM — E78.1 HYPERTRIGLYCERIDEMIA WITHOUT HYPERCHOLESTEROLEMIA: Chronic | Status: ACTIVE | Noted: 2017-09-05

## 2024-03-11 PROBLEM — G89.29 CHRONIC MIDLINE THORACIC BACK PAIN: Chronic | Status: ACTIVE | Noted: 2017-09-05

## 2024-03-11 PROBLEM — G89.29 CHRONIC MIDLINE POSTERIOR NECK PAIN: Chronic | Status: ACTIVE | Noted: 2017-09-05

## 2024-03-11 PROBLEM — Z77.22 HISTORY OF SECOND HAND SMOKE EXPOSURE: Chronic | Status: ACTIVE | Noted: 2017-09-05

## 2024-03-11 PROBLEM — M47.22 OSTEOARTHRITIS OF SPINE WITH RADICULOPATHY, CERVICAL REGION: Status: ACTIVE | Noted: 2018-04-19

## 2024-03-11 PROCEDURE — 99213 OFFICE O/P EST LOW 20 MIN: CPT | Mod: 25,S$GLB,, | Performed by: PHYSICIAN ASSISTANT

## 2024-03-11 PROCEDURE — 72040 X-RAY EXAM NECK SPINE 2-3 VW: CPT | Mod: S$GLB,,, | Performed by: RADIOLOGY

## 2024-03-11 PROCEDURE — 96372 THER/PROPH/DIAG INJ SC/IM: CPT | Mod: S$GLB,,, | Performed by: PHYSICIAN ASSISTANT

## 2024-03-11 PROCEDURE — 72070 X-RAY EXAM THORAC SPINE 2VWS: CPT | Mod: S$GLB,,, | Performed by: RADIOLOGY

## 2024-03-11 RX ORDER — KETOROLAC TROMETHAMINE 30 MG/ML
30 INJECTION, SOLUTION INTRAMUSCULAR; INTRAVENOUS
Status: COMPLETED | OUTPATIENT
Start: 2024-03-11 | End: 2024-03-11

## 2024-03-11 RX ORDER — MELOXICAM 15 MG/1
15 TABLET ORAL DAILY
Qty: 7 TABLET | Refills: 0 | Status: SHIPPED | OUTPATIENT
Start: 2024-03-11 | End: 2024-03-18

## 2024-03-11 RX ORDER — CYCLOBENZAPRINE HCL 10 MG
10 TABLET ORAL 3 TIMES DAILY PRN
Qty: 30 TABLET | Refills: 0 | Status: SHIPPED | OUTPATIENT
Start: 2024-03-11 | End: 2024-03-21

## 2024-03-11 RX ADMIN — KETOROLAC TROMETHAMINE 30 MG: 30 INJECTION, SOLUTION INTRAMUSCULAR; INTRAVENOUS at 05:03

## 2024-03-11 NOTE — PATIENT INSTRUCTIONS
You were given a Toradol injection today do not start Mobic until tomorrow make sure to take with food and as prescribed.  Do not take Advil or ibuprofen with this medication Tylenol is safe.  Ice for 15 minutes at a time 4 to 5 times a day.  X-ray reveals no fractures.  Use muscle relaxer as needed.  This can be sedating please do not drink alcohol or drive with this medication.  Follow up with primary care as needed
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
## 2024-03-11 NOTE — LETTER
March 11, 2024      [facility] [address] , [address]  [facility] [address]  Phone: [phone]  Fax: [phone]       Patient: Chad Chen   YOB: 1980  Date of Visit: 03/11/2024    To Whom It May Concern:    Adalberto Chen  was at [facility] on 03/11/2024. The patient may return to work/school on 03/12/2024 with no restrictions. Pt is being treated with sedating medications. If you have any questions or concerns, or if I can be of further assistance, please do not hesitate to contact me.    Sincerely,      Queenie Brown PA-C

## 2024-03-13 LAB — NONINV COLON CA DNA+OCC BLD SCRN STL QL: NEGATIVE

## 2024-06-26 NOTE — TELEPHONE ENCOUNTER
History Of Present Illness  Chante Montalvo is a 79 y.o. female presenting with post-op refractive surprise after cataract extraction, IOL insertion of the left eye.     Past Medical History  Past Medical History:   Diagnosis Date    Cardiac arrhythmia, unspecified 11/21/2016    Arrhythmia, sinus node    Combined form of age-related cataract, both eyes     Hyperlipidemia, unspecified 12/09/2022    Borderline hyperlipidemia    Other amnesia 01/04/2020    Memory difficulties    Other skin changes 12/04/2017    Skin irritation    Other specified health status 11/13/2014    No known problems    Personal history of other diseases of the respiratory system 01/31/2019    History of paranasal sinus congestion    Personal history of other malignant neoplasm of skin 12/07/2020    History of basal cell carcinoma (BCC)    Personal history of other specified conditions 12/30/2019    History of multiple pulmonary nodules       Surgical History  Past Surgical History:   Procedure Laterality Date    CATARACT EXTRACTION Left     COLONOSCOPY      OTHER SURGICAL HISTORY  11/13/2014    Prior Surgical Procedure Not Done    OTHER SURGICAL HISTORY  12/09/2022    Mohs surgery        Social History  She reports that she quit smoking about 6 years ago. Her smoking use included cigarettes. She started smoking about 26 years ago. She has a 10 pack-year smoking history. She has been exposed to tobacco smoke. She has never used smokeless tobacco. She reports that she does not drink alcohol and does not use drugs.    Family History  No family history on file.     Allergies  Patient has no known allergies.    Review of Systems  No new flashes, floaters, eye pain, sudden vision loss, double vision, redness, or drainage.       Physical Exam  Awake and alert  Warm and well-perfused   Unlabored respirations on room air      Last Recorded Vitals  Weight 62.1 kg (137 lb), not currently breastfeeding.     Assessment/Plan   Principal Problem:    Hyperopia  No new care gaps identified.  [facility] Embedded Care Gaps. Reference number: [id number]. 11/14/2022   12:27:02 PM CST   with presbyopia of left eye      Plan:   IOL exchange of the left eye            Karen Perdomo MD

## 2024-07-03 ENCOUNTER — PATIENT MESSAGE (OUTPATIENT)
Dept: ORTHOPEDICS | Facility: CLINIC | Age: 44
End: 2024-07-03
Payer: COMMERCIAL

## 2024-07-03 RX ORDER — MELOXICAM 15 MG/1
15 TABLET ORAL DAILY
Qty: 30 TABLET | Refills: 0 | Status: SHIPPED | OUTPATIENT
Start: 2024-07-03

## 2024-07-19 ENCOUNTER — TELEPHONE (OUTPATIENT)
Dept: ORTHOPEDICS | Facility: CLINIC | Age: 44
End: 2024-07-19
Payer: COMMERCIAL

## 2024-07-19 NOTE — TELEPHONE ENCOUNTER
Attempted to contact. LM explaining that Dr. Thao does not remove bone spurs. Cancelled appointment. Advised to reschedule appointment with Dr. Helton.

## 2024-07-22 ENCOUNTER — PATIENT MESSAGE (OUTPATIENT)
Dept: ORTHOPEDICS | Facility: CLINIC | Age: 44
End: 2024-07-22
Payer: COMMERCIAL

## 2024-07-22 DIAGNOSIS — M79.671 RIGHT FOOT PAIN: Primary | ICD-10-CM

## 2024-07-23 ENCOUNTER — OFFICE VISIT (OUTPATIENT)
Dept: ORTHOPEDICS | Facility: CLINIC | Age: 44
End: 2024-07-23
Payer: COMMERCIAL

## 2024-07-23 ENCOUNTER — HOSPITAL ENCOUNTER (OUTPATIENT)
Dept: RADIOLOGY | Facility: HOSPITAL | Age: 44
Discharge: HOME | End: 2024-07-23
Attending: ORTHOPAEDIC SURGERY
Payer: COMMERCIAL

## 2024-07-23 DIAGNOSIS — M89.9 BONE DISORDER: Primary | ICD-10-CM

## 2024-07-23 DIAGNOSIS — M79.671 RIGHT FOOT PAIN: ICD-10-CM

## 2024-07-23 DIAGNOSIS — Q79.8 CONGENITAL CONTRACTURE OF RIGHT GASTROCNEMIUS MUSCLE: ICD-10-CM

## 2024-07-23 DIAGNOSIS — M76.60 INSERTIONAL ACHILLES TENDINOPATHY: Primary | ICD-10-CM

## 2024-07-23 PROCEDURE — 73630 X-RAY EXAM OF FOOT: CPT | Mod: TC,PO,RT

## 2024-07-23 PROCEDURE — 1159F MED LIST DOCD IN RCRD: CPT | Mod: CPTII,S$GLB,, | Performed by: ORTHOPAEDIC SURGERY

## 2024-07-23 PROCEDURE — 99999 PR PBB SHADOW E&M-EST. PATIENT-LVL III: CPT | Mod: PBBFAC,,, | Performed by: ORTHOPAEDIC SURGERY

## 2024-07-23 PROCEDURE — 73630 X-RAY EXAM OF FOOT: CPT | Mod: 26,RT,, | Performed by: RADIOLOGY

## 2024-07-23 PROCEDURE — 99214 OFFICE O/P EST MOD 30 MIN: CPT | Mod: S$GLB,,, | Performed by: ORTHOPAEDIC SURGERY

## 2024-07-23 PROCEDURE — 3044F HG A1C LEVEL LT 7.0%: CPT | Mod: CPTII,S$GLB,, | Performed by: ORTHOPAEDIC SURGERY

## 2024-07-23 PROCEDURE — 4010F ACE/ARB THERAPY RXD/TAKEN: CPT | Mod: CPTII,S$GLB,, | Performed by: ORTHOPAEDIC SURGERY

## 2024-07-23 PROCEDURE — 1160F RVW MEDS BY RX/DR IN RCRD: CPT | Mod: CPTII,S$GLB,, | Performed by: ORTHOPAEDIC SURGERY

## 2024-07-23 NOTE — PROGRESS NOTES
Status/Diagnosis: Right gastroc contracture and AICT.  Date of Surgery: none  Date of Injury: none; DOO March 2023  Return visit: 2 weeks postop  X-rays on Return: none    Chief Complaint:   Chief Complaint   Patient presents with    Right Foot - Pain     Present History:  Chad Chen is a 44 y.o. male who presents today via referral from Dr. Alex Thao.  Patient endorses an approximately 4 month history worsening right posterior heel pain.  Noticed a bump over the area of concern more recently.  Denies any history of injury or other inciting event.  Patient has minimal pain at rest, increased with weight-bearing and also pain due to irritation from mass effect.  Denies any numbness or tingling.  Recently seen by my partner, Dr. Thao, for evaluation and treatment.  Discussed the use of a heel lift, anti-inflammatories, and physical therapy.  Patient reports that he called to make his 1st appointment with PT however we will not be able to be seen for the next 3 weeks.  Was taking over-the-counter oral ibuprofen as needed for pain intermittently but with minimal relief.  Past medical history significant for hypertension and asthma.  Denies tobacco use.  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    07/23/2024:  Patient returns today for repeat clinical evaluation and to discuss possible surgical intervention.    Still with moderate persistent pain, 7/10.  No new history of injury or other inciting event.    Pain almost exclusively with mass effect.    Specifically mentioned severe pain when wearing shoes with a back but really only while in a seated position as this causes much more irritation than when standing in the same shoes.  Denies any numbness or tingling.      Past Medical History:   Diagnosis Date    Asthma     exercise induced    Cardiac murmur     Dyslipidemia     Hypertension     Obesity     PONV (postoperative nausea and vomiting)     Seasonal and perennial allergic rhinitis        Past Surgical History:    Procedure Laterality Date    ADENOIDECTOMY  2004    CARPAL TUNNEL RELEASE Left 12/17/2020    Procedure: RELEASE, CARPAL TUNNEL;  Surgeon: Dre Montanez MD;  Location: [facility] OR;  Service: Orthopedics;  Laterality: Left;  Procedure:  Left carpal tunnel release    Position:  Supine    Anesthesia:  General equipment:  Carpal tunnel Set    LAPAROSCOPIC CHOLECYSTECTOMY  2010    laparoscopic removal gallstones    TONSILLECTOMY  2004    corrective septoplasty same time.    TRIGGER FINGER RELEASE Right 3/31/2022    Procedure: Right middle finger trigger release;  Surgeon: Dre Montanez MD;  Location: [facility] OR;  Service: Orthopedics;  Laterality: Right;       Current Outpatient Medications   Medication Sig    amLODIPine (NORVASC) 5 MG tablet TAKE 1 TABLET(5 MG) BY MOUTH EVERY DAY    azelastine (ASTELIN) 137 mcg (0.1 %) nasal spray USE 1 SPRAY INTO EACH NOSTRIL TWICE DAILY    levocetirizine (XYZAL) 5 MG tablet Take 5 mg by mouth every evening.    meloxicam (MOBIC) 15 MG tablet Take 1 tablet (15 mg total) by mouth once daily.    potassium chloride (KLOR-CON) 10 MEQ TbSR Take 1 tablet by mouth twice daily.    PROAIR RESPICLICK 90 mcg/actuation inhaler Inhale 2 puffs into the lungs every 6 (six) hours as needed for Wheezing or Shortness of Breath.    sildenafiL (VIAGRA) 50 MG tablet Take 1 tablet by mouth daily as needed for erectile dysfunction.    valsartan (DIOVAN) 320 MG tablet TAKE 1 TABLET(320 MG) BY MOUTH EVERY DAY    sumatriptan (IMITREX) 50 MG tablet Take 1 tablet (50 mg total) by mouth daily as needed for Migraine.     No current facility-administered medications for this visit.       Review of patient's allergies indicates:   Allergen Reactions    Inderal [propranolol] Hives    Lisinopril Other (See Comments)     cough       Family History   Problem Relation Name Age of Onset    Cancer Mother          triple breast cancer at 2 different times.     Breast cancer Mother      Heart disease Father          2V  CABG at 45; 12 coronary stents.    Hyperlipidemia Father      Mental illness Father          MIKALA    Stroke Father          traumatic CVA    Vision loss Father          very poor vision    Cancer Maternal Grandfather           from melanoma at his 60's.    Early death Maternal Grandfather           early 60's of melanoma    Melanoma Maternal Grandfather      Diabetes Paternal Grandmother      Hypertension Paternal Grandfather      Diabetes Maternal Uncle      Hypertension Paternal Uncle         Social History     Socioeconomic History    Marital status:    Occupational History    Occupation:  for sales force.    Tobacco Use    Smoking status: Never    Smokeless tobacco: Never   Substance and Sexual Activity    Alcohol use: Not Currently     Comment: no alcohol for 8 months as of 3/31/22    Drug use: Yes     Types: Marijuana     Social Determinants of Health     Financial Resource Strain: Low Risk  (2024)    Overall Financial Resource Strain (CARDIA)     Difficulty of Paying Living Expenses: Not hard at all   Food Insecurity: No Food Insecurity (2024)    Hunger Vital Sign     Worried About Running Out of Food in the Last Year: Never true     Ran Out of Food in the Last Year: Never true   Transportation Needs: No Transportation Needs (2024)    PRAPARE - Transportation     Lack of Transportation (Medical): No     Lack of Transportation (Non-Medical): No   Physical Activity: Insufficiently Active (2024)    Exercise Vital Sign     Days of Exercise per Week: 3 days     Minutes of Exercise per Session: 40 min   Stress: Stress Concern Present (2024)    [employer] [place] [employer] - Occupational Stress Questionnaire     Feeling of Stress : To some extent   Housing Stability: Low Risk  (2024)    Housing Stability Vital Sign     Unable to Pay for Housing in the Last Year: No     Number of Places Lived in the Last Year: 1     Unstable Housing in the Last  Year: No       Physical exam:  There were no vitals filed for this visit.  There is no height or weight on file to calculate BMI.  General: In no apparent distress; well developed and well nourished.  HEENT: normocephalic; atraumatic.  Cardiovascular: regular rate.  Respiratory: no increased work of breathing.  Musculoskeletal:   Gait: mild antalgic  Inspection:   Patient with large bony prominence over the posterior aspect of the heel at the level of the Achilles insertion.  No skin tenting or breakdown is present.  Moderate tenderness on deep palpation at this site.  No palpable defect along the Achilles tendon.  Good tension with Ramirez testing.  With the patient prone and knees flexed 90°, symmetric resting ankle plantar flexion.  No pain referable to the foot.  No laxity with anterior drawer testing.  Silfverskiold: significant positive  Alignment:  Knee: neutral               Ankle: neutral              Hindfoot: neutral              Forefoot: neutral   Strength:              Dorsiflexion 5/5  Plantar flexion 5/5  Inversion 5/5   Eversion 5/5   Sensation:              SILT distally   ROM:              Ankle: Full and painless.              Subtalar: Painless inversion and eversion   Pulses: 2+ DP/PT pulses.                   Imaging Studies/Outside documentation:  I have ordered/reviewed/interpreted the following images/outside documentation:  1. Weight bearing 3-views of Right foot and ankle:   On my independent review, no acute bony abnormality noted.  Large enthesophyte at the Achilles insertion with adjacent Maurisio deformity.  Congruent ankle mortise.  No significant degenerative joint changes.        Assessment:  Chad Chen is a 44 y.o. male with Right gastroc contracture and AICT.     Plan:   Clinical and radiographic findings were discussed. Operative vs nonoperative treatment options were described. These include but are not limited to bleeding; infection; damage to surrounding nerves or vessels;  persistent pain, stiffness; recurrent deformity; need for additional procedures; amputation; blood clots; pulmonary embolus; cardiac events; stroke; and the general risks of anesthesia including anesthetic death.   We also reviewed postop protocol as well as limitations and expectations. Patient's symptoms have persisted despite conservative management to include but not limited to shoe wear and activity modifications; anti-inflammatories; boot/brace immobilization; and continue to affect the patient's quality of life. Patient understands and desires to proceed with surgical intervention.   Explained risks, benefits, and alternative to the patient. Asked if any questions--none.  Surgery to include but not limited to:   Right Achilles tendon debridement and repair; Maurisio resection; possible gastrocnemius recession; surgery as indicated.    Specifically discussed extensive postop protocol.  Patient tentatively will be 5 weeks nonweightbearing followed by an additional 5-6 weeks in the boot with progressive weight-bearing and heel wedges in place.      On questioning, patient mentioned reaction to oral NSAID use thus we will hold off in the perioperative period.  Also mentioned significant burning sensation with previous use of gauze wrap.  Denies itching specifically.  On further questioning, denies issues with 4 x 4 gauze itself.  I believe he was referencing the cast padding used for either a soft dressing or a splint.  I reiterated no great alternative to this.  We will try to minimize splint wear in the acute postoperative period.        This note was created using voice recognition software and may contain grammatical errors.

## 2024-07-24 ENCOUNTER — LAB VISIT (OUTPATIENT)
Dept: LAB | Facility: HOSPITAL | Age: 44
End: 2024-07-24
Attending: INTERNAL MEDICINE
Payer: COMMERCIAL

## 2024-07-24 DIAGNOSIS — M89.9 BONE DISORDER: ICD-10-CM

## 2024-07-24 DIAGNOSIS — I10 ESSENTIAL HYPERTENSION: ICD-10-CM

## 2024-07-24 DIAGNOSIS — E29.1 HYPOGONADISM IN MALE: ICD-10-CM

## 2024-07-24 LAB
ANION GAP SERPL CALC-SCNC: 10 MMOL/L (ref 8–16)
BASOPHILS # BLD AUTO: 0.04 K/UL (ref 0–0.2)
BASOPHILS NFR BLD: 0.5 % (ref 0–1.9)
BUN SERPL-MCNC: 15 MG/DL (ref 6–20)
CALCIUM SERPL-MCNC: 9.3 MG/DL (ref 8.7–10.5)
CHLORIDE SERPL-SCNC: 105 MMOL/L (ref 95–110)
CO2 SERPL-SCNC: 25 MMOL/L (ref 23–29)
CREAT SERPL-MCNC: 0.9 MG/DL (ref 0.5–1.4)
DIFFERENTIAL METHOD BLD: ABNORMAL
EOSINOPHIL # BLD AUTO: 0.3 K/UL (ref 0–0.5)
EOSINOPHIL NFR BLD: 3.7 % (ref 0–8)
ERYTHROCYTE [DISTWIDTH] IN BLOOD BY AUTOMATED COUNT: 14.8 % (ref 11.5–14.5)
EST. GFR  (NO RACE VARIABLE): >60 ML/MIN/1.73 M^2
GLUCOSE SERPL-MCNC: 88 MG/DL (ref 70–110)
HCT VFR BLD AUTO: 43.6 % (ref 40–54)
HGB BLD-MCNC: 13.9 G/DL (ref 14–18)
IMM GRANULOCYTES # BLD AUTO: 0.01 K/UL (ref 0–0.04)
IMM GRANULOCYTES NFR BLD AUTO: 0.1 % (ref 0–0.5)
LYMPHOCYTES # BLD AUTO: 2.1 K/UL (ref 1–4.8)
LYMPHOCYTES NFR BLD: 28.9 % (ref 18–48)
MCH RBC QN AUTO: 27.4 PG (ref 27–31)
MCHC RBC AUTO-ENTMCNC: 31.9 G/DL (ref 32–36)
MCV RBC AUTO: 86 FL (ref 82–98)
MONOCYTES # BLD AUTO: 0.7 K/UL (ref 0.3–1)
MONOCYTES NFR BLD: 8.9 % (ref 4–15)
NEUTROPHILS # BLD AUTO: 4.3 K/UL (ref 1.8–7.7)
NEUTROPHILS NFR BLD: 57.9 % (ref 38–73)
NRBC BLD-RTO: 0 /100 WBC
PLATELET # BLD AUTO: 266 K/UL (ref 150–450)
PMV BLD AUTO: 10.5 FL (ref 9.2–12.9)
POTASSIUM SERPL-SCNC: 3.6 MMOL/L (ref 3.5–5.1)
RBC # BLD AUTO: 5.07 M/UL (ref 4.6–6.2)
SODIUM SERPL-SCNC: 140 MMOL/L (ref 136–145)
TESTOST SERPL-MCNC: 319 NG/DL (ref 304–1227)
WBC # BLD AUTO: 7.34 K/UL (ref 3.9–12.7)

## 2024-07-24 PROCEDURE — 36415 COLL VENOUS BLD VENIPUNCTURE: CPT | Mod: PN | Performed by: ORTHOPAEDIC SURGERY

## 2024-07-24 PROCEDURE — 80048 BASIC METABOLIC PNL TOTAL CA: CPT | Performed by: INTERNAL MEDICINE

## 2024-07-24 PROCEDURE — 84403 ASSAY OF TOTAL TESTOSTERONE: CPT | Performed by: INTERNAL MEDICINE

## 2024-07-24 PROCEDURE — 85025 COMPLETE CBC W/AUTO DIFF WBC: CPT | Performed by: ORTHOPAEDIC SURGERY

## 2024-07-25 RX ORDER — CEFAZOLIN SODIUM 2 G/50ML
2 SOLUTION INTRAVENOUS
OUTPATIENT
Start: 2024-07-25

## 2024-07-25 RX ORDER — MUPIROCIN 20 MG/G
OINTMENT TOPICAL
OUTPATIENT
Start: 2024-07-25

## 2024-07-31 ENCOUNTER — OFFICE VISIT (OUTPATIENT)
Dept: FAMILY MEDICINE | Facility: CLINIC | Age: 44
End: 2024-07-31
Payer: COMMERCIAL

## 2024-07-31 VITALS
HEIGHT: 71 IN | HEART RATE: 47 BPM | BODY MASS INDEX: 37.22 KG/M2 | RESPIRATION RATE: 18 BRPM | SYSTOLIC BLOOD PRESSURE: 116 MMHG | WEIGHT: 265.88 LBS | DIASTOLIC BLOOD PRESSURE: 72 MMHG

## 2024-07-31 DIAGNOSIS — J98.01 BRONCHOSPASM: Primary | ICD-10-CM

## 2024-07-31 DIAGNOSIS — E87.6 HYPOKALEMIA: ICD-10-CM

## 2024-07-31 DIAGNOSIS — I10 HYPERTENSION, UNSPECIFIED TYPE: ICD-10-CM

## 2024-07-31 DIAGNOSIS — Z88.6 NSAID SENSITIVITY: ICD-10-CM

## 2024-07-31 PROCEDURE — 3008F BODY MASS INDEX DOCD: CPT | Mod: CPTII,S$GLB,, | Performed by: INTERNAL MEDICINE

## 2024-07-31 PROCEDURE — 1159F MED LIST DOCD IN RCRD: CPT | Mod: CPTII,S$GLB,, | Performed by: INTERNAL MEDICINE

## 2024-07-31 PROCEDURE — 1160F RVW MEDS BY RX/DR IN RCRD: CPT | Mod: CPTII,S$GLB,, | Performed by: INTERNAL MEDICINE

## 2024-07-31 PROCEDURE — 99999 PR PBB SHADOW E&M-EST. PATIENT-LVL IV: CPT | Mod: PBBFAC,,, | Performed by: INTERNAL MEDICINE

## 2024-07-31 PROCEDURE — 4010F ACE/ARB THERAPY RXD/TAKEN: CPT | Mod: CPTII,S$GLB,, | Performed by: INTERNAL MEDICINE

## 2024-07-31 PROCEDURE — 3074F SYST BP LT 130 MM HG: CPT | Mod: CPTII,S$GLB,, | Performed by: INTERNAL MEDICINE

## 2024-07-31 PROCEDURE — 3078F DIAST BP <80 MM HG: CPT | Mod: CPTII,S$GLB,, | Performed by: INTERNAL MEDICINE

## 2024-07-31 PROCEDURE — 3044F HG A1C LEVEL LT 7.0%: CPT | Mod: CPTII,S$GLB,, | Performed by: INTERNAL MEDICINE

## 2024-07-31 PROCEDURE — 99214 OFFICE O/P EST MOD 30 MIN: CPT | Mod: S$GLB,,, | Performed by: INTERNAL MEDICINE

## 2024-07-31 RX ORDER — VALSARTAN 320 MG/1
320 TABLET ORAL DAILY
Qty: 90 TABLET | Refills: 3 | Status: SHIPPED | OUTPATIENT
Start: 2024-07-31

## 2024-07-31 RX ORDER — POTASSIUM CHLORIDE 750 MG/1
10 TABLET, EXTENDED RELEASE ORAL 2 TIMES DAILY
Qty: 180 TABLET | Refills: 3 | Status: SHIPPED | OUTPATIENT
Start: 2024-07-31

## 2024-07-31 RX ORDER — AMLODIPINE BESYLATE 5 MG/1
5 TABLET ORAL DAILY
Qty: 90 TABLET | Refills: 3 | Status: SHIPPED | OUTPATIENT
Start: 2024-07-31

## 2024-07-31 NOTE — PROGRESS NOTES
Assessment and Plan:    1. Hypertension, unspecified type  Controlled, continue current medications.  - valsartan (DIOVAN) 320 MG tablet; Take 1 tablet (320 mg total) by mouth once daily.  Dispense: 90 tablet; Refill: 3  - amLODIPine (NORVASC) 5 MG tablet; Take 1 tablet (5 mg total) by mouth once daily.  Dispense: 90 tablet; Refill: 3    2. Hypokalemia  - potassium chloride (KLOR-CON) 10 MEQ TbSR; Take 1 tablet (10 mEq total) by mouth 2 (two) times daily.  Dispense: 180 tablet; Refill: 3    3. Bronchospasm  - Ambulatory referral/consult to Allergy; Future  4. NSAID sensitivity  ?AERD. Recommend AAI evaluation. Hold all systemic NSAIDs for now, but given the severity of the thumb pain can try topical diclofenac with the instruction to stop this if developing any respiratory symptoms.  - Ambulatory referral/consult to Allergy; Future      Visit today included increased complexity associated with the care of the episodic problems listed above, including addressing and managing the longitudinal care of the patient due to the serious and/or complex managed problem(s).    ______________________________________________________________________  Subjective:    Chief Complaint:  Follow up chronic medical conditions.    HPI:  Chad is a 44 y.o. year old male here to follow up chronic medical conditions.     He reports that in the setting of taking ibuprofen for arthritis pain, he started to develop bronchospasm. Notes that albuterol was not totally helpful. Switched to meloxicam after a few days and noticed that he had bronchospasm with this as well. He reports that he has since stopped taking either medication and has stopped having the wheezing and shortness of breath. Denied any rhinosinusitis symptoms with this. Does take zyrtec or xyzal ever day. No tongue or mouth swelling. No hives or rash.     HTN- Controlled with valsartan 320 and amlodipine 5. Has been controlled at home.     Dyslipidemia- LDL improved on last labs,  but HDL still low and TGs still borderline high.     Testosterone- Low on last lab in Feb. Improved on recent labs.     ED- Using sildenafil PRN.     Hypokalemia- Taking 10 mEq BID. Recent lab with K 3.6.     Migraine- Prescribed imitrex. Typically only needs this after getting a vaccines.     Medications:  Current Outpatient Medications on File Prior to Visit   Medication Sig Dispense Refill    amLODIPine (NORVASC) 5 MG tablet TAKE 1 TABLET(5 MG) BY MOUTH EVERY DAY 90 tablet 3    azelastine (ASTELIN) 137 mcg (0.1 %) nasal spray USE 1 SPRAY INTO EACH NOSTRIL TWICE DAILY 90 mL 3    levocetirizine (XYZAL) 5 MG tablet Take 5 mg by mouth every evening.      meloxicam (MOBIC) 15 MG tablet Take 1 tablet (15 mg total) by mouth once daily. 30 tablet 0    potassium chloride (KLOR-CON) 10 MEQ TbSR Take 1 tablet by mouth twice daily. 180 tablet 2    PROAIR RESPICLICK 90 mcg/actuation inhaler Inhale 2 puffs into the lungs every 6 (six) hours as needed for Wheezing or Shortness of Breath. 1 each 1    sildenafiL (VIAGRA) 50 MG tablet Take 1 tablet by mouth daily as needed for erectile dysfunction. 30 tablet 9    sumatriptan (IMITREX) 50 MG tablet Take 1 tablet (50 mg total) by mouth daily as needed for Migraine. 9 tablet 1    valsartan (DIOVAN) 320 MG tablet TAKE 1 TABLET(320 MG) BY MOUTH EVERY DAY 90 tablet 3     No current facility-administered medications on file prior to visit.       Review of Systems:  Review of Systems   Constitutional:  Negative for activity change and unexpected weight change.   HENT:  Negative for hearing loss, rhinorrhea and trouble swallowing.    Eyes:  Negative for discharge and visual disturbance.   Respiratory:  Positive for wheezing. Negative for chest tightness.    Cardiovascular:  Negative for chest pain and palpitations.   Gastrointestinal:  Negative for blood in stool, constipation, diarrhea and vomiting.   Endocrine: Negative for polyuria.   Genitourinary:  Negative for difficulty urinating,  "hematuria and urgency.   Musculoskeletal:  Positive for arthralgias and joint swelling. Negative for neck pain.   Neurological:  Negative for weakness and headaches.   Psychiatric/Behavioral:  Positive for dysphoric mood. Negative for confusion.      Entered by patient and reviewed and updated during visit        Past Medical History:  Past Medical History:   Diagnosis Date    Asthma     exercise induced    Cardiac murmur     Dyslipidemia     Hypertension     Obesity     PONV (postoperative nausea and vomiting)     Seasonal and perennial allergic rhinitis        Objective:    Vitals:  Vitals:    07/31/24 1607   BP: 116/72   Pulse: (!) 47   Resp: 18   Weight: 120.6 kg (265 lb 14 oz)   Height: 5' 11" (1.803 m)   PainSc:   7   PainLoc: Finger       Physical Exam  Vitals reviewed.   Constitutional:       General: He is not in acute distress.     Appearance: He is well-developed.   Eyes:      Conjunctiva/sclera: Conjunctivae normal.   Cardiovascular:      Rate and Rhythm: Regular rhythm. Bradycardia present.   Pulmonary:      Effort: Pulmonary effort is normal. No respiratory distress.   Skin:     General: Skin is warm and dry.   Neurological:      Mental Status: He is alert and oriented to person, place, and time.   Psychiatric:         Mood and Affect: Mood normal.         Behavior: Behavior normal.         Data:  K 3.6      Bronwyn Brandt MD  Internal Medicine    "

## 2024-07-31 NOTE — PATIENT INSTRUCTIONS
AVOID all oral NSAIDs. Over the counter this is ibuprofen, naproxen, and aspirin.     You can take acetaminophen.     It would be OK to try topical diclofenac (brand name is Voltaren gel) over the counter, but if you have any bronchospasm with this, I would stop.

## 2024-08-08 ENCOUNTER — PATIENT MESSAGE (OUTPATIENT)
Dept: FAMILY MEDICINE | Facility: CLINIC | Age: 44
End: 2024-08-08
Payer: COMMERCIAL

## 2024-08-13 ENCOUNTER — OFFICE VISIT (OUTPATIENT)
Dept: ORTHOPEDICS | Facility: CLINIC | Age: 44
End: 2024-08-13
Payer: COMMERCIAL

## 2024-08-13 VITALS — WEIGHT: 265.88 LBS | BODY MASS INDEX: 37.22 KG/M2 | HEIGHT: 71 IN

## 2024-08-13 DIAGNOSIS — M77.8 FLEXOR CARPI RADIALIS TENDINITIS: ICD-10-CM

## 2024-08-13 DIAGNOSIS — G56.01 CARPAL TUNNEL SYNDROME OF RIGHT WRIST: Primary | ICD-10-CM

## 2024-08-13 PROCEDURE — 1159F MED LIST DOCD IN RCRD: CPT | Mod: CPTII,S$GLB,, | Performed by: PHYSICIAN ASSISTANT

## 2024-08-13 PROCEDURE — 99214 OFFICE O/P EST MOD 30 MIN: CPT | Mod: S$GLB,,, | Performed by: PHYSICIAN ASSISTANT

## 2024-08-13 PROCEDURE — 99999 PR PBB SHADOW E&M-EST. PATIENT-LVL III: CPT | Mod: PBBFAC,,, | Performed by: PHYSICIAN ASSISTANT

## 2024-08-13 PROCEDURE — 3044F HG A1C LEVEL LT 7.0%: CPT | Mod: CPTII,S$GLB,, | Performed by: PHYSICIAN ASSISTANT

## 2024-08-13 PROCEDURE — 3008F BODY MASS INDEX DOCD: CPT | Mod: CPTII,S$GLB,, | Performed by: PHYSICIAN ASSISTANT

## 2024-08-13 PROCEDURE — 4010F ACE/ARB THERAPY RXD/TAKEN: CPT | Mod: CPTII,S$GLB,, | Performed by: PHYSICIAN ASSISTANT

## 2024-08-13 RX ORDER — METHYLPREDNISOLONE 4 MG/1
TABLET ORAL
Qty: 21 EACH | Refills: 0 | Status: SHIPPED | OUTPATIENT
Start: 2024-08-13 | End: 2024-09-03

## 2024-08-14 NOTE — PROGRESS NOTES
2024    HPI:  Chad Chen is a 44 y.o. male, who presents to clinic today for evaluation of his right wrist pain and right hand numbness and tingling.  States symptoms been present for some time.  States he does have nocturnal paresthesias that wake him at night.  States he does have some pain with the use of the wrist.  States pain is worse with activity states pain is better with rest.  Denies any acute injuries.  Denies any other complaints at this time.    PMHX:  Past Medical History:   Diagnosis Date    Asthma     exercise induced    Cardiac murmur     Dyslipidemia     Hypertension     Obesity     PONV (postoperative nausea and vomiting)     Seasonal and perennial allergic rhinitis        PSHX:  Past Surgical History:   Procedure Laterality Date    ADENOIDECTOMY  2004    CARPAL TUNNEL RELEASE Left 2020    Procedure: RELEASE, CARPAL TUNNEL;  Surgeon: Dre Montanez MD;  Location: [facility] OR;  Service: Orthopedics;  Laterality: Left;  Procedure:  Left carpal tunnel release    Position:  Supine    Anesthesia:  General equipment:  Carpal tunnel Set    LAPAROSCOPIC CHOLECYSTECTOMY      laparoscopic removal gallstones    TONSILLECTOMY  2004    corrective septoplasty same time.    TRIGGER FINGER RELEASE Right 3/31/2022    Procedure: Right middle finger trigger release;  Surgeon: Dre Montanez MD;  Location: [facility] OR;  Service: Orthopedics;  Laterality: Right;       FMHX:  Family History   Problem Relation Name Age of Onset    Cancer Mother          triple breast cancer at 2 different times.     Breast cancer Mother      Heart disease Father          2V CABG at 45; 12 coronary stents.    Hyperlipidemia Father      Mental illness Father          MIKALA    Stroke Father          traumatic CVA    Vision loss Father          very poor vision    Cancer Maternal Grandfather           from melanoma at his 60's.    Early death Maternal Grandfather           early 60's of melanoma    Melanoma Maternal  "Grandfather      Diabetes Paternal Grandmother      Hypertension Paternal Grandfather      Diabetes Maternal Uncle      Hypertension Paternal Uncle         SOCHX:  Social History     Tobacco Use    Smoking status: Never    Smokeless tobacco: Never   Substance Use Topics    Alcohol use: Not Currently     Comment: no alcohol for 8 months as of 3/31/22       ALLERGIES:  Inderal [propranolol] and Lisinopril    CURRENT MEDICATIONS:  Current Outpatient Medications on File Prior to Visit   Medication Sig Dispense Refill    amLODIPine (NORVASC) 5 MG tablet Take 1 tablet (5 mg total) by mouth once daily. 90 tablet 3    azelastine (ASTELIN) 137 mcg (0.1 %) nasal spray USE 1 SPRAY INTO EACH NOSTRIL TWICE DAILY 90 mL 3    levocetirizine (XYZAL) 5 MG tablet Take 5 mg by mouth every evening.      meloxicam (MOBIC) 15 MG tablet Take 1 tablet (15 mg total) by mouth once daily. 30 tablet 0    potassium chloride (KLOR-CON) 10 MEQ TbSR Take 1 tablet (10 mEq total) by mouth 2 (two) times daily. 180 tablet 3    PROAIR RESPICLICK 90 mcg/actuation inhaler Inhale 2 puffs into the lungs every 6 (six) hours as needed for Wheezing or Shortness of Breath. 1 each 1    sildenafiL (VIAGRA) 50 MG tablet Take 1 tablet by mouth daily as needed for erectile dysfunction. 30 tablet 9    sumatriptan (IMITREX) 50 MG tablet Take 1 tablet (50 mg total) by mouth daily as needed for Migraine. 9 tablet 1    valsartan (DIOVAN) 320 MG tablet Take 1 tablet (320 mg total) by mouth once daily. 90 tablet 3     No current facility-administered medications on file prior to visit.       REVIEW OF SYSTEMS:  Review of Systems Complete; Negative, unless noted above.    GENERAL PHYSICAL EXAM:   Ht 5' 11" (1.803 m)   Wt 120.6 kg (265 lb 14 oz)   BMI 37.08 kg/m²    GEN: well developed, well nourished, no acute distress   PULM: No wheezing, no respiratory distress   CV: RRR    ORTHO EXAM:   Examination of the right hand/wrist reveals no edema, erythema, ecchymosis, or skin " breakdown.  Able make composite fist and fully extend all fingers.  Able to flex in the wrist appropriately.  Tenderness with terminal extension of the right wrist.  Tenderness with palpation of the FCR tendon, particularly at the insertion site at the distal scaphoid.  5/5 /intrinsic strength.  5/5 thenar strength.  5/5 hypothenar strength.  Positive carpal Tinel's test.  Positive Durkan's test.  Negative cubital Tinel's test.  Normal sensation in the radial, ulnar, median nerve distributions.  Capillary refill less than 2 seconds.    RADIOLOGY:   None.    ASSESSMENT:   FCR tendinitis of the right wrist, right carpal tunnel syndrome    PLAN:  1. I discussed with Chad Chen the wrist tendinitis and carpal tunnel syndrome pathology and treatment options in detail during today's visit.  After treatment options were discussed, we decided the best course of action this time is to perform a course of oral steroids via a Medrol Dosepak, as he has an allergy to NSAIDs.  We discussed we would also proceed with splinting via a removable Velcro short wrist splint of the right hand/wrist.  We discussed importance of wearing the splint at all times only to remove for bathing for the next 2 weeks, at which time he may transition to wearing it for activity and sleep only.  We also discussed we would schedule him for an EMG nerve conduction study of the severity of his right carpal tunnel syndrome.  He verbally agreed with the treatment plan     2.  He was scheduled for an EMG nerve conduction study of the right upper extremity in clinic today.      3. He was prescribed a Medrol Dosepak in clinic today.  He was instructed to discontinue medication for any adverse effects.      4. He was placed in a removable Velcro short wrist splint of the right wrist in clinic today     5. I would like him follow-up in clinic after the EMG to discuss the results.  He was instructed to contact clinic for any problems or concerns in the  interim.

## 2024-08-26 ENCOUNTER — TELEPHONE (OUTPATIENT)
Dept: PHYSICAL MEDICINE AND REHAB | Facility: CLINIC | Age: 44
End: 2024-08-26
Payer: COMMERCIAL

## 2024-08-26 NOTE — TELEPHONE ENCOUNTER
----- Message from Shelly Montero sent at 8/23/2024  4:47 PM CDT -----  Contact: pt [phone]  Type: Needs Medical Advice  Who Called:  Pt     Best Call Back Number: [phone]    Additional Information:      Pt asking if he can get a sooner appt date for emg at [facility].[place] [facility] [place]  Pls call back and advise

## 2024-09-12 NOTE — PROGRESS NOTES
"ALLERGY & IMMUNOLOGY CLINIC -  NEW PATIENT     HISTORY OF PRESENT ILLNESS     Patient ID: Chad Chen is a 44 y.o. male    CC:   Chief Complaint   Patient presents with    Allergies       HPI: Chad Chen is a 44 y.o. male presents for evaluation of:    09/17/2024  NSAID Hypersensitivity?: Endorses lifelong episodes of wheezing with associated rhinitis. Previously underwent allergen immunotherapy when he lived in [place] before moving to [place] 6 years ago. States that he previously would experience 2-3 episodes of wheezing per year, but denies hospitalization and systemic steroid usage for asthma. He has never required use of a maintenance inhaler for his asthma. Albuterol had relieved symptoms previously. Denies seasonal exacerbations and known triggers for asthma. States that for the previous 4 months, has felt like his symptoms have progressively worsened and caused continued "wheezing episodes" which have not been relieved with albuterol usage as they had previously. During this time, has noticed an acute worsening within 30 minutes with use of Meloxicam and Ibuprofen which will last upwards of 8 hours. Denies urticaria, rhinitis symptoms, anosmia, conjunctival symptoms during episodes. Denies GI involvement as well.      REVIEW OF SYSTEMS     CONST: no F/C/NS, no unintentional weight changes  Balance of review of systems negative except as mentioned above     MEDICAL HISTORY     MedHx: active problems reviewed  SurgHx:   Past Surgical History:   Procedure Laterality Date    ADENOIDECTOMY  2004    CARPAL TUNNEL RELEASE Left 12/17/2020    Procedure: RELEASE, CARPAL TUNNEL;  Surgeon: Dre Montanez MD;  Location: [facility];  Service: Orthopedics;  Laterality: Left;  Procedure:  Left carpal tunnel release    Position:  Supine    Anesthesia:  General equipment:  Carpal tunnel Set    LAPAROSCOPIC CHOLECYSTECTOMY  2010    laparoscopic removal gallstones    TONSILLECTOMY  2004    corrective septoplasty same time.    " "TRIGGER FINGER RELEASE Right 3/31/2022    Procedure: Right middle finger trigger release;  Surgeon: Dre Montanez MD;  Location: [facility] OR;  Service: Orthopedics;  Laterality: Right;       SocHx:   -Denies smoking history and illicit drug use  -Pets: Cats at home    FamHx:   Mother and daughter with asthma  Otherwise no Family History of asthma, allergic rhinitis, atopic dermatitis, drug allergy, food allergy, venom allergy or immune deficiency.     Allergies: see below  Medications: MAR reviewed       PHYSICAL EXAM     VS: Pulse (!) 56   Ht 5' 11" (1.803 m)   Wt 117.7 kg (259 lb 5.9 oz)   SpO2 97%   BMI 36.17 kg/m²   GENERAL: awake, alert, cooperative with exam  EYES: PERRL, EOMI, no conjunctival injection, no discharge, no infraorbital shiners  EARS: external auditory canals normal B/L, TM normal B/L  NOSE: NT 2+ and pink B/L, no stringing mucous, no polyps  ORAL: MMM, no ulcers, no thrush, no cobblestoning  LUNGS: Inspiratory stridor noted bilaterally  HEART: Normal Rate and regular rhythm, normal S1/S2, no m/g/r  EXTREMITIES: +2 distal pulses, no c/c/e  DERM: no rashes, no skin breaks     PULMONARY FUNCTION     PFTs: 9/2023 normal spirometry     ASSESSMENT/PLAN     Chad Chen is a 44 y.o. male with       1. History of asthma    2. NSAID sensitivity    3. Chronic rhinitis      Personal history of allergic rhinitis and asthma previously undergoing allergen immunotherapy with improvement. Now with four months of abnormal inspiratory breathing (Almost stridor-sounding?) and not relieved with GIAN therapy. Underwent spirometry September 2023 which was normal but now with worsening symptoms. Additional considerations include an upper airway anomaly which I would recommend ENT evaluation for possible laryngoscope. Will re-assess Spirometry values to compare to prior studies as well as ordering Region 6 Allergen panel given previous sensitization. Referred to high risk clinic as well for Aspirin challenge. No " visibile nasal polyps or upper respiratory symptoms during episodes nor urticaria which makes me suspicious of a Type I or IV Pseudo-allergic reaction.     Follow up: 3 months-Will schedule once Spirometry is complete      Rd Nichols MD    I spent a total of 45 minutes on the day of the visit. This includes face to face time and non-face to face time preparing to see the patient (eg, review of tests), obtaining and/or reviewing separately obtained history, documenting clinical information in the electronic or other health record, independently interpreting results and communicating results to the patient/family/caregiver, or care coordinator.

## 2024-09-17 ENCOUNTER — OFFICE VISIT (OUTPATIENT)
Dept: ALLERGY | Facility: CLINIC | Age: 44
End: 2024-09-17
Payer: COMMERCIAL

## 2024-09-17 ENCOUNTER — LAB VISIT (OUTPATIENT)
Dept: LAB | Facility: HOSPITAL | Age: 44
End: 2024-09-17
Attending: STUDENT IN AN ORGANIZED HEALTH CARE EDUCATION/TRAINING PROGRAM
Payer: COMMERCIAL

## 2024-09-17 VITALS — BODY MASS INDEX: 36.31 KG/M2 | HEART RATE: 56 BPM | OXYGEN SATURATION: 97 % | HEIGHT: 71 IN | WEIGHT: 259.38 LBS

## 2024-09-17 DIAGNOSIS — Z88.6 NSAID SENSITIVITY: ICD-10-CM

## 2024-09-17 DIAGNOSIS — J98.01 BRONCHOSPASM: ICD-10-CM

## 2024-09-17 DIAGNOSIS — J31.0 CHRONIC RHINITIS: ICD-10-CM

## 2024-09-17 DIAGNOSIS — Z87.09 HISTORY OF ASTHMA: Primary | ICD-10-CM

## 2024-09-17 LAB
BASOPHILS # BLD AUTO: 0.03 K/UL (ref 0–0.2)
BASOPHILS NFR BLD: 0.4 % (ref 0–1.9)
DIFFERENTIAL METHOD BLD: ABNORMAL
EOSINOPHIL # BLD AUTO: 0.2 K/UL (ref 0–0.5)
EOSINOPHIL NFR BLD: 2.1 % (ref 0–8)
ERYTHROCYTE [DISTWIDTH] IN BLOOD BY AUTOMATED COUNT: 14.6 % (ref 11.5–14.5)
HCT VFR BLD AUTO: 44.6 % (ref 40–54)
HGB BLD-MCNC: 13.7 G/DL (ref 14–18)
IGE SERPL-ACNC: 135 IU/ML (ref 0–100)
IMM GRANULOCYTES # BLD AUTO: 0.02 K/UL (ref 0–0.04)
IMM GRANULOCYTES NFR BLD AUTO: 0.3 % (ref 0–0.5)
LYMPHOCYTES # BLD AUTO: 2.6 K/UL (ref 1–4.8)
LYMPHOCYTES NFR BLD: 33.9 % (ref 18–48)
MCH RBC QN AUTO: 26.9 PG (ref 27–31)
MCHC RBC AUTO-ENTMCNC: 30.7 G/DL (ref 32–36)
MCV RBC AUTO: 88 FL (ref 82–98)
MONOCYTES # BLD AUTO: 0.8 K/UL (ref 0.3–1)
MONOCYTES NFR BLD: 10.4 % (ref 4–15)
NEUTROPHILS # BLD AUTO: 4 K/UL (ref 1.8–7.7)
NEUTROPHILS NFR BLD: 52.9 % (ref 38–73)
NRBC BLD-RTO: 0 /100 WBC
PLATELET # BLD AUTO: 305 K/UL (ref 150–450)
PMV BLD AUTO: 10.3 FL (ref 9.2–12.9)
RBC # BLD AUTO: 5.1 M/UL (ref 4.6–6.2)
WBC # BLD AUTO: 7.57 K/UL (ref 3.9–12.7)

## 2024-09-17 PROCEDURE — 4010F ACE/ARB THERAPY RXD/TAKEN: CPT | Mod: CPTII,S$GLB,, | Performed by: STUDENT IN AN ORGANIZED HEALTH CARE EDUCATION/TRAINING PROGRAM

## 2024-09-17 PROCEDURE — 3044F HG A1C LEVEL LT 7.0%: CPT | Mod: CPTII,S$GLB,, | Performed by: STUDENT IN AN ORGANIZED HEALTH CARE EDUCATION/TRAINING PROGRAM

## 2024-09-17 PROCEDURE — 86003 ALLG SPEC IGE CRUDE XTRC EA: CPT | Mod: 59 | Performed by: STUDENT IN AN ORGANIZED HEALTH CARE EDUCATION/TRAINING PROGRAM

## 2024-09-17 PROCEDURE — 99204 OFFICE O/P NEW MOD 45 MIN: CPT | Mod: S$GLB,,, | Performed by: STUDENT IN AN ORGANIZED HEALTH CARE EDUCATION/TRAINING PROGRAM

## 2024-09-17 PROCEDURE — 1159F MED LIST DOCD IN RCRD: CPT | Mod: CPTII,S$GLB,, | Performed by: STUDENT IN AN ORGANIZED HEALTH CARE EDUCATION/TRAINING PROGRAM

## 2024-09-17 PROCEDURE — 86003 ALLG SPEC IGE CRUDE XTRC EA: CPT | Performed by: STUDENT IN AN ORGANIZED HEALTH CARE EDUCATION/TRAINING PROGRAM

## 2024-09-17 PROCEDURE — 99999 PR PBB SHADOW E&M-EST. PATIENT-LVL IV: CPT | Mod: PBBFAC,,, | Performed by: STUDENT IN AN ORGANIZED HEALTH CARE EDUCATION/TRAINING PROGRAM

## 2024-09-17 PROCEDURE — 82785 ASSAY OF IGE: CPT | Performed by: STUDENT IN AN ORGANIZED HEALTH CARE EDUCATION/TRAINING PROGRAM

## 2024-09-17 PROCEDURE — 85025 COMPLETE CBC W/AUTO DIFF WBC: CPT | Performed by: STUDENT IN AN ORGANIZED HEALTH CARE EDUCATION/TRAINING PROGRAM

## 2024-09-17 PROCEDURE — 3008F BODY MASS INDEX DOCD: CPT | Mod: CPTII,S$GLB,, | Performed by: STUDENT IN AN ORGANIZED HEALTH CARE EDUCATION/TRAINING PROGRAM

## 2024-09-17 NOTE — Clinical Note
Daryn Hines,  When you get the chance, can you schedule this patient for an aspirin challenge in [facility] please? Thank you! Rd

## 2024-09-19 ENCOUNTER — PATIENT MESSAGE (OUTPATIENT)
Dept: ALLERGY | Facility: CLINIC | Age: 44
End: 2024-09-19
Payer: COMMERCIAL

## 2024-09-20 ENCOUNTER — OFFICE VISIT (OUTPATIENT)
Dept: PHYSICAL MEDICINE AND REHAB | Facility: CLINIC | Age: 44
End: 2024-09-20
Payer: COMMERCIAL

## 2024-09-20 DIAGNOSIS — M77.8 FLEXOR CARPI RADIALIS TENDINITIS: ICD-10-CM

## 2024-09-20 DIAGNOSIS — R20.2 NUMBNESS AND TINGLING IN RIGHT HAND: ICD-10-CM

## 2024-09-20 DIAGNOSIS — R20.0 NUMBNESS AND TINGLING IN RIGHT HAND: ICD-10-CM

## 2024-09-20 LAB
A ALTERNATA IGE QN: 1.74 KU/L
A FUMIGATUS IGE QN: <0.1 KU/L
BERMUDA GRASS IGE QN: 0.28 KU/L
CAT DANDER IGE QN: <0.1 KU/L
CEDAR IGE QN: <0.1 KU/L
D FARINAE IGE QN: <0.1 KU/L
D PTERONYSS IGE QN: <0.1 KU/L
DEPRECATED CEDAR IGE RAST QL: NORMAL
DEPRECATED TIMOTHY IGE RAST QL: ABNORMAL
DOG DANDER IGE QN: <0.1 KU/L
ELDER IGE QN: 0.16 KU/L
ENGL PLANTAIN IGE QN: 0.11 KU/L
PECAN/HICK TREE IGE QN: 1.9 KU/L
RAST CLASS: ABNORMAL
RAST CLASS: NORMAL
TIMOTHY IGE QN: 0.54 KU/L
WEST RAGWEED IGE QN: 0.47 KU/L
WHITE OAK IGE QN: 0.68 KU/L

## 2024-09-20 PROCEDURE — 99999 PR PBB SHADOW E&M-EST. PATIENT-LVL II: CPT | Mod: PBBFAC,,, | Performed by: PHYSICAL MEDICINE & REHABILITATION

## 2024-09-20 NOTE — PROGRESS NOTES
[facility]  [address] [facility] [address] [place] [address]             Full Name: Chad Chen Gender: Male  MRN: [id number] YOB: 1980  History: Patient complaints of numbness, tingling and pain of right hand. Neck pain that radiates down right shoulder.        Visit Date: 9/20/2024 1:08 PM  Age: 44 Years  Examining Physician: Fe Harp DO   Referring Physician: Keo Powers PA-C   Height: 6 feet 0 inch      Sensory NCS      Nerve / Sites Rec. Site Onset Lat Peak Lat NP Amp PP Amp Segments Distance Velocity     ms ms µV µV  cm m/s   R Median - Digit III (Antidromic)      Wrist Dig III 2.75 3.54 22.8 33.1 Wrist - Dig III 14 51   R Ulnar - Digit V (Antidromic)      Wrist Dig V 2.38 3.10 22.5 30.5 Wrist - Dig V 14 59   R Radial - Anatomical snuff box (Forearm)      Forearm Wrist 1.48 2.06 15.2 21.3 Forearm - Wrist 10 68       Motor NCS      Nerve / Sites Muscle Latency Amplitude Amp % Duration Segments Distance Lat Diff Velocity     ms mV % ms  cm ms m/s   R Median - APB      Wrist APB 3.90 5.2 100 7.27 Wrist - APB 8        Elbow APB 8.31 4.6 88.7 7.25 Elbow - Wrist 23 4.42 52   R Ulnar - ADM      Wrist ADM 3.23 10.9 100 6.42 Wrist - ADM 8        B.Elbow ADM 7.23 9.7 88.9 6.63 B.Elbow - Wrist 21 4.00 52      A.Elbow ADM 9.15 9.6 87.9 6.48 A.Elbow - B.Elbow 10 1.92 52       EMG Summary Table     Spontaneous MUAP Recruitment   Muscle IA Fib PSW Fasc CRD Amp Dur. Poly Pattern   R. Deltoid N None None None None N N None N   R. Biceps brachii N None None None None N N None N   R. Triceps brachii N None None None None N N None N   R. Pronator teres N None None None None N N None N   R. Abductor pollicis brevis N None None None None N N None N       Summary    The motor conduction test was normal in all 2 of the tested nerves: R Median - APB, R Ulnar - ADM.    The sensory conduction test was normal in all 3 of the tested nerves: R Median - Digit III (Antidromic), R Ulnar - Digit V (Antidromic), R  Radial - Anatomical snuff box (Forearm).    The needle EMG study was normal in all 5 tested muscles: R. Deltoid, R. Biceps brachii, R. Triceps brachii, R. Pronator teres, R. Abductor pollicis brevis.       Impression:  Normal study.  No electrophysiologic evidence of right cervical radiculopathy/plexopathy, right median mononeuropathy, right ulnar mononeuropathy, or peripheral neuropathy in the right upper extremity.    ------------------------------  Fe Harp, DO

## 2024-09-23 ENCOUNTER — OFFICE VISIT (OUTPATIENT)
Dept: ORTHOPEDICS | Facility: CLINIC | Age: 44
End: 2024-09-23
Payer: COMMERCIAL

## 2024-09-23 ENCOUNTER — PATIENT MESSAGE (OUTPATIENT)
Dept: ORTHOPEDICS | Facility: CLINIC | Age: 44
End: 2024-09-23

## 2024-09-23 VITALS — WEIGHT: 259.5 LBS | BODY MASS INDEX: 36.33 KG/M2 | HEIGHT: 71 IN

## 2024-09-23 DIAGNOSIS — M77.8 FLEXOR CARPI RADIALIS TENDINITIS: ICD-10-CM

## 2024-09-23 DIAGNOSIS — G56.01 CARPAL TUNNEL SYNDROME OF RIGHT WRIST: Primary | ICD-10-CM

## 2024-09-23 PROCEDURE — 1159F MED LIST DOCD IN RCRD: CPT | Mod: CPTII,S$GLB,, | Performed by: PHYSICIAN ASSISTANT

## 2024-09-23 PROCEDURE — 1160F RVW MEDS BY RX/DR IN RCRD: CPT | Mod: CPTII,S$GLB,, | Performed by: PHYSICIAN ASSISTANT

## 2024-09-23 PROCEDURE — 4010F ACE/ARB THERAPY RXD/TAKEN: CPT | Mod: CPTII,S$GLB,, | Performed by: PHYSICIAN ASSISTANT

## 2024-09-23 PROCEDURE — 20526 THER INJECTION CARP TUNNEL: CPT | Mod: RT,S$GLB,, | Performed by: PHYSICIAN ASSISTANT

## 2024-09-23 PROCEDURE — 3044F HG A1C LEVEL LT 7.0%: CPT | Mod: CPTII,S$GLB,, | Performed by: PHYSICIAN ASSISTANT

## 2024-09-23 PROCEDURE — 99999 PR PBB SHADOW E&M-EST. PATIENT-LVL III: CPT | Mod: PBBFAC,,, | Performed by: PHYSICIAN ASSISTANT

## 2024-09-23 PROCEDURE — 99213 OFFICE O/P EST LOW 20 MIN: CPT | Mod: 25,S$GLB,, | Performed by: PHYSICIAN ASSISTANT

## 2024-09-23 PROCEDURE — 3008F BODY MASS INDEX DOCD: CPT | Mod: CPTII,S$GLB,, | Performed by: PHYSICIAN ASSISTANT

## 2024-09-23 RX ORDER — TRIAMCINOLONE ACETONIDE 40 MG/ML
40 INJECTION, SUSPENSION INTRA-ARTICULAR; INTRAMUSCULAR
Status: DISCONTINUED | OUTPATIENT
Start: 2024-09-23 | End: 2024-09-23 | Stop reason: HOSPADM

## 2024-09-23 RX ADMIN — TRIAMCINOLONE ACETONIDE 40 MG: 40 INJECTION, SUSPENSION INTRA-ARTICULAR; INTRAMUSCULAR at 10:09

## 2024-09-23 NOTE — PROCEDURES
Carpal Tunnel    Date/Time: 9/23/2024 10:40 AM    Performed by: Keo Powers PA-C  Authorized by: Keo Powers PA-C    Consent Done?:  Yes (Verbal)  Indications:  Pain  Site marked: the procedure site was marked    Timeout: prior to procedure the correct patient, procedure, and site was verified    Prep: patient was prepped and draped in usual sterile fashion      Local anesthesia used?: Yes    Local anesthetic:  Lidocaine 1% without epinephrine  Anesthetic total (ml):  0.5    Location:  Wrist  Site:  R carpal tunnel  Ultrasonic Guidance for Needle Placement?: No    Needle size:  25 G  Approach:  Volar  Medications:  40 mg triamcinolone acetonide 40 mg/mL (20 mg injected)  Patient tolerance:  Patient tolerated the procedure well with no immediate complications

## 2024-09-23 NOTE — PROGRESS NOTES
2024    HPI:  Chad Chen is a 44 y.o. male, who presents to clinic today for continued evaluation of his right carpal tunnel syndrome and right wrist FCR tendinitis, and to discuss his EMG results.  States no significant change in his carpal tunnel symptoms.  States he continues to have numbness in the median nerve distribution of the right hand.  States does have some pain of the wrist, but it has improved since his last visit.  Denies any acute injuries since his last visit.  Denies any other complaints at this time.    PMHX:  Past Medical History:   Diagnosis Date    Asthma     exercise induced    Cardiac murmur     Dyslipidemia     Hypertension     Obesity     PONV (postoperative nausea and vomiting)     Seasonal and perennial allergic rhinitis        PSHX:  Past Surgical History:   Procedure Laterality Date    ADENOIDECTOMY  2004    CARPAL TUNNEL RELEASE Left 2020    Procedure: RELEASE, CARPAL TUNNEL;  Surgeon: Dre Montanez MD;  Location: [facility] OR;  Service: Orthopedics;  Laterality: Left;  Procedure:  Left carpal tunnel release    Position:  Supine    Anesthesia:  General equipment:  Carpal tunnel Set    LAPAROSCOPIC CHOLECYSTECTOMY      laparoscopic removal gallstones    TONSILLECTOMY  2004    corrective septoplasty same time.    TRIGGER FINGER RELEASE Right 3/31/2022    Procedure: Right middle finger trigger release;  Surgeon: Dre Montanez MD;  Location: [facility] OR;  Service: Orthopedics;  Laterality: Right;       FMHX:  Family History   Problem Relation Name Age of Onset    Cancer Mother          triple breast cancer at 2 different times.     Breast cancer Mother      Heart disease Father          2V CABG at 45; 12 coronary stents.    Hyperlipidemia Father      Mental illness Father          MIKALA    Stroke Father          traumatic CVA    Vision loss Father          very poor vision    Cancer Maternal Grandfather           from melanoma at his 60's.    Early death Maternal  "Grandfather           early 60's of melanoma    Melanoma Maternal Grandfather      Diabetes Paternal Grandmother      Hypertension Paternal Grandfather      Diabetes Maternal Uncle      Hypertension Paternal Uncle         SOCHX:  Social History     Tobacco Use    Smoking status: Never    Smokeless tobacco: Never   Substance Use Topics    Alcohol use: Not Currently     Comment: no alcohol for 8 months as of 3/31/22       ALLERGIES:  Inderal [propranolol] and Lisinopril    CURRENT MEDICATIONS:  Current Outpatient Medications on File Prior to Visit   Medication Sig Dispense Refill    amLODIPine (NORVASC) 5 MG tablet Take 1 tablet (5 mg total) by mouth once daily. 90 tablet 3    azelastine (ASTELIN) 137 mcg (0.1 %) nasal spray USE 1 SPRAY INTO EACH NOSTRIL TWICE DAILY 90 mL 3    levocetirizine (XYZAL) 5 MG tablet Take 5 mg by mouth every evening.      meloxicam (MOBIC) 15 MG tablet Take 1 tablet (15 mg total) by mouth once daily. 30 tablet 0    potassium chloride (KLOR-CON) 10 MEQ TbSR Take 1 tablet (10 mEq total) by mouth 2 (two) times daily. 180 tablet 3    PROAIR RESPICLICK 90 mcg/actuation inhaler Inhale 2 puffs into the lungs every 6 (six) hours as needed for Wheezing or Shortness of Breath. 1 each 1    sildenafiL (VIAGRA) 50 MG tablet Take 1 tablet by mouth daily as needed for erectile dysfunction. 30 tablet 9    valsartan (DIOVAN) 320 MG tablet Take 1 tablet (320 mg total) by mouth once daily. 90 tablet 3    sumatriptan (IMITREX) 50 MG tablet Take 1 tablet (50 mg total) by mouth daily as needed for Migraine. 9 tablet 1     No current facility-administered medications on file prior to visit.       REVIEW OF SYSTEMS:  Review of Systems Complete; Negative, unless noted above.    GENERAL PHYSICAL EXAM:   Ht 5' 11" (1.803 m)   Wt 117.7 kg (259 lb 7.7 oz)   BMI 36.19 kg/m²    GEN: well developed, well nourished, no acute distress   PULM: No wheezing, no respiratory distress   CV: RRR    ORTHO EXAM:   Examination " of the right hand/wrist reveals no edema, erythema, ecchymosis, or skin breakdown.  Able make composite fist and fully extend all fingers.  Full intact range of motion of the right wrist.  Mild tenderness to palpation of the FCR tendon.  Positive carpal Tinel's test.  Positive Durkan's test.  Reduced sensation in the median nerve distribution.  Normal sensation in the radial and ulnar nerve distributions.  Capillary refill less than 2 seconds.    RADIOLOGY:   None.    EMG:    EMG nerve conduction study of the right upper extremity showed a normal electrodiagnostic study.    ASSESSMENT:   Right carpal tunnel syndrome, mild FCR tendinitis of the right wrist    PLAN:  1. I discussed with Chad Chen that considering the EMG showed evidence of carpal tunnel syndrome, that the best course of action this time is to perform a diagnostic/therapeutic steroid injection into the right carpal tunnel in clinic today.  We discussed in the meantime we would transition to wearing his removable Velcro short wrist splint at night and for activity only.  He verbally agreed with the treatment plan.      2. Informed consent was obtained.  After an alcohol prep followed by a chlorhexidine prep, a steroid injection was placed into the right carpal tunnel.  He tolerated the procedure well with no immediate complications.    3.  I would like him follow-up in clinic in 3 weeks to determine efficacy of the injection.  He was instructed to contact clinic for any problems or concerns in the interim.

## 2024-09-30 ENCOUNTER — PATIENT MESSAGE (OUTPATIENT)
Dept: ALLERGY | Facility: CLINIC | Age: 44
End: 2024-09-30
Payer: COMMERCIAL

## 2024-09-30 DIAGNOSIS — R06.1 STRIDOR: Primary | ICD-10-CM

## 2024-10-01 RX ORDER — BUDESONIDE AND FORMOTEROL FUMARATE DIHYDRATE 160; 4.5 UG/1; UG/1
2 AEROSOL RESPIRATORY (INHALATION) EVERY 12 HOURS
Qty: 10.2 G | Refills: 3 | Status: SHIPPED | OUTPATIENT
Start: 2024-10-01 | End: 2025-10-01

## 2024-10-02 ENCOUNTER — TELEPHONE (OUTPATIENT)
Dept: ALLERGY | Facility: CLINIC | Age: 44
End: 2024-10-02
Payer: COMMERCIAL

## 2024-10-02 NOTE — TELEPHONE ENCOUNTER
----- Message from Rd Nichols MD sent at 9/17/2024  2:38 PM CDT -----  Daryn Hines,   When you get the chance, can you schedule this patient for an aspirin challenge in [facility] please? Thank you!  Rd

## 2024-10-05 PROBLEM — Z87.09 HISTORY OF ASTHMA: Status: ACTIVE | Noted: 2024-10-05

## 2024-10-14 ENCOUNTER — OFFICE VISIT (OUTPATIENT)
Dept: ORTHOPEDICS | Facility: CLINIC | Age: 44
End: 2024-10-14
Payer: COMMERCIAL

## 2024-10-14 ENCOUNTER — OFFICE VISIT (OUTPATIENT)
Dept: OTOLARYNGOLOGY | Facility: CLINIC | Age: 44
End: 2024-10-14
Payer: COMMERCIAL

## 2024-10-14 VITALS — WEIGHT: 256.38 LBS | BODY MASS INDEX: 35.76 KG/M2

## 2024-10-14 DIAGNOSIS — R06.1 STRIDOR: Primary | ICD-10-CM

## 2024-10-14 DIAGNOSIS — J38.3 VOCAL CORD DYSFUNCTION: ICD-10-CM

## 2024-10-14 DIAGNOSIS — G56.01 CARPAL TUNNEL SYNDROME OF RIGHT WRIST: Primary | ICD-10-CM

## 2024-10-14 PROCEDURE — 3008F BODY MASS INDEX DOCD: CPT | Mod: CPTII,S$GLB,, | Performed by: OTOLARYNGOLOGY

## 2024-10-14 PROCEDURE — 99999 PR PBB SHADOW E&M-EST. PATIENT-LVL III: CPT | Mod: PBBFAC,,, | Performed by: PHYSICIAN ASSISTANT

## 2024-10-14 PROCEDURE — 99203 OFFICE O/P NEW LOW 30 MIN: CPT | Mod: 25,S$GLB,, | Performed by: OTOLARYNGOLOGY

## 2024-10-14 PROCEDURE — 31575 DIAGNOSTIC LARYNGOSCOPY: CPT | Mod: S$GLB,,, | Performed by: OTOLARYNGOLOGY

## 2024-10-14 PROCEDURE — 1160F RVW MEDS BY RX/DR IN RCRD: CPT | Mod: CPTII,S$GLB,, | Performed by: PHYSICIAN ASSISTANT

## 2024-10-14 PROCEDURE — 3044F HG A1C LEVEL LT 7.0%: CPT | Mod: CPTII,S$GLB,, | Performed by: OTOLARYNGOLOGY

## 2024-10-14 PROCEDURE — 4010F ACE/ARB THERAPY RXD/TAKEN: CPT | Mod: CPTII,S$GLB,, | Performed by: OTOLARYNGOLOGY

## 2024-10-14 PROCEDURE — 1159F MED LIST DOCD IN RCRD: CPT | Mod: CPTII,S$GLB,, | Performed by: PHYSICIAN ASSISTANT

## 2024-10-14 PROCEDURE — 3044F HG A1C LEVEL LT 7.0%: CPT | Mod: CPTII,S$GLB,, | Performed by: PHYSICIAN ASSISTANT

## 2024-10-14 PROCEDURE — 4010F ACE/ARB THERAPY RXD/TAKEN: CPT | Mod: CPTII,S$GLB,, | Performed by: PHYSICIAN ASSISTANT

## 2024-10-14 PROCEDURE — 99999 PR PBB SHADOW E&M-EST. PATIENT-LVL III: CPT | Mod: PBBFAC,,, | Performed by: OTOLARYNGOLOGY

## 2024-10-14 PROCEDURE — 99213 OFFICE O/P EST LOW 20 MIN: CPT | Mod: S$GLB,,, | Performed by: PHYSICIAN ASSISTANT

## 2024-10-14 NOTE — PROGRESS NOTES
Subjective:       Patient ID: Chad Chen is a 44 y.o. male.    Chief Complaint: Wheezing (/)    Chad is here for dyspnea and stridor  Length of symptoms: 13 months.  Had a URI last fall and noticed that it seemed to worsen following this.  Had PFTs showing RAD (mild.)  Went away for a little while then maybe over the past 4-6 months it worsened again.   Symptoms are daily. Occurring throughout the day at random times.   Seems to recall NSAIDs may have worsened this.     Has started Symbicort which may be helping a little.   Has a lot of rhinorrhea with exertion / exercise.Nasal congestion with exercise.  Has rhinitis issues: cannot tolerate Astelin 2/2 taste. Flonase prn      Patient validated questionnaires (if applicable):      %            No data to display                   No data to display                   No data to display                     Social History     Tobacco Use   Smoking Status Never   Smokeless Tobacco Never     Social History     Substance and Sexual Activity   Alcohol Use Not Currently    Comment: no alcohol for 8 months as of 3/31/22          Objective:        Constitutional:   He is oriented to person, place, and time. He appears well-developed and well-nourished. He appears alert. He is active. Normal speech.      Head:  Normocephalic and atraumatic. Head is without TMJ tenderness. No scars. Salivary glands normal.  Facial strength is normal.      Ears:    Right Ear: No drainage or swelling. No middle ear effusion.   Left Ear: No drainage or swelling.  No middle ear effusion.     Nose:  Mucosal edema, rhinorrhea (clear) and septal deviation present. No sinus tenderness. Turbinate hypertrophy.      Mouth/Throat  Oropharynx clear and moist without lesions or asymmetry, normal uvula midline and mirror exam normal. Normal dentition. No uvula swelling, lacerations or trismus. No oropharyngeal exudate. Tonsils not present, tonsillar erythema, tonsillar exudate.      Neck:  Full range of motion  with neck supple and no adenopathy. Thyroid tenderness is present. No tracheal deviation, no edema, no erythema, normal range of motion, no stridor, no crepitus and no neck rigidity present. No thyroid mass present.     Cardiovascular:    Intact distal pulses and normal pulses.              Pulmonary/Chest:   Effort normal and breath sounds normal. No stridor.     Psychiatric:   His speech is normal and behavior is normal. His mood appears not anxious. His affect is not labile.     Neurological:   He is alert and oriented to person, place, and time. No sensory deficit.     Skin:   No abrasions, lacerations, lesions, or rashes. No abrasion and no bruising noted.         Tests / Results:  PFTs 10/2024:  Normal    9/2023:  [facility]  Complete PFT report     Quality: All data are acceptable and reproducible. ATS standards met.     Spirometry: FEV1 is normal. FVC is normal. There is no obstruction by FEV1/FVC ratio. Loop contour is unremarkable.      Lung volumes: There is no restriction by TLC.      Diffusing capacity: There is no impairment in gas exchange.      Impression:               - No obstruction              - No restriction              - No impairment in gas exchange              - Within normal limits.    Pre-procedure diagnosis: The primary encounter diagnosis was Stridor. A diagnosis of Vocal cord dysfunction was also pertinent to this visit.     Post-procedure diagnosis: same    Procedure: Flexible fiberoptic laryngoscopy    Surgeon: Carrillo Posada MD    Anesthesia: 4% Lidocaine with 0.25% Phenylephrine topical    Risks, benefits, and alternatives of the procedure were discussed with the patient, and the patient consented to the fiberoptic examination.  We applied a topical nasal decongestant and analgesic.  After adequate anesthesia was obtained, the flexible fiberoptic scope was passed through the nasal cavity. The entire pharynx (nasopharynx to hypopharynx) and the larynx were  visualized. At the end of the examination, the scope was removed. The patient tolerated the procedure well with no complications.     Findings:  -     Laryngeal mucosa is normal  -     Post-cricoid region: normal  -     Lingual tonsils have Grade 1 hypertrophy  -     Adenoids have NO  hypertrophy  -     Right vocal fold: normal mobility     mass/lesion: none  -     Left vocal fold: normal mobility     mass/lesion: none  -     Other findings: constriction of both vocal folds about 50% with inspiration. Improves with nasal breathing. There is subtle irregularity of the subglottic trachea - anatomic vs. Mucous buildup    Assessment:       1. Stridor    2. Vocal cord dysfunction          Plan:         We discussed paradoxical vocal fold motion disorder, vocal cord dysfunction   Basic instructions were given on nasal breathing techniques   Consider speech therapy if no improvement     I do recommend a CT neck without contrast, thin slices through the larynx given a subtle possible irregularity in the subglottic trachea verses mucus buildup.

## 2024-10-14 NOTE — PATIENT INSTRUCTIONS
Vocal Cord Dysfunction (Paradoxical Vocal Fold Motion Disorder)     Paradoxical vocal fold motion (PVFM), also known as vocal cord dysfunction (VCD), is a problem with breathing caused when the vocal folds (vocal cords) squeeze together during inspiration, when they should be open and relaxed.   The exact cause of PVFM is not known, but attacks often occur during exercise. They can also be brought on by gastroesophageal reflux, postnasal drip, irritants/odors/scents in the air, stress, anxiety and panic, and tightening the muscles in the back of the mouth or neck to get greater strength or when concentrating or distracted during exercise. PVFM can also occur with asthma. It is often misdiagnosed as asthma (and vice versa), but these two problems can occur together.     Symptoms of PVFM:   Tightness in the throat or upper chest   Stridor or noisy breathing (most often when breathing in)   Shortness of breath   Feeling like you cannot get air in   Wheezing   Difficulty swallowing   Hoarseness     How is it treated?    People with PVFM can be taught strategies and techniques to gain better control of breathing and the opening and closing of the vocal cords. There are special exercises and techniques that help control PVFM, increase breathing awareness, and help relax the tense muscles. Breathing retraining and therapy with a speech-language pathologist is a very important part of the treatment for PVFM. Practicing these techniques when symptom-free can lead to effective use of them during an episode. The exercises are aimed at overcoming abnormal vocal cord movements, controlling the vocal folds with the breath stream, and improving airflow into the lungs.     The main idea behind therapy is retraining the vocal cord to open during normal breathing. This can be accomplished primarily through breathing techniques. Although a variety of breathing techniques can be used, you can try a few below:    Slow deep breathe  "IN through the nose, and breath out through pursed or puckered lips. Do this multiple times.  3 quick "sniffs" in through the nose, and 3 quick breaths out through pursed or puckered lips.      In addition, short-term, supportive counseling by a psychologist, , counselor, or other professional might be helpful for identifying factors that could possibly be changed or managed in a different way and lead to better control of PVFM. Working with a counselor can ease with adjustment to a new diagnosis and the treatment program. It can also help identify and deal positively with stress that may be an underlying factor in PVFM.    "

## 2024-10-14 NOTE — PROGRESS NOTES
10/14/2024    HPI:  Chad Chen is a 44 y.o. male, who presents to clinic today for continued evaluation of his right carpal tunnel syndrome.  States the injection received at her last visit provided no significant relief.  States he continues to have numbness and tingling in the 1st 3 fingers.  States the injection did resolve his wrist tendinitis.  Denies acute injuries since last visit.  Denies any other complaints at this time.    PMHX:  Past Medical History:   Diagnosis Date    Asthma     exercise induced    Cardiac murmur     Dyslipidemia     Hypertension     Obesity     PONV (postoperative nausea and vomiting)     Seasonal and perennial allergic rhinitis        PSHX:  Past Surgical History:   Procedure Laterality Date    ADENOIDECTOMY  2004    CARPAL TUNNEL RELEASE Left 2020    Procedure: RELEASE, CARPAL TUNNEL;  Surgeon: Dre Montanez MD;  Location: [facility] OR;  Service: Orthopedics;  Laterality: Left;  Procedure:  Left carpal tunnel release    Position:  Supine    Anesthesia:  General equipment:  Carpal tunnel Set    LAPAROSCOPIC CHOLECYSTECTOMY      laparoscopic removal gallstones    TONSILLECTOMY  2004    corrective septoplasty same time.    TRIGGER FINGER RELEASE Right 3/31/2022    Procedure: Right middle finger trigger release;  Surgeon: Dre Montanez MD;  Location: [facility] OR;  Service: Orthopedics;  Laterality: Right;       FMHX:  Family History   Problem Relation Name Age of Onset    Cancer Mother          triple breast cancer at 2 different times.     Breast cancer Mother      Heart disease Father          2V CABG at 45; 12 coronary stents.    Hyperlipidemia Father      Mental illness Father          MIKALA    Stroke Father          traumatic CVA    Vision loss Father          very poor vision    Cancer Maternal Grandfather           from melanoma at his 60's.    Early death Maternal Grandfather           early 60's of melanoma    Melanoma Maternal Grandfather      Diabetes  Paternal Grandmother      Hypertension Paternal Grandfather      Diabetes Maternal Uncle      Hypertension Paternal Uncle         SOCHX:  Social History     Tobacco Use    Smoking status: Never    Smokeless tobacco: Never   Substance Use Topics    Alcohol use: Not Currently     Comment: no alcohol for 8 months as of 3/31/22       ALLERGIES:  Inderal [propranolol] and Lisinopril    CURRENT MEDICATIONS:  Current Outpatient Medications on File Prior to Visit   Medication Sig Dispense Refill    amLODIPine (NORVASC) 5 MG tablet Take 1 tablet (5 mg total) by mouth once daily. 90 tablet 3    azelastine (ASTELIN) 137 mcg (0.1 %) nasal spray USE 1 SPRAY INTO EACH NOSTRIL TWICE DAILY 90 mL 3    budesonide-formoterol 160-4.5 mcg (SYMBICORT) 160-4.5 mcg/actuation HFAA Inhale 2 puffs into the lungs every 12 (twelve) hours. Controller 10.2 g 3    levocetirizine (XYZAL) 5 MG tablet Take 5 mg by mouth every evening.      meloxicam (MOBIC) 15 MG tablet Take 1 tablet (15 mg total) by mouth once daily. 30 tablet 0    potassium chloride (KLOR-CON) 10 MEQ TbSR Take 1 tablet (10 mEq total) by mouth 2 (two) times daily. 180 tablet 3    PROAIR RESPICLICK 90 mcg/actuation inhaler Inhale 2 puffs into the lungs every 6 (six) hours as needed for Wheezing or Shortness of Breath. 1 each 1    sildenafiL (VIAGRA) 50 MG tablet Take 1 tablet by mouth daily as needed for erectile dysfunction. 30 tablet 9    valsartan (DIOVAN) 320 MG tablet Take 1 tablet (320 mg total) by mouth once daily. 90 tablet 3    sumatriptan (IMITREX) 50 MG tablet Take 1 tablet (50 mg total) by mouth daily as needed for Migraine. 9 tablet 1     No current facility-administered medications on file prior to visit.       REVIEW OF SYSTEMS:  Review of Systems Complete; Negative, unless noted above.    GENERAL PHYSICAL EXAM:   There were no vitals taken for this visit.   GEN: well developed, well nourished, no acute distress   PULM: No wheezing, no respiratory distress   CV:  RRR    ORTHO EXAM:   Examination of the right hand/wrist reveals no edema, erythema ecchymosis or skin breakdown.  Positive carpal Tinel's test.  Positive Durkan's test.  Reduced sensation in the median nerve distribution.  Normal sensation in the ulnar and radial nerve distributions.  Capillary refill is 2 seconds.    RADIOLOGY:   None.    ASSESSMENT:   Right carpal tunnel syndrome    PLAN:  1. I discussed with Chad Chen that the best course of action this time is to follow up with Dr. Montanez to discuss further treatment options, as his carpal tunnel syndrome is refractory to a steroid injection.  He verbally agreed with the treatment plan     2.  He was instructed to nighttime splint in the meantime.      3. I would like to have him follow up in clinic at his next available appointment.  He was instructed to contact the clinic for any problems or concerns in the interim.

## 2024-10-15 ENCOUNTER — OFFICE VISIT (OUTPATIENT)
Dept: ALLERGY | Facility: CLINIC | Age: 44
End: 2024-10-15
Payer: COMMERCIAL

## 2024-10-15 VITALS — WEIGHT: 256.38 LBS | BODY MASS INDEX: 35.89 KG/M2 | HEIGHT: 71 IN

## 2024-10-15 DIAGNOSIS — Z87.09 HISTORY OF ASTHMA: Primary | ICD-10-CM

## 2024-10-15 DIAGNOSIS — Z88.6 NSAID SENSITIVITY: ICD-10-CM

## 2024-10-15 DIAGNOSIS — R06.1 STRIDOR: ICD-10-CM

## 2024-10-15 PROCEDURE — 99999 PR PBB SHADOW E&M-EST. PATIENT-LVL II: CPT | Mod: PBBFAC,,, | Performed by: STUDENT IN AN ORGANIZED HEALTH CARE EDUCATION/TRAINING PROGRAM

## 2024-10-15 NOTE — PROGRESS NOTES
"ALLERGY & IMMUNOLOGY  - PROCEDURE NOTE      HISTORY OF PRESENT ILLNESS   CC: challenge     HPI: Chad Chen is a 44 y.o. male w/ arthritis here for aspirin ingestion challenge.   Patient is otherwise feeling well.    PHYSICAL EXAM   Ht 5' 10.98" (1.803 m)   Wt 116.3 kg (256 lb 6.3 oz)   BMI 35.78 kg/m²   GENERAL: alert, NAD, well-appearing  EYES:no conjunctival injection  LUNGS:no increased WOB  DERM: no hives       PROCEDURE   10/15/2024   Patient tolerated cumulative dose of 162 mg Aspirin orally.They were monitored for 1 hour after last dose and did not develop symptoms of anaphylaxis.   Total time: 2h  Interpretation: No evidence of AERD or IgE mediated allergy to asprin    ASSESSMENT AND PLAN     Chad Chen is a 44 y.o. male who successfully completed aspirin challenge today.   - Ok to have Naproxen or Aspirin  - Continue to avoid Ibuprofen and Meloxicam as he has had reaction to these in past    Return to clinic as needed      "

## 2024-10-15 NOTE — PATIENT INSTRUCTIONS
Congratulations! You passed the aspirin challenge and no longer needs to avoid this. Naproxen (Aleve) should also be safe.     If you become concerned for possible reaction later, you can page us at  [phone] (dial the number, wait for 3 beeps, then dial your own phone number including area code, tap #, then wait for 3 confirmatory beeps) to reach the on-call fellow.

## 2024-10-16 ENCOUNTER — PATIENT MESSAGE (OUTPATIENT)
Dept: ALLERGY | Facility: CLINIC | Age: 44
End: 2024-10-16
Payer: COMMERCIAL

## 2024-10-17 ENCOUNTER — TELEPHONE (OUTPATIENT)
Dept: ALLERGY | Facility: CLINIC | Age: 44
End: 2024-10-17

## 2024-10-17 ENCOUNTER — OFFICE VISIT (OUTPATIENT)
Dept: ORTHOPEDICS | Facility: CLINIC | Age: 44
End: 2024-10-17
Payer: COMMERCIAL

## 2024-10-17 DIAGNOSIS — G56.01 CARPAL TUNNEL SYNDROME OF RIGHT WRIST: Primary | ICD-10-CM

## 2024-10-17 PROCEDURE — 99213 OFFICE O/P EST LOW 20 MIN: CPT | Mod: S$GLB,,, | Performed by: ORTHOPAEDIC SURGERY

## 2024-10-17 PROCEDURE — 4010F ACE/ARB THERAPY RXD/TAKEN: CPT | Mod: CPTII,S$GLB,, | Performed by: ORTHOPAEDIC SURGERY

## 2024-10-17 PROCEDURE — 99999 PR PBB SHADOW E&M-EST. PATIENT-LVL III: CPT | Mod: PBBFAC,,, | Performed by: ORTHOPAEDIC SURGERY

## 2024-10-17 PROCEDURE — 1159F MED LIST DOCD IN RCRD: CPT | Mod: CPTII,S$GLB,, | Performed by: ORTHOPAEDIC SURGERY

## 2024-10-17 PROCEDURE — 3044F HG A1C LEVEL LT 7.0%: CPT | Mod: CPTII,S$GLB,, | Performed by: ORTHOPAEDIC SURGERY

## 2024-10-17 NOTE — PROGRESS NOTES
Mr Chen returns to clinic today.  Has a history of bilateral carpal tunnel syndrome.  He had a left carpal tunnel release which he did very well from.  He was continued to have right carpal tunnel symptoms.  We have tried to treat this conservatively.  He was tried splints as well as steroid injection.  He did not have a good response from the steroid injection.  He was also had repeat nerve conduction study.  He was here today to discuss further treatment     Physical exam:  Examination of the right wrist and hand reveals that there is no edema.  There are no skin changes.  Palpation does not produce tenderness.  He does have grossly intact sensation in the median radial ulnar distribution.  Has positive Tinel's and positive Durkan's test.  He does have 2+ radial pulse     EMG/nerve conduction study: Nerve conduction study was reviewed in his a normal study     Assessment: Right carpal tunnel syndrome     Plan:     1. He was showing us pattern of carpal tunnel that is similar to his left hand.  He did have very good relief with his left carpal tunnel release and therefore I think that it was reasonable to perform a right carpal tunnel release.  After discussion of the risks and benefits the patient states that he would like to proceed with the surgery.      2. He was 3-4 weeks post steroid injections and therefore we must wait 2 additional months prior to considering setting him up for the surgery.  We will give him a tentative surgery date for the end of December at today's clinic but he will come back 2-3 weeks prior to the day to undergo an H&P add to set up the surgical procedure

## 2024-10-17 NOTE — TELEPHONE ENCOUNTER
----- Message from Iqra Menon sent at 10/17/2024  2:51 PM CDT -----  Regarding: Appointment Request  Hello,     This patient is requesting a virtual follow-up visit with Dr. Kamara to discuss the results of his aspirin challenge on 10/15/24 and next steps. Could you please help us get this appointment scheduled?    He had a reaction driving home from the Aspirin challenge with itching, coughing, and wheezing. He took an antihistamine and symptoms resolved by the time he got home.     Thank you!    Marce Menon

## 2024-10-22 ENCOUNTER — OFFICE VISIT (OUTPATIENT)
Dept: ALLERGY | Facility: CLINIC | Age: 44
End: 2024-10-22
Payer: COMMERCIAL

## 2024-10-22 DIAGNOSIS — M47.22 OSTEOARTHRITIS OF SPINE WITH RADICULOPATHY, CERVICAL REGION: Primary | ICD-10-CM

## 2024-10-22 DIAGNOSIS — Z88.6 HISTORY OF ALLERGY TO ASPIRIN: ICD-10-CM

## 2024-10-22 DIAGNOSIS — R06.1 STRIDOR: ICD-10-CM

## 2024-10-22 DIAGNOSIS — Z88.6 NSAID SENSITIVITY: Primary | ICD-10-CM

## 2024-10-22 DIAGNOSIS — G56.02 LEFT CARPAL TUNNEL SYNDROME: ICD-10-CM

## 2024-10-22 PROCEDURE — 4010F ACE/ARB THERAPY RXD/TAKEN: CPT | Mod: CPTII,95,, | Performed by: STUDENT IN AN ORGANIZED HEALTH CARE EDUCATION/TRAINING PROGRAM

## 2024-10-22 PROCEDURE — 3044F HG A1C LEVEL LT 7.0%: CPT | Mod: CPTII,95,, | Performed by: STUDENT IN AN ORGANIZED HEALTH CARE EDUCATION/TRAINING PROGRAM

## 2024-10-22 PROCEDURE — 99215 OFFICE O/P EST HI 40 MIN: CPT | Mod: 95,,, | Performed by: STUDENT IN AN ORGANIZED HEALTH CARE EDUCATION/TRAINING PROGRAM

## 2024-10-22 RX ORDER — CELECOXIB 200 MG/1
200 CAPSULE ORAL 2 TIMES DAILY PRN
Qty: 60 CAPSULE | Refills: 0 | Status: SHIPPED | OUTPATIENT
Start: 2024-10-22 | End: 2024-11-21

## 2024-10-22 NOTE — PROGRESS NOTES
"The patient location is: [place], [place]  The chief complaint leading to consultation is: follow up  Visit type: audiovisual     Face to Face time with patient: 30 minutes  45 minutes of total time spent on the encounter, which includes face to face time and non-face to face time preparing to see the patient (eg, review of tests), Obtaining and/or reviewing separately obtained history, Documenting clinical information in the electronic or other health record, Independently interpreting results (not separately reported) and communicating results to the patient/family/caregiver, or Care coordination (not separately reported).      Each patient to whom he or she provides medical services by telemedicine is:  (1) informed of the relationship between the physician and patient and the respective role of any other health care provider with respect to management of the patient; and (2) notified that he or she may decline to receive medical services by telemedicine and may withdraw from such care at any time.    ALLERGY & IMMUNOLOGY CLINIC -  Established Virtual Visit     HISTORY OF PRESENT ILLNESS     Patient ID: Chad Chen is a 44 y.o. male    CC: follow up    HPI: Chad Chen is a 44 y.o. male presents for evaluation of:    10/22/2024  Recently presented to [facility] and underwent Aspirin challenge. Developed some facial itching during procedure and was attributed to psychosomatic reaction. On drive home, approximately 2.5 hours following ingestion and 20 minutes after leaving clinic, developed progressively worsening pruritus without associated urticaria. Also was experiencing wheezing which he states originated from "the front of his throat." Administered an oral antihistamine with relief approximately 20-25 minutes later. Denies angioedema and upper respiratory nasal congestion. Denies gastrointestinal symptoms. Started Symbicort 160-4.5mcg 2 Puffs BID and albuterol every other day prior to exercise following last visit. " "Had normal spirometry performed and noted to have vocal cord dysfunction with ENT; starting breathing exercises. Believes "wheezing" episodes have improved with cooler temperatures but unsure whether Symbicort and breathing exercises relieved symptoms.       09/17/2024  NSAID Hypersensitivity?: Endorses lifelong episodes of wheezing with associated rhinitis. Previously underwent allergen immunotherapy when he lived in [place] before moving to [place] 6 years ago. States that he previously would experience 2-3 episodes of wheezing per year, but denies hospitalization and systemic steroid usage for asthma. He has never required use of a maintenance inhaler for his asthma. Albuterol had relieved symptoms previously. Denies seasonal exacerbations and known triggers for asthma. States that for the previous 4 months, has felt like his symptoms have progressively worsened and caused continued "wheezing episodes" which have not been relieved with albuterol usage as they had previously. During this time, has noticed an acute worsening within 30 minutes with use of Meloxicam and Ibuprofen which will last upwards of 8 hours. Denies urticaria, rhinitis symptoms, anosmia, conjunctival symptoms during episodes. Denies GI involvement as well.      REVIEW OF SYSTEMS     CONST: no F/C/NS, no unintentional weight changes  Balance of review of systems negative except as mentioned above     MEDICAL HISTORY     MedHx: active problems reviewed  SurgHx:   Past Surgical History:   Procedure Laterality Date    ADENOIDECTOMY  2004    CARPAL TUNNEL RELEASE Left 12/17/2020    Procedure: RELEASE, CARPAL TUNNEL;  Surgeon: Dre Montanez MD;  Location: [facility] OR;  Service: Orthopedics;  Laterality: Left;  Procedure:  Left carpal tunnel release    Position:  Supine    Anesthesia:  General equipment:  Carpal tunnel Set    LAPAROSCOPIC CHOLECYSTECTOMY  2010    laparoscopic removal gallstones    TONSILLECTOMY  2004    corrective septoplasty same " time.    TRIGGER FINGER RELEASE Right 3/31/2022    Procedure: Right middle finger trigger release;  Surgeon: Dre Montanez MD;  Location: [facility];  Service: Orthopedics;  Laterality: Right;     Allergies: see below  Medications: MAR reviewed    Otherwise No interval changes in medical history     PHYSICAL EXAM     Virtual Visit-Patient Appears well and in no distress during evaluation.     ALLERGEN TESTING     Component      Latest Ref Rng 9/17/2024   [place] Grass IgE      <0.10 kU/L 0.28 (H)    Eric Grass IgE      <0.10 kU/L 0.54 (H)    English Plantain IgE      <0.10 kU/L 0.11 (H)    [place] Tree IgE      <0.10 kU/L 0.68 (H)    [place]ec[place] Tree IgE      <0.10 kU/L 1.90 (H)    Marshelder IgE      <0.10 kU/L 0.16 (H)    Ragweed, Western IgE      <0.10 kU/L 0.47 (H)    A. alternata IgE      <0.10 kU/L 1.74 (H)    Total IgE      0 - 100 IU/mL 135 (H)       Remainder of Region 6 allergen panel negative    Component      Latest Ref Rng 9/17/2024   Eos #      0.0 - 0.5 K/uL 0.2      Normal Spirometry     ASSESSMENT/PLAN     Chad Chen is a 44 y.o. male with       1. NSAID sensitivity    2. Stridor      Longstanding stridor with noted vocal cord dysfunction and normal spirometry returns following pruritus and wheezing temporally associated with Aspirin ingestion. Low suspicion for IgE mediated hypersensitivity to Aspirin; would prefer alternative NSAID so scheduled to return for NSAID challenge in 1 week. Scheduled for CT Scan to ensure no anatomic abnormality on 10/31 (For VCD) and Celecoxib challenge 11/1 with Dr. Estrella. Recommended weaning Albuterol every other day after he undergoes surgery (Scheduled 11/11) and consider weaning of ICS-LABA there after. Suspect that vocal cord dysfunction is largely the cause of the stridor   Follow up: 1 month      Rd Nichols MD    I spent a total of 40 minutes on the day of the visit. This includes face to face time and non-face to face time preparing to see the  patient (eg, review of tests), obtaining and/or reviewing separately obtained history, documenting clinical information in the electronic or other health record, independently interpreting results and communicating results to the patient/family/caregiver, or care coordinator.

## 2024-10-22 NOTE — PATIENT INSTRUCTIONS
Attempt to wean albuterol starting 11/18  Attempt to wean symbicort 12/2     Message me via patient portal efficacy

## 2024-10-24 ENCOUNTER — PATIENT MESSAGE (OUTPATIENT)
Dept: ALLERGY | Facility: CLINIC | Age: 44
End: 2024-10-24
Payer: COMMERCIAL

## 2024-10-24 ENCOUNTER — PATIENT MESSAGE (OUTPATIENT)
Dept: FAMILY MEDICINE | Facility: CLINIC | Age: 44
End: 2024-10-24
Payer: COMMERCIAL

## 2024-10-24 DIAGNOSIS — Z00.00 PREVENTATIVE HEALTH CARE: ICD-10-CM

## 2024-10-31 ENCOUNTER — HOSPITAL ENCOUNTER (OUTPATIENT)
Dept: RADIOLOGY | Facility: HOSPITAL | Age: 44
Discharge: HOME | End: 2024-10-31
Attending: OTOLARYNGOLOGY
Payer: COMMERCIAL

## 2024-10-31 DIAGNOSIS — R06.1 STRIDOR: ICD-10-CM

## 2024-10-31 PROCEDURE — 70490 CT SOFT TISSUE NECK W/O DYE: CPT | Mod: TC,PO

## 2024-10-31 PROCEDURE — 70490 CT SOFT TISSUE NECK W/O DYE: CPT | Mod: 26,,, | Performed by: RADIOLOGY

## 2024-11-01 ENCOUNTER — OFFICE VISIT (OUTPATIENT)
Dept: ALLERGY | Facility: CLINIC | Age: 44
End: 2024-11-01
Payer: COMMERCIAL

## 2024-11-01 VITALS — BODY MASS INDEX: 35.89 KG/M2 | HEIGHT: 71 IN | WEIGHT: 256.38 LBS

## 2024-11-01 DIAGNOSIS — Z88.6 NSAID SENSITIVITY: Primary | ICD-10-CM

## 2024-11-01 PROCEDURE — 99999 PR PBB SHADOW E&M-EST. PATIENT-LVL II: CPT | Mod: PBBFAC,,, | Performed by: STUDENT IN AN ORGANIZED HEALTH CARE EDUCATION/TRAINING PROGRAM

## 2024-11-08 ENCOUNTER — ANESTHESIA EVENT (OUTPATIENT)
Dept: SURGERY | Facility: HOSPITAL | Age: 44
End: 2024-11-08
Payer: COMMERCIAL

## 2024-11-08 ENCOUNTER — LAB VISIT (OUTPATIENT)
Dept: LAB | Facility: HOSPITAL | Age: 44
End: 2024-11-08
Attending: STUDENT IN AN ORGANIZED HEALTH CARE EDUCATION/TRAINING PROGRAM
Payer: COMMERCIAL

## 2024-11-08 DIAGNOSIS — Z00.00 PREVENTATIVE HEALTH CARE: ICD-10-CM

## 2024-11-08 LAB
ALBUMIN SERPL BCP-MCNC: 3.8 G/DL (ref 3.5–5.2)
ALP SERPL-CCNC: 94 U/L (ref 40–150)
ALT SERPL W/O P-5'-P-CCNC: 31 U/L (ref 10–44)
ANION GAP SERPL CALC-SCNC: 11 MMOL/L (ref 8–16)
AST SERPL-CCNC: 20 U/L (ref 10–40)
BASOPHILS # BLD AUTO: 0.03 K/UL (ref 0–0.2)
BASOPHILS NFR BLD: 0.4 % (ref 0–1.9)
BILIRUB SERPL-MCNC: 0.6 MG/DL (ref 0.1–1)
BUN SERPL-MCNC: 16 MG/DL (ref 6–20)
CALCIUM SERPL-MCNC: 9.3 MG/DL (ref 8.7–10.5)
CHLORIDE SERPL-SCNC: 104 MMOL/L (ref 95–110)
CHOLEST SERPL-MCNC: 187 MG/DL (ref 120–199)
CHOLEST/HDLC SERPL: 5.7 {RATIO} (ref 2–5)
CO2 SERPL-SCNC: 24 MMOL/L (ref 23–29)
CREAT SERPL-MCNC: 1 MG/DL (ref 0.5–1.4)
DIFFERENTIAL METHOD BLD: ABNORMAL
EOSINOPHIL # BLD AUTO: 0.1 K/UL (ref 0–0.5)
EOSINOPHIL NFR BLD: 1.6 % (ref 0–8)
ERYTHROCYTE [DISTWIDTH] IN BLOOD BY AUTOMATED COUNT: 14.1 % (ref 11.5–14.5)
EST. GFR  (NO RACE VARIABLE): >60 ML/MIN/1.73 M^2
ESTIMATED AVG GLUCOSE: 108 MG/DL (ref 68–131)
GLUCOSE SERPL-MCNC: 96 MG/DL (ref 70–110)
HBA1C MFR BLD: 5.4 % (ref 4–5.6)
HCT VFR BLD AUTO: 43.8 % (ref 40–54)
HDLC SERPL-MCNC: 33 MG/DL (ref 40–75)
HDLC SERPL: 17.6 % (ref 20–50)
HGB BLD-MCNC: 13.9 G/DL (ref 14–18)
IMM GRANULOCYTES # BLD AUTO: 0.04 K/UL (ref 0–0.04)
IMM GRANULOCYTES NFR BLD AUTO: 0.5 % (ref 0–0.5)
LDLC SERPL CALC-MCNC: 129.2 MG/DL (ref 63–159)
LYMPHOCYTES # BLD AUTO: 2.2 K/UL (ref 1–4.8)
LYMPHOCYTES NFR BLD: 28.9 % (ref 18–48)
MCH RBC QN AUTO: 27.9 PG (ref 27–31)
MCHC RBC AUTO-ENTMCNC: 31.7 G/DL (ref 32–36)
MCV RBC AUTO: 88 FL (ref 82–98)
MONOCYTES # BLD AUTO: 0.6 K/UL (ref 0.3–1)
MONOCYTES NFR BLD: 8.1 % (ref 4–15)
NEUTROPHILS # BLD AUTO: 4.6 K/UL (ref 1.8–7.7)
NEUTROPHILS NFR BLD: 60.5 % (ref 38–73)
NONHDLC SERPL-MCNC: 154 MG/DL
NRBC BLD-RTO: 0 /100 WBC
PLATELET # BLD AUTO: 272 K/UL (ref 150–450)
PMV BLD AUTO: 10.6 FL (ref 9.2–12.9)
POTASSIUM SERPL-SCNC: 3.8 MMOL/L (ref 3.5–5.1)
PROT SERPL-MCNC: 6.9 G/DL (ref 6–8.4)
RBC # BLD AUTO: 4.99 M/UL (ref 4.6–6.2)
SODIUM SERPL-SCNC: 139 MMOL/L (ref 136–145)
TESTOST SERPL-MCNC: 334 NG/DL (ref 304–1227)
TRIGL SERPL-MCNC: 124 MG/DL (ref 30–150)
TSH SERPL DL<=0.005 MIU/L-ACNC: 0.99 UIU/ML (ref 0.4–4)
WBC # BLD AUTO: 7.65 K/UL (ref 3.9–12.7)

## 2024-11-08 PROCEDURE — 80061 LIPID PANEL: CPT | Performed by: STUDENT IN AN ORGANIZED HEALTH CARE EDUCATION/TRAINING PROGRAM

## 2024-11-08 PROCEDURE — 36415 COLL VENOUS BLD VENIPUNCTURE: CPT | Mod: PN | Performed by: STUDENT IN AN ORGANIZED HEALTH CARE EDUCATION/TRAINING PROGRAM

## 2024-11-08 PROCEDURE — 84403 ASSAY OF TOTAL TESTOSTERONE: CPT | Performed by: STUDENT IN AN ORGANIZED HEALTH CARE EDUCATION/TRAINING PROGRAM

## 2024-11-08 PROCEDURE — 84443 ASSAY THYROID STIM HORMONE: CPT | Performed by: STUDENT IN AN ORGANIZED HEALTH CARE EDUCATION/TRAINING PROGRAM

## 2024-11-08 PROCEDURE — 80053 COMPREHEN METABOLIC PANEL: CPT | Performed by: STUDENT IN AN ORGANIZED HEALTH CARE EDUCATION/TRAINING PROGRAM

## 2024-11-08 PROCEDURE — 83036 HEMOGLOBIN GLYCOSYLATED A1C: CPT | Performed by: STUDENT IN AN ORGANIZED HEALTH CARE EDUCATION/TRAINING PROGRAM

## 2024-11-08 PROCEDURE — 85025 COMPLETE CBC W/AUTO DIFF WBC: CPT | Performed by: STUDENT IN AN ORGANIZED HEALTH CARE EDUCATION/TRAINING PROGRAM

## 2024-11-11 ENCOUNTER — HOSPITAL ENCOUNTER (OUTPATIENT)
Facility: HOSPITAL | Age: 44
Discharge: HOME | End: 2024-11-11
Attending: ORTHOPAEDIC SURGERY | Admitting: ORTHOPAEDIC SURGERY
Payer: COMMERCIAL

## 2024-11-11 ENCOUNTER — ANESTHESIA (OUTPATIENT)
Dept: SURGERY | Facility: HOSPITAL | Age: 44
End: 2024-11-11
Payer: COMMERCIAL

## 2024-11-11 ENCOUNTER — HOSPITAL ENCOUNTER (OUTPATIENT)
Dept: RADIOLOGY | Facility: HOSPITAL | Age: 44
Discharge: HOME | End: 2024-11-11
Attending: ORTHOPAEDIC SURGERY | Admitting: ORTHOPAEDIC SURGERY
Payer: COMMERCIAL

## 2024-11-11 DIAGNOSIS — M92.61 HAGLUND'S DEFORMITY OF RIGHT HEEL: ICD-10-CM

## 2024-11-11 DIAGNOSIS — Q79.8 CONGENITAL CONTRACTURE OF RIGHT GASTROCNEMIUS MUSCLE: ICD-10-CM

## 2024-11-11 DIAGNOSIS — M25.571 RIGHT ANKLE PAIN: ICD-10-CM

## 2024-11-11 DIAGNOSIS — M76.60 INSERTIONAL ACHILLES TENDINOPATHY: Primary | ICD-10-CM

## 2024-11-11 PROCEDURE — C9290 INJ, BUPIVACAINE LIPOSOME: HCPCS | Mod: PO | Performed by: ANESTHESIOLOGY

## 2024-11-11 PROCEDURE — 37000008 HC ANESTHESIA 1ST 15 MINUTES: Mod: PO | Performed by: ORTHOPAEDIC SURGERY

## 2024-11-11 PROCEDURE — 27687 REVISION OF CALF TENDON: CPT | Mod: 51,RT,, | Performed by: ORTHOPAEDIC SURGERY

## 2024-11-11 PROCEDURE — 27201423 OPTIME MED/SURG SUP & DEVICES STERILE SUPPLY: Mod: PO | Performed by: ORTHOPAEDIC SURGERY

## 2024-11-11 PROCEDURE — 28118 REMOVAL OF HEEL BONE: CPT | Mod: 51,RT,, | Performed by: ORTHOPAEDIC SURGERY

## 2024-11-11 PROCEDURE — 27650 REPAIR ACHILLES TENDON: CPT | Mod: RT,,, | Performed by: ORTHOPAEDIC SURGERY

## 2024-11-11 PROCEDURE — D9220A PRA ANESTHESIA: Mod: ANES,,, | Performed by: ANESTHESIOLOGY

## 2024-11-11 PROCEDURE — 63600175 PHARM REV CODE 636 W HCPCS: Mod: PO | Performed by: ANESTHESIOLOGY

## 2024-11-11 PROCEDURE — 27200665 HC NERVE BLOCK NEEDLE/ CATHETER: Mod: PO | Performed by: ANESTHESIOLOGY

## 2024-11-11 PROCEDURE — 76000 FLUOROSCOPY <1 HR PHYS/QHP: CPT | Mod: TC,PO

## 2024-11-11 PROCEDURE — 71000016 HC POSTOP RECOV ADDL HR: Mod: PO | Performed by: ORTHOPAEDIC SURGERY

## 2024-11-11 PROCEDURE — 36000708 HC OR TIME LEV III 1ST 15 MIN: Mod: PO | Performed by: ORTHOPAEDIC SURGERY

## 2024-11-11 PROCEDURE — C1769 GUIDE WIRE: HCPCS | Mod: PO | Performed by: ORTHOPAEDIC SURGERY

## 2024-11-11 PROCEDURE — 37000009 HC ANESTHESIA EA ADD 15 MINS: Mod: PO | Performed by: ORTHOPAEDIC SURGERY

## 2024-11-11 PROCEDURE — D9220A PRA ANESTHESIA: Mod: CRNA,,, | Performed by: NURSE ANESTHETIST, CERTIFIED REGISTERED

## 2024-11-11 PROCEDURE — 64445 NJX AA&/STRD SCIATIC NRV IMG: CPT | Mod: PO | Performed by: ANESTHESIOLOGY

## 2024-11-11 PROCEDURE — 25000003 PHARM REV CODE 250: Mod: PO | Performed by: NURSE ANESTHETIST, CERTIFIED REGISTERED

## 2024-11-11 PROCEDURE — 27800903 OPTIME MED/SURG SUP & DEVICES OTHER IMPLANTS: Mod: PO | Performed by: ORTHOPAEDIC SURGERY

## 2024-11-11 PROCEDURE — 25000003 PHARM REV CODE 250: Mod: PO | Performed by: ORTHOPAEDIC SURGERY

## 2024-11-11 PROCEDURE — 71000015 HC POSTOP RECOV 1ST HR: Mod: PO | Performed by: ORTHOPAEDIC SURGERY

## 2024-11-11 PROCEDURE — 36000709 HC OR TIME LEV III EA ADD 15 MIN: Mod: PO | Performed by: ORTHOPAEDIC SURGERY

## 2024-11-11 PROCEDURE — 63600175 PHARM REV CODE 636 W HCPCS: Mod: PO | Performed by: NURSE ANESTHETIST, CERTIFIED REGISTERED

## 2024-11-11 PROCEDURE — 63600175 PHARM REV CODE 636 W HCPCS: Mod: PO | Performed by: ORTHOPAEDIC SURGERY

## 2024-11-11 PROCEDURE — 71000033 HC RECOVERY, INTIAL HOUR: Mod: PO | Performed by: ORTHOPAEDIC SURGERY

## 2024-11-11 DEVICE — IMPLANTABLE DEVICE: Type: IMPLANTABLE DEVICE | Site: HEEL | Status: FUNCTIONAL

## 2024-11-11 RX ORDER — VASOPRESSIN 20 [USP'U]/ML
INJECTION, SOLUTION INTRAMUSCULAR; SUBCUTANEOUS
Status: DISCONTINUED | OUTPATIENT
Start: 2024-11-11 | End: 2024-11-11

## 2024-11-11 RX ORDER — PROPOFOL 10 MG/ML
VIAL (ML) INTRAVENOUS
Status: DISCONTINUED | OUTPATIENT
Start: 2024-11-11 | End: 2024-11-11

## 2024-11-11 RX ORDER — FENTANYL CITRATE 50 UG/ML
25 INJECTION, SOLUTION INTRAMUSCULAR; INTRAVENOUS EVERY 5 MIN PRN
Status: DISCONTINUED | OUTPATIENT
Start: 2024-11-11 | End: 2024-11-11 | Stop reason: HOSPADM

## 2024-11-11 RX ORDER — MIDAZOLAM HYDROCHLORIDE 1 MG/ML
INJECTION INTRAMUSCULAR; INTRAVENOUS
Status: DISCONTINUED | OUTPATIENT
Start: 2024-11-11 | End: 2024-11-11

## 2024-11-11 RX ORDER — BUPIVACAINE 13.3 MG/ML
INJECTION, SUSPENSION, LIPOSOMAL INFILTRATION
Status: COMPLETED | OUTPATIENT
Start: 2024-11-11 | End: 2024-11-11

## 2024-11-11 RX ORDER — DEXAMETHASONE SODIUM PHOSPHATE 4 MG/ML
8 INJECTION, SOLUTION INTRA-ARTICULAR; INTRALESIONAL; INTRAMUSCULAR; INTRAVENOUS; SOFT TISSUE
Status: COMPLETED | OUTPATIENT
Start: 2024-11-11 | End: 2024-11-11

## 2024-11-11 RX ORDER — HYDROMORPHONE HYDROCHLORIDE 2 MG/ML
0.2 INJECTION, SOLUTION INTRAMUSCULAR; INTRAVENOUS; SUBCUTANEOUS EVERY 5 MIN PRN
Status: DISCONTINUED | OUTPATIENT
Start: 2024-11-11 | End: 2024-11-11 | Stop reason: HOSPADM

## 2024-11-11 RX ORDER — SODIUM CHLORIDE, SODIUM LACTATE, POTASSIUM CHLORIDE, CALCIUM CHLORIDE 600; 310; 30; 20 MG/100ML; MG/100ML; MG/100ML; MG/100ML
INJECTION, SOLUTION INTRAVENOUS CONTINUOUS
Status: DISCONTINUED | OUTPATIENT
Start: 2024-11-11 | End: 2024-11-11 | Stop reason: HOSPADM

## 2024-11-11 RX ORDER — FENTANYL CITRATE 50 UG/ML
INJECTION, SOLUTION INTRAMUSCULAR; INTRAVENOUS
Status: DISCONTINUED | OUTPATIENT
Start: 2024-11-11 | End: 2024-11-11

## 2024-11-11 RX ORDER — ONDANSETRON HYDROCHLORIDE 2 MG/ML
INJECTION, SOLUTION INTRAVENOUS
Status: DISCONTINUED | OUTPATIENT
Start: 2024-11-11 | End: 2024-11-11

## 2024-11-11 RX ORDER — LIDOCAINE HYDROCHLORIDE 10 MG/ML
1 INJECTION, SOLUTION EPIDURAL; INFILTRATION; INTRACAUDAL; PERINEURAL ONCE
Status: DISCONTINUED | OUTPATIENT
Start: 2024-11-11 | End: 2024-11-11 | Stop reason: HOSPADM

## 2024-11-11 RX ORDER — OXYCODONE HYDROCHLORIDE 5 MG/1
5 TABLET ORAL
Status: DISCONTINUED | OUTPATIENT
Start: 2024-11-11 | End: 2024-11-11 | Stop reason: HOSPADM

## 2024-11-11 RX ORDER — KETAMINE HCL IN 0.9 % NACL 50 MG/5 ML
SYRINGE (ML) INTRAVENOUS
Status: DISCONTINUED | OUTPATIENT
Start: 2024-11-11 | End: 2024-11-11

## 2024-11-11 RX ORDER — OXYCODONE AND ACETAMINOPHEN 5; 325 MG/1; MG/1
1 TABLET ORAL
Qty: 42 TABLET | Refills: 0 | Status: SHIPPED | OUTPATIENT
Start: 2024-11-11 | End: 2024-11-18

## 2024-11-11 RX ORDER — MUPIROCIN 20 MG/G
OINTMENT TOPICAL
Status: DISCONTINUED | OUTPATIENT
Start: 2024-11-11 | End: 2024-11-11 | Stop reason: HOSPADM

## 2024-11-11 RX ORDER — CEFAZOLIN SODIUM 1 G/3ML
2 INJECTION, POWDER, FOR SOLUTION INTRAMUSCULAR; INTRAVENOUS
Status: COMPLETED | OUTPATIENT
Start: 2024-11-11 | End: 2024-11-11

## 2024-11-11 RX ORDER — METHOCARBAMOL 500 MG/1
500 TABLET, FILM COATED ORAL EVERY 8 HOURS PRN
Qty: 42 TABLET | Refills: 0 | Status: SHIPPED | OUTPATIENT
Start: 2024-11-11 | End: 2024-11-25

## 2024-11-11 RX ORDER — ONDANSETRON HYDROCHLORIDE 8 MG/1
8 TABLET, FILM COATED ORAL EVERY 12 HOURS PRN
Qty: 20 TABLET | Refills: 0 | Status: SHIPPED | OUTPATIENT
Start: 2024-11-11 | End: 2024-11-21

## 2024-11-11 RX ORDER — PHENYLEPHRINE HYDROCHLORIDE 10 MG/ML
INJECTION INTRAVENOUS
Status: DISCONTINUED | OUTPATIENT
Start: 2024-11-11 | End: 2024-11-11

## 2024-11-11 RX ORDER — LIDOCAINE HYDROCHLORIDE 20 MG/ML
INJECTION INTRAVENOUS
Status: DISCONTINUED | OUTPATIENT
Start: 2024-11-11 | End: 2024-11-11

## 2024-11-11 RX ORDER — ONDANSETRON HYDROCHLORIDE 2 MG/ML
4 INJECTION, SOLUTION INTRAVENOUS ONCE
Status: COMPLETED | OUTPATIENT
Start: 2024-11-11 | End: 2024-11-11

## 2024-11-11 RX ORDER — GLUCAGON 1 MG
1 KIT INJECTION
Status: DISCONTINUED | OUTPATIENT
Start: 2024-11-11 | End: 2024-11-11 | Stop reason: HOSPADM

## 2024-11-11 RX ORDER — ACETAMINOPHEN 10 MG/ML
INJECTION, SOLUTION INTRAVENOUS
Status: DISCONTINUED | OUTPATIENT
Start: 2024-11-11 | End: 2024-11-11

## 2024-11-11 RX ORDER — ROCURONIUM BROMIDE 10 MG/ML
INJECTION, SOLUTION INTRAVENOUS
Status: DISCONTINUED | OUTPATIENT
Start: 2024-11-11 | End: 2024-11-11

## 2024-11-11 RX ORDER — BUPIVACAINE HYDROCHLORIDE 5 MG/ML
INJECTION, SOLUTION EPIDURAL; INTRACAUDAL
Status: COMPLETED | OUTPATIENT
Start: 2024-11-11 | End: 2024-11-11

## 2024-11-11 RX ORDER — MIDAZOLAM HYDROCHLORIDE 1 MG/ML
0.5 INJECTION, SOLUTION INTRAMUSCULAR; INTRAVENOUS
Status: DISCONTINUED | OUTPATIENT
Start: 2024-11-11 | End: 2024-11-11 | Stop reason: HOSPADM

## 2024-11-11 RX ADMIN — LIDOCAINE HYDROCHLORIDE 100 MG: 20 INJECTION INTRAVENOUS at 07:11

## 2024-11-11 RX ADMIN — PHENYLEPHRINE HYDROCHLORIDE 100 MCG: 10 INJECTION INTRAVENOUS at 07:11

## 2024-11-11 RX ADMIN — MUPIROCIN: 20 OINTMENT TOPICAL at 06:11

## 2024-11-11 RX ADMIN — ACETAMINOPHEN 1000 MG: 10 INJECTION INTRAVENOUS at 08:11

## 2024-11-11 RX ADMIN — PHENYLEPHRINE HYDROCHLORIDE 100 MCG: 10 INJECTION INTRAVENOUS at 08:11

## 2024-11-11 RX ADMIN — FENTANYL CITRATE 50 MCG: 0.05 INJECTION, SOLUTION INTRAMUSCULAR; INTRAVENOUS at 07:11

## 2024-11-11 RX ADMIN — PROPOFOL 200 MG: 10 INJECTION, EMULSION INTRAVENOUS at 07:11

## 2024-11-11 RX ADMIN — CEFAZOLIN 2 G: 330 INJECTION, POWDER, FOR SOLUTION INTRAMUSCULAR; INTRAVENOUS at 07:11

## 2024-11-11 RX ADMIN — VASOPRESSIN 2 UNITS: 20 INJECTION, SOLUTION INTRAMUSCULAR; SUBCUTANEOUS at 09:11

## 2024-11-11 RX ADMIN — MIDAZOLAM 2 MG: 1 INJECTION INTRAMUSCULAR; INTRAVENOUS at 07:11

## 2024-11-11 RX ADMIN — MIDAZOLAM HYDROCHLORIDE 2 MG: 1 INJECTION, SOLUTION INTRAMUSCULAR; INTRAVENOUS at 07:11

## 2024-11-11 RX ADMIN — Medication 30 MG: at 07:11

## 2024-11-11 RX ADMIN — SODIUM CHLORIDE, POTASSIUM CHLORIDE, SODIUM LACTATE AND CALCIUM CHLORIDE: 600; 310; 30; 20 INJECTION, SOLUTION INTRAVENOUS at 08:11

## 2024-11-11 RX ADMIN — DEXAMETHASONE SODIUM PHOSPHATE 8 MG: 4 INJECTION INTRA-ARTICULAR; INTRALESIONAL; INTRAMUSCULAR; INTRAVENOUS; SOFT TISSUE at 06:11

## 2024-11-11 RX ADMIN — ROCURONIUM BROMIDE 40 MG: 10 INJECTION, SOLUTION INTRAVENOUS at 07:11

## 2024-11-11 RX ADMIN — ONDANSETRON 4 MG: 2 INJECTION INTRAMUSCULAR; INTRAVENOUS at 10:11

## 2024-11-11 RX ADMIN — BUPIVACAINE 20 ML: 13.3 INJECTION, SUSPENSION, LIPOSOMAL INFILTRATION at 07:11

## 2024-11-11 RX ADMIN — FENTANYL CITRATE 100 MCG: 50 INJECTION INTRAMUSCULAR; INTRAVENOUS at 07:11

## 2024-11-11 RX ADMIN — ROCURONIUM BROMIDE 10 MG: 10 INJECTION, SOLUTION INTRAVENOUS at 07:11

## 2024-11-11 RX ADMIN — ONDANSETRON 8 MG: 2 INJECTION, SOLUTION INTRAMUSCULAR; INTRAVENOUS at 07:11

## 2024-11-11 RX ADMIN — SODIUM CHLORIDE, POTASSIUM CHLORIDE, SODIUM LACTATE AND CALCIUM CHLORIDE: 600; 310; 30; 20 INJECTION, SOLUTION INTRAVENOUS at 06:11

## 2024-11-11 RX ADMIN — VASOPRESSIN 2 UNITS: 20 INJECTION, SOLUTION INTRAMUSCULAR; SUBCUTANEOUS at 08:11

## 2024-11-11 RX ADMIN — SUGAMMADEX 200 MG: 100 INJECTION, SOLUTION INTRAVENOUS at 09:11

## 2024-11-11 RX ADMIN — BUPIVACAINE HYDROCHLORIDE 10 ML: 5 INJECTION, SOLUTION EPIDURAL; INTRACAUDAL; PERINEURAL at 07:11

## 2024-11-11 NOTE — BRIEF OP NOTE
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  Positive Silfverskiold preoperatively.    Estimated Blood Loss: 5mL.         Specimens:   Specimen (24h ago, onward)      None

## 2024-11-11 NOTE — H&P
Status/Diagnosis: Right gastroc contracture and AICT.  Date of Surgery: none  Date of Injury: none; DOO March 2023  Return visit: 2 weeks postop  X-rays on Return: none     Chief Complaint:       Chief Complaint   Patient presents with    Right Foot - Pain      Present History:  Chad Chen is a 44 y.o. male who presents today via referral from Dr. Alex Thao.  Patient endorses an approximately 4 month history worsening right posterior heel pain.  Noticed a bump over the area of concern more recently.  Denies any history of injury or other inciting event.  Patient has minimal pain at rest, increased with weight-bearing and also pain due to irritation from mass effect.  Denies any numbness or tingling.  Recently seen by my partner, Dr. Thao, for evaluation and treatment.  Discussed the use of a heel lift, anti-inflammatories, and physical therapy.  Patient reports that he called to make his 1st appointment with PT however we will not be able to be seen for the next 3 weeks.  Was taking over-the-counter oral ibuprofen as needed for pain intermittently but with minimal relief.  Past medical history significant for hypertension and asthma.  Denies tobacco use.  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     07/23/2024:  Patient returns today for repeat clinical evaluation and to discuss possible surgical intervention.    Still with moderate persistent pain, 7/10.  No new history of injury or other inciting event.    Pain almost exclusively with mass effect.    Specifically mentioned severe pain when wearing shoes with a back but really only while in a seated position as this causes much more irritation than when standing in the same shoes.  Denies any numbness or tingling.             Past Medical History:   Diagnosis Date    Asthma       exercise induced    Cardiac murmur      Dyslipidemia      Hypertension      Obesity      PONV (postoperative nausea and vomiting)      Seasonal and perennial allergic rhinitis                  Past Surgical History:   Procedure Laterality Date    ADENOIDECTOMY   2004    CARPAL TUNNEL RELEASE Left 12/17/2020     Procedure: RELEASE, CARPAL TUNNEL;  Surgeon: Dre Montanez MD;  Location: [facility] OR;  Service: Orthopedics;  Laterality: Left;  Procedure:  Left carpal tunnel release     Position:  Supine     Anesthesia:  General equipment:  Carpal tunnel Set    LAPAROSCOPIC CHOLECYSTECTOMY   2010     laparoscopic removal gallstones    TONSILLECTOMY   2004     corrective septoplasty same time.    TRIGGER FINGER RELEASE Right 3/31/2022     Procedure: Right middle finger trigger release;  Surgeon: Dre Montanez MD;  Location: [facility] OR;  Service: Orthopedics;  Laterality: Right;              Current Outpatient Medications   Medication Sig    amLODIPine (NORVASC) 5 MG tablet TAKE 1 TABLET(5 MG) BY MOUTH EVERY DAY    azelastine (ASTELIN) 137 mcg (0.1 %) nasal spray USE 1 SPRAY INTO EACH NOSTRIL TWICE DAILY    levocetirizine (XYZAL) 5 MG tablet Take 5 mg by mouth every evening.    meloxicam (MOBIC) 15 MG tablet Take 1 tablet (15 mg total) by mouth once daily.    potassium chloride (KLOR-CON) 10 MEQ TbSR Take 1 tablet by mouth twice daily.    PROAIR RESPICLICK 90 mcg/actuation inhaler Inhale 2 puffs into the lungs every 6 (six) hours as needed for Wheezing or Shortness of Breath.    sildenafiL (VIAGRA) 50 MG tablet Take 1 tablet by mouth daily as needed for erectile dysfunction.    valsartan (DIOVAN) 320 MG tablet TAKE 1 TABLET(320 MG) BY MOUTH EVERY DAY    sumatriptan (IMITREX) 50 MG tablet Take 1 tablet (50 mg total) by mouth daily as needed for Migraine.      No current facility-administered medications for this visit.               Review of patient's allergies indicates:   Allergen Reactions    Inderal [propranolol] Hives    Lisinopril Other (See Comments)       cough                Family History   Problem Relation Name Age of Onset    Cancer Mother             triple breast cancer at 2 different times.      Breast cancer Mother        Heart disease Father             2V CABG at 45; 12 coronary stents.    Hyperlipidemia Father        Mental illness Father             MIKALA    Stroke Father             traumatic CVA    Vision loss Father             very poor vision    Cancer Maternal Grandfather              from melanoma at his 60's.    Early death Maternal Grandfather              early 60's of melanoma    Melanoma Maternal Grandfather        Diabetes Paternal Grandmother        Hypertension Paternal Grandfather        Diabetes Maternal Uncle        Hypertension Paternal Uncle             Social History            Socioeconomic History    Marital status:    Occupational History    Occupation:  for sales force.    Tobacco Use    Smoking status: Never    Smokeless tobacco: Never   Substance and Sexual Activity    Alcohol use: Not Currently       Comment: no alcohol for 8 months as of 3/31/22    Drug use: Yes       Types: Marijuana      Social Determinants of Health           Financial Resource Strain: Low Risk  (2024)     Overall Financial Resource Strain (CARDIA)      Difficulty of Paying Living Expenses: Not hard at all   Food Insecurity: No Food Insecurity (2024)     Hunger Vital Sign      Worried About Running Out of Food in the Last Year: Never true      Ran Out of Food in the Last Year: Never true   Transportation Needs: No Transportation Needs (2024)     PRAPARE - Transportation      Lack of Transportation (Medical): No      Lack of Transportation (Non-Medical): No   Physical Activity: Insufficiently Active (2024)     Exercise Vital Sign      Days of Exercise per Week: 3 days      Minutes of Exercise per Session: 40 min   Stress: Stress Concern Present (2024)     [employer] - Occupational Stress Questionnaire      Feeling of Stress : To some extent   Housing Stability: Low Risk  (2024)     Housing Stability Vital Sign       Unable to Pay for Housing in the Last Year: No      Number of Places Lived in the Last Year: 1      Unstable Housing in the Last Year: No         Physical exam:  There were no vitals filed for this visit.  There is no height or weight on file to calculate BMI.  General: In no apparent distress; well developed and well nourished.  HEENT: normocephalic; atraumatic.  Cardiovascular: regular rate.  Respiratory: no increased work of breathing.  Musculoskeletal:   Gait: mild antalgic  Inspection:   Patient with large bony prominence over the posterior aspect of the heel at the level of the Achilles insertion.  No skin tenting or breakdown is present.  Moderate tenderness on deep palpation at this site.  No palpable defect along the Achilles tendon.  Good tension with Ramirez testing.  With the patient prone and knees flexed 90°, symmetric resting ankle plantar flexion.  No pain referable to the foot.  No laxity with anterior drawer testing.  Silfverskiold: significant positive  Alignment:  Knee: neutral               Ankle: neutral              Hindfoot: neutral              Forefoot: neutral   Strength:              Dorsiflexion 5/5  Plantar flexion 5/5  Inversion 5/5   Eversion 5/5   Sensation:              SILT distally   ROM:              Ankle: Full and painless.              Subtalar: Painless inversion and eversion   Pulses: 2+ DP/PT pulses.                   Imaging Studies/Outside documentation:  I have ordered/reviewed/interpreted the following images/outside documentation:  1. Weight bearing 3-views of Right foot and ankle:   On my independent review, no acute bony abnormality noted.  Large enthesophyte at the Achilles insertion with adjacent Maurisio deformity.  Congruent ankle mortise.  No significant degenerative joint changes.        Assessment:  Chad Chen is a 44 y.o. male with Right gastroc contracture and AICT.     Plan:   Clinical and radiographic findings were discussed. Operative vs nonoperative  treatment options were described. These include but are not limited to bleeding; infection; damage to surrounding nerves or vessels; persistent pain, stiffness; recurrent deformity; need for additional procedures; amputation; blood clots; pulmonary embolus; cardiac events; stroke; and the general risks of anesthesia including anesthetic death.   We also reviewed postop protocol as well as limitations and expectations. Patient's symptoms have persisted despite conservative management to include but not limited to shoe wear and activity modifications; anti-inflammatories; boot/brace immobilization; and continue to affect the patient's quality of life. Patient understands and desires to proceed with surgical intervention.   Explained risks, benefits, and alternative to the patient. Asked if any questions--none.  Surgery to include but not limited to:   Right Achilles tendon debridement and repair; Maurisio resection; possible gastrocnemius recession; surgery as indicated.     Specifically discussed extensive postop protocol.  Patient tentatively will be 5 weeks nonweightbearing followed by an additional 5-6 weeks in the boot with progressive weight-bearing and heel wedges in place.        On questioning, patient mentioned reaction to oral NSAID use thus we will hold off in the perioperative period.  Also mentioned significant burning sensation with previous use of gauze wrap.  Denies itching specifically.  On further questioning, denies issues with 4 x 4 gauze itself.  I believe he was referencing the cast padding used for either a soft dressing or a splint.  I reiterated no great alternative to this.  We will try to minimize splint wear in the acute postoperative period.           This note was created using voice recognition software and may contain grammatical errors.

## 2024-11-11 NOTE — ANESTHESIA PREPROCEDURE EVALUATION
11/11/2024  Chad Chen is a 44 y.o., male.      Pre-op Assessment    I have reviewed the Patient Summary Reports.     I have reviewed the Nursing Notes. I have reviewed the NPO Status.   I have reviewed the Medications.     Review of Systems  Anesthesia Hx:  No problems with previous Anesthesia                Cardiovascular:     Hypertension           hyperlipidemia                         Hypertension         Pulmonary:    Asthma       Asthma:               Musculoskeletal:  Arthritis        Arthritis     Spine Disorders: cervical            Neurological:    Neuromuscular Disease,           Arthritis                         Neuromuscular Disease   Endocrine:        Obesity / BMI > 30      Physical Exam  General: Well nourished    Airway:  Mallampati: II   Mouth Opening: Normal  TM Distance: Normal  Neck ROM: Normal ROM        Anesthesia Plan  Type of Anesthesia, risks & benefits discussed:    Anesthesia Type: Gen Supraglottic Airway  Intra-op Monitoring Plan: Standard ASA Monitors  Post Op Pain Control Plan: peripheral nerve block and multimodal analgesia  Induction:  IV  Informed Consent: Informed consent signed with the Patient and all parties understand the risks and agree with anesthesia plan.  All questions answered.   ASA Score: 2    Ready For Surgery From Anesthesia Perspective.     .

## 2024-11-11 NOTE — ANESTHESIA POSTPROCEDURE EVALUATION
Anesthesia Post Evaluation    Patient: Chad Chen    Procedure(s) Performed: Procedure(s) (LRB):  REPAIR, TENDON, ACHILLES (Right)  EXCISION,JULIA DEFORMITY,CALCANEUS (Right)  RECESSION, MUSCLE, GASTROCNEMIUS OPEN (Right)    Final Anesthesia Type: general      Patient location during evaluation: PACU  Patient participation: Yes- Able to Participate  Level of consciousness: awake  Post-procedure vital signs: reviewed and stable  Pain management: adequate  Airway patency: patent    PONV status at discharge: nausea (controlled)  Anesthetic complications: no      Cardiovascular status: blood pressure returned to baseline  Respiratory status: unassisted  Hydration status: euvolemic  Follow-up not needed.              Vitals Value Taken Time   /93 11/11/24 1140   Temp  11/11/24 1222   Pulse 67 11/11/24 1140   Resp 16 11/11/24 1140   SpO2 96 % 11/11/24 1140         Event Time   Out of Recovery 10:38:38         Pain/Shellie Score: Pain Rating Prior to Med Admin: 4 (11/11/2024  7:05 AM)  Pain Rating Post Med Admin: 0 (11/11/2024  7:08 AM)  Shellie Score: 9 (11/11/2024 10:37 AM)

## 2024-11-11 NOTE — OP NOTE
Date of Surgery: 11/11/2024    Patient: Chad Chen     Pre-Operative Diagnosis:  Right achilles insertional calcific tendinopathy.  Right achilles tendon tear.  Symptomatic enthesophyte and Maurisio deformity at the Right achilles insertion.  Right gastrocnemius contracture.     Post-Operative Diagnosis:  Right achilles insertional calcific tendinopathy.  Right achilles tendon tear.  Symptomatic enthesophyte and Maurisio deformity at the Right achilles insertion.  Right gastrocnemius contracture.     Procedures:  Right achilles tendon debridement and primary repair. 91712.  Right achilles insertional enthesophyte exostectomy and Maurisio resection. 61358.   Right gastrocnemius recession. 68722.     Surgeon: Lew Helton MD     Assist: TAMARA Díaz.     Anesthesia: General with regional block.     Estimated Blood Loss: 5mL.     Drains: None.     Findings: Some degree of mucoid degenerative changes involving the distal Achilles at its insertion. Positive Silfverskiold preoperatively.      Implants:   Arthrex achilles speed bridge system (3.9mm anchors).     Operative Indication:  Chad Chen is a 44-year-old male who presented to my clinic with a long-standing history of persistent right Achilles insertional pain.  Patient was failed conservative treatment measures up to this point.  Operative versus non operative treatment options were discussed. Risks and benefits were described.  These include but are not limited to bleeding; infection; damage to surrounding nerves or vessels; persistent pain, stiffness;  need for additional procedures; amputation; blood clots; pulmonary embolus; cardiac events; stroke; and the general risks of anesthesia including anesthetic death.   Patient has failed conservative treatment measures including not limited to immobilization, physical therapy, oral NSAIDs. We also reviewed postop protocol as well as limitations and expectations. Patient understands and desires to proceed with  surgical intervention. Consent was obtained and the right lower leg was marked.     Operative Procedure:  The patient was transferred to the operating room and placed under general endotracheal anesthesia by the anesthesiology service.  Patient was then placed in a prone position on the OR table.  All bony prominences were appropriately padded.  Patient was secured to the table.  A nonsterile tourniquet was placed on the left thigh.  An SCD was placed on the contralateral lower limb.  The patient was prepped and draped in usual sterile fashion. A time-out was then called.  The patient, procedure, surgical site was verified and everyone was in agreement.  Preoperative IV antibiotics were administered. The surgical extremity was exsanguinated with an esmarch bandage and the tourniquet was inflated to 300mmHg.  A 6 cm longitudinal midline incision was made over the Achilles insertion.  Subcutaneous vessels were cauterized.  Dissection was taken deep down to the level of the paratenon which was then sharply incised.  This was reflected for later repair.  A midline longitudinal incision was made within the Achilles tendon itself.  Some degree of mucoid degenerative changes were noted within the distal most aspect of the Achilles tendon.  These were debrided to healthy-appearing midsubstance tendon. The tendon was reflected both medially and laterally to allow for exposure to the large enthesophyte.  Retrocalcaneal bursal resection was performed without complication.    Attention was turned to the enthesophyte and adjacent Maurisio deformity.  These were removed using combination of microsagittal saw and rasp to remove any potential areas of residual prominence.  After satisfactory exostectomy was performed and confirmed both clinically and under fluoroscopy, planned anchor placement for the Arthrex speed bridge was performed.  Drill holes x4 were placed.  These were then subsequently tapped.  3.9mm anchors were placed.  The proximal row also incorporated suture tape for a more proximal stich to reinforce the speed bridge repair.   The Achilles tendon was repaired 0 Vicryl.  The ankle was then placed in approximately 20° plantar flexion.    Silfverskiold tested was found to be positive preoperatively.    Attention was turned toward the gastrocnemius aponeurosis.  A 3 cm incision was made 2 cm distal to the distal margin of the medial gastroc muscle belly.  Subcutaneous vessels were cauterized.  The sural nerve and lesser saphenous vein were identified and protected.  The crural fascia was sharply incised and tagged for later repair. The gastrocnemius aponeurosis was identified deep and cut using a #15 blade while holding the ankle in maximum dorsiflexion. Range of motion improved by 10-15 degrees dorsiflexion. The wound was irrigated and closed in a standard layered fashion using 2-0 vicryl for the crural fascia, 3-0 monocryl subQ and 3-0 nylon on the skin.     Attention was turned back to the Achilles where while holding appropriate tension the FiberTape sutures were then placed through the SwiveLock anchor in the distal stump of the Achilles tendon was tacked down satisfactorily.  Good tension on Ramirez testing after Achilles repair.  All wounds were copiously irrigated.  The tourniquet was let down and good hemostasis was obtained.   The distal Achilles wound was then closed in a standard layered fashion using 3-0 Monocryl for the paratenon, followed by 3-0 Monocryl subcuticularly, and 3-0 nylon on the skin.  Sterile dressings including Xeroform, 4 x 4 gauze, cast padding, and a well-padded plantar flexion short-leg plaster splint were then placed.  Patient was then reversed from anesthesia and transferred to the recovery room in stable condition.     Lew Helton MD

## 2024-11-11 NOTE — ANESTHESIA PROCEDURE NOTES
Peripheral Block    Patient location during procedure: pre-op   Block not for primary anesthetic.  Reason for block: at surgeon's request and post-op pain management   Post-op Pain Location: right foot pain   Start time: 11/11/2024 7:05 AM  Timeout: 11/11/2024 7:02 AM   End time: 11/11/2024 7:10 AM    Staffing  Authorizing Provider: Alex Johnson MD  Performing Provider: Alex Johnson MD    Staffing  Performed by: Alex Johnson MD  Authorized by: Alex Johnson MD    Preanesthetic Checklist  Completed: patient identified, IV checked, site marked, risks and benefits discussed, surgical consent, monitors and equipment checked, pre-op evaluation and timeout performed  Peripheral Block  Patient position: supine  Prep: ChloraPrep  Patient monitoring: heart rate, cardiac monitor, continuous pulse ox, continuous capnometry and frequent blood pressure checks  Block type: popliteal  Laterality: right  Injection technique: single shot  Needle  Needle type: Stimuplex   Needle gauge: 21 G  Needle length: 4 in  Needle localization: anatomical landmarks and ultrasound guidance   -ultrasound image captured on disc.  Assessment  Injection assessment: negative aspiration, negative parasthesia and local visualized surrounding nerve  Paresthesia pain: none  Heart rate change: no  Slow fractionated injection: yes    Medications:    Medications: BUPivacaine liposome (PF) 1.3 % (13.3 mg/mL) suspension - Injection   20 mL - 11/11/2024 7:05:00 AM  bupivacaine (pf) (MARCAINE) injection 0.5% - Perineural   10 mL - 11/11/2024 7:05:00 AM    Additional Notes  VSS.  [facility] RN monitoring vitals throughout procedure.  Patient tolerated procedure well.

## 2024-11-11 NOTE — PROGRESS NOTES
Right popliteal single shot block complete per  with Anesthesia. Exparel band placed to pt's wrist. Pt tolerated well. See Anesthesia record for procedural notes. See flowsheet and MAR for vitals and medications. Monitors in place, alarms audible. VSS. etCO2 monitoring in place. Resp even, unlabored. Skin warm, dry. Pt in NAD. Pt awaiting transport to OR. Family at bedside. Bed in lowest, locked position. Side rails up x2. Call light within reach.

## 2024-11-11 NOTE — TRANSFER OF CARE
"Anesthesia Transfer of Care Note    Patient: Chad Chen    Procedure(s) Performed: Procedure(s) (LRB):  REPAIR, TENDON, ACHILLES (Right)  EXCISION,JULIA DEFORMITY,CALCANEUS (Right)  RECESSION, MUSCLE, GASTROCNEMIUS OPEN (Right)    Patient location: PACU    Anesthesia Type: general    Transport from OR: Transported from OR on room air with adequate spontaneous ventilation    Post pain: adequate analgesia    Post assessment: no apparent anesthetic complications and tolerated procedure well    Post vital signs: stable    Level of consciousness: lethargic and responds to stimulation    Nausea/Vomiting: no nausea/vomiting    Complications: none    Transfer of care protocol was followed      Last vitals: Visit Vitals  BP (!) 144/83 (BP Location: Right forearm, Patient Position: Lying)   Pulse (!) 59   Temp 36.7 °C (98 °F)   Resp 16   Ht 5' 11" (1.803 m)   Wt 116.1 kg (256 lb)   SpO2 97%   BMI 35.70 kg/m²     "

## 2024-11-11 NOTE — ANESTHESIA PROCEDURE NOTES
Intubation    Date/Time: 11/11/2024 7:21 AM    Performed by: Drea Murguia CRNA  Authorized by: Alex Johnson MD    Intubation:     Induction:  Intravenous    Intubated:  Postinduction    Mask Ventilation:  Easy with oral airway    Attempts:  1    Attempted By:  WILDA    Blade:  Ford 3    Laryngeal View Grade: Grade I - full view of cords      Difficult Airway Encountered?: No      Complications:  None    Airway Device:  Oral endotracheal tube    Airway Device Size:  8.0    Style/Cuff Inflation:  Cuffed    Inflation Amount (mL):  4    Tube secured:  22    Secured at:  The lips    Placement Verified By:  Capnometry    Complicating Factors:  Anterior larynx and short neck    Findings Post-Intubation:  BS equal bilateral

## 2024-11-11 NOTE — DISCHARGE SUMMARY
[facility] - Surgery  Discharge Note  Short Stay    Procedure(s) (LRB):  REPAIR, TENDON, ACHILLES (Right)  EXCISION,JULIA DEFORMITY,CALCANEUS (Right)  RECESSION, MUSCLE, GASTROCNEMIUS OPEN (Right)      OUTCOME: Patient tolerated treatment/procedure well without complication and is now ready for discharge.    DISPOSITION: Home or Self Care    FINAL DIAGNOSIS:  Right insertional achilles tendinopathy.    FOLLOWUP: In clinic    DISCHARGE INSTRUCTIONS:  No discharge procedures on file.     TIME SPENT ON DISCHARGE: 15 minutes

## 2024-11-12 VITALS
OXYGEN SATURATION: 96 % | WEIGHT: 256 LBS | SYSTOLIC BLOOD PRESSURE: 158 MMHG | BODY MASS INDEX: 35.84 KG/M2 | RESPIRATION RATE: 16 BRPM | HEART RATE: 67 BPM | HEIGHT: 71 IN | DIASTOLIC BLOOD PRESSURE: 93 MMHG | TEMPERATURE: 98 F

## 2024-11-18 ENCOUNTER — PATIENT MESSAGE (OUTPATIENT)
Dept: ORTHOPEDICS | Facility: CLINIC | Age: 44
End: 2024-11-18
Payer: COMMERCIAL

## 2024-11-18 DIAGNOSIS — M76.60 INSERTIONAL ACHILLES TENDINOPATHY: Primary | ICD-10-CM

## 2024-11-18 RX ORDER — OXYCODONE AND ACETAMINOPHEN 5; 325 MG/1; MG/1
1 TABLET ORAL
Qty: 42 TABLET | Refills: 0 | Status: SHIPPED | OUTPATIENT
Start: 2024-11-18 | End: 2024-11-25

## 2024-11-25 DIAGNOSIS — G56.01 CARPAL TUNNEL SYNDROME OF RIGHT WRIST: Primary | ICD-10-CM

## 2024-11-25 RX ORDER — CEFAZOLIN SODIUM 2 G/50ML
2 SOLUTION INTRAVENOUS
OUTPATIENT
Start: 2024-11-25

## 2024-11-26 ENCOUNTER — OFFICE VISIT (OUTPATIENT)
Dept: ORTHOPEDICS | Facility: CLINIC | Age: 44
End: 2024-11-26
Payer: COMMERCIAL

## 2024-11-26 ENCOUNTER — PATIENT MESSAGE (OUTPATIENT)
Dept: ORTHOPEDICS | Facility: CLINIC | Age: 44
End: 2024-11-26

## 2024-11-26 VITALS — WEIGHT: 255.94 LBS | HEIGHT: 71 IN | BODY MASS INDEX: 35.83 KG/M2

## 2024-11-26 DIAGNOSIS — Q79.8 CONGENITAL CONTRACTURE OF RIGHT GASTROCNEMIUS MUSCLE: ICD-10-CM

## 2024-11-26 DIAGNOSIS — M76.60 INSERTIONAL ACHILLES TENDINOPATHY: Primary | ICD-10-CM

## 2024-11-26 PROCEDURE — 4010F ACE/ARB THERAPY RXD/TAKEN: CPT | Mod: CPTII,S$GLB,, | Performed by: ORTHOPAEDIC SURGERY

## 2024-11-26 PROCEDURE — 99024 POSTOP FOLLOW-UP VISIT: CPT | Mod: S$GLB,,, | Performed by: ORTHOPAEDIC SURGERY

## 2024-11-26 PROCEDURE — 99999 PR PBB SHADOW E&M-EST. PATIENT-LVL III: CPT | Mod: PBBFAC,,, | Performed by: ORTHOPAEDIC SURGERY

## 2024-11-26 PROCEDURE — 3044F HG A1C LEVEL LT 7.0%: CPT | Mod: CPTII,S$GLB,, | Performed by: ORTHOPAEDIC SURGERY

## 2024-11-26 PROCEDURE — 1159F MED LIST DOCD IN RCRD: CPT | Mod: CPTII,S$GLB,, | Performed by: ORTHOPAEDIC SURGERY

## 2024-11-26 RX ORDER — OXYCODONE AND ACETAMINOPHEN 5; 325 MG/1; MG/1
1 TABLET ORAL EVERY 6 HOURS PRN
Qty: 28 TABLET | Refills: 0 | Status: SHIPPED | OUTPATIENT
Start: 2024-11-26 | End: 2024-12-03

## 2024-11-28 NOTE — PROGRESS NOTES
Status/Diagnosis: Right gastroc contracture and AICT.  Date of Surgery:   11/11/2024: Right achilles tendon debridement; Maurisio resection; Elenita.  Date of Injury: none  Return visit: 1 week for wound check  X-rays on Return: none    Chief Complaint:   Chief Complaint   Patient presents with    Right Ankle - Pain, Post-op Evaluation     Present History:  Chad Chen is a 44 y.o. male who presents today via referral from Dr. Alex Thao.  Patient endorses an approximately 4 month history worsening right posterior heel pain.  Noticed a bump over the area of concern more recently.  Denies any history of injury or other inciting event.  Patient has minimal pain at rest, increased with weight-bearing and also pain due to irritation from mass effect.  Denies any numbness or tingling.  Recently seen by my partner, Dr. Thao, for evaluation and treatment.  Discussed the use of a heel lift, anti-inflammatories, and physical therapy.  Patient reports that he called to make his 1st appointment with PT however we will not be able to be seen for the next 3 weeks.  Was taking over-the-counter oral ibuprofen as needed for pain intermittently but with minimal relief.  Past medical history significant for hypertension and asthma.  Denies tobacco use.  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    POSTOP:  Patient returns today for routine postop visit.  Overall doing well.  5/10 pain.  All complaints are related to irritation and pressure from the splint placed at time of surgery.  Did have one fall off of his knee scooter since time of surgery.  Denies any drainage, fever, chills.    Past Medical History:   Diagnosis Date    Asthma     exercise induced    Cardiac murmur     Dyslipidemia     Hypertension     Obesity     PONV (postoperative nausea and vomiting)     Seasonal and perennial allergic rhinitis        Past Surgical History:   Procedure Laterality Date    ADENOIDECTOMY  2004    CARPAL TUNNEL RELEASE Left 12/17/2020    Procedure:  RELEASE, CARPAL TUNNEL;  Surgeon: Dre Montanez MD;  Location: [facility] OR;  Service: Orthopedics;  Laterality: Left;  Procedure:  Left carpal tunnel release    Position:  Supine    Anesthesia:  General equipment:  Carpal tunnel Set    EXCISION,JULIA DEFORMITY,CALCANEUS Right 11/11/2024    Procedure: EXCISION,JULIA DEFORMITY,CALCANEUS;  Surgeon: Lew Helton MD;  Location: [facility] OR;  Service: Orthopedics;  Laterality: Right;  make card please    LAPAROSCOPIC CHOLECYSTECTOMY  2010    laparoscopic removal gallstones    RECESSION, MUSCLE, GASTROCNEMIUS Right 11/11/2024    Procedure: RECESSION, MUSCLE, GASTROCNEMIUS OPEN;  Surgeon: Lew Helton MD;  Location: [facility] OR;  Service: Orthopedics;  Laterality: Right;  Make card please    REPAIR OF ACHILLES TENDON Right 11/11/2024    Procedure: REPAIR, TENDON, ACHILLES;  Surgeon: Lew Helton MD;  Location: [facility] OR;  Service: Orthopedics;  Laterality: Right;    TONSILLECTOMY  2004    corrective septoplasty same time.    TRIGGER FINGER RELEASE Right 3/31/2022    Procedure: Right middle finger trigger release;  Surgeon: Dre Montanez MD;  Location: [facility] OR;  Service: Orthopedics;  Laterality: Right;       Current Outpatient Medications   Medication Sig    amLODIPine (NORVASC) 5 MG tablet Take 1 tablet (5 mg total) by mouth once daily.    budesonide-formoterol 160-4.5 mcg (SYMBICORT) 160-4.5 mcg/actuation HFAA Inhale 2 puffs into the lungs every 12 (twelve) hours. Controller    levocetirizine (XYZAL) 5 MG tablet Take 5 mg by mouth every evening.    oxyCODONE-acetaminophen (PERCOCET) 5-325 mg per tablet Take 1 tablet by mouth every 6 (six) hours as needed for Pain.    potassium chloride (KLOR-CON) 10 MEQ TbSR Take 1 tablet (10 mEq total) by mouth 2 (two) times daily.    PROAIR RESPICLICK 90 mcg/actuation inhaler Inhale 2 puffs into the lungs every 6 (six) hours as needed for Wheezing or Shortness of Breath.    sildenafiL (VIAGRA) 50 MG tablet Take 1  tablet by mouth daily as needed for erectile dysfunction.    sumatriptan (IMITREX) 50 MG tablet Take 1 tablet (50 mg total) by mouth daily as needed for Migraine.    valsartan (DIOVAN) 320 MG tablet Take 1 tablet (320 mg total) by mouth once daily.     No current facility-administered medications for this visit.       Review of patient's allergies indicates:   Allergen Reactions    Aspirin     Inderal [propranolol] Hives    Lisinopril Other (See Comments)     cough    Nsaids (non-steroidal anti-inflammatory drug)      Tolerates celecoxib/celebrex       Family History   Problem Relation Name Age of Onset    Cancer Mother          triple breast cancer at 2 different times.     Breast cancer Mother      Heart disease Father          2V CABG at 45; 12 coronary stents.    Hyperlipidemia Father      Mental illness Father          MIKALA    Stroke Father          traumatic CVA    Vision loss Father          very poor vision    Cancer Maternal Grandfather           from melanoma at his 60's.    Early death Maternal Grandfather           early 60's of melanoma    Melanoma Maternal Grandfather      Diabetes Paternal Grandmother      Hypertension Paternal Grandfather      Diabetes Maternal Uncle      Hypertension Paternal Uncle         Social History     Socioeconomic History    Marital status:    Occupational History    Occupation:  for [employer].    Tobacco Use    Smoking status: Never    Smokeless tobacco: Never   Substance and Sexual Activity    Alcohol use: Not Currently     Comment: no alcohol for 8 months as of 3/31/22    Drug use: Yes     Types: Marijuana     Comment: Consumes     Social Drivers of Health     Financial Resource Strain: Low Risk  (2024)    Overall Financial Resource Strain (CARDIA)     Difficulty of Paying Living Expenses: Not hard at all   Food Insecurity: No Food Insecurity (2024)    Hunger Vital Sign     Worried About Running Out of Food in the Last Year:  Never true     Ran Out of Food in the Last Year: Never true   Transportation Needs: No Transportation Needs (2/20/2024)    PRAPARE - Transportation     Lack of Transportation (Medical): No     Lack of Transportation (Non-Medical): No   Physical Activity: Insufficiently Active (2/20/2024)    Exercise Vital Sign     Days of Exercise per Week: 3 days     Minutes of Exercise per Session: 40 min   Stress: Stress Concern Present (2/20/2024)    [employer] [place] [employer] - Occupational Stress Questionnaire     Feeling of Stress : To some extent   Housing Stability: Low Risk  (2/20/2024)    Housing Stability Vital Sign     Unable to Pay for Housing in the Last Year: No     Number of Places Lived in the Last Year: 1     Unstable Housing in the Last Year: No       Physical exam:  There were no vitals filed for this visit.  Body mass index is 35.7 kg/m².  General: In no apparent distress; well developed and well nourished.  HEENT: normocephalic; atraumatic.  Cardiovascular: regular rate.  Respiratory: no increased work of breathing.  Musculoskeletal:   Gait: unable to assess  Inspection:   Surgical sites healing well with nylon sutures in place.  Minimal residual swelling.  No warmth or erythema.  No signs or symptoms of infection.  Good tension with Ramirez testing.  Symmetric resting ankle plantar flexion with the patient prone and knees flexed 90°.  Gross motor and sensory function intact.  Palpable pedal pulse.                   Imaging Studies/Outside documentation:  I have ordered/reviewed/interpreted the following images/outside documentation:  No new imaging today.        Assessment:  Chad Chen is a 44 y.o. male with Right gastroc contracture and AICT.     Plan:   Every other suture removed today and Steri-Strips placed.  Patient to be placed back into a new well-padded short-leg fiberglass cast in plantar flexion.    Patient with continued concern regarding irritation from cast, cast padding, etc.   We  will see him back in 1 week's time at which point we will likely transition out of the cast and into a boot with heel wedges even though he was still to remain nonweightbearing x 5 weeks from time of surgery.      As noted previously, patient unable take oral aspirin for DVT prophylaxis.    Patient voiced understanding.  All questions were answered    This note was created using voice recognition software and may contain grammatical errors.

## 2024-12-03 ENCOUNTER — PATIENT MESSAGE (OUTPATIENT)
Dept: ORTHOPEDICS | Facility: CLINIC | Age: 44
End: 2024-12-03

## 2024-12-03 ENCOUNTER — OFFICE VISIT (OUTPATIENT)
Dept: ORTHOPEDICS | Facility: CLINIC | Age: 44
End: 2024-12-03
Payer: COMMERCIAL

## 2024-12-03 VITALS — HEIGHT: 71 IN | WEIGHT: 255.94 LBS | BODY MASS INDEX: 35.83 KG/M2

## 2024-12-03 DIAGNOSIS — M76.60 INSERTIONAL ACHILLES TENDINOPATHY: Primary | ICD-10-CM

## 2024-12-03 PROCEDURE — 4010F ACE/ARB THERAPY RXD/TAKEN: CPT | Mod: CPTII,S$GLB,, | Performed by: ORTHOPAEDIC SURGERY

## 2024-12-03 PROCEDURE — 3044F HG A1C LEVEL LT 7.0%: CPT | Mod: CPTII,S$GLB,, | Performed by: ORTHOPAEDIC SURGERY

## 2024-12-03 PROCEDURE — 1160F RVW MEDS BY RX/DR IN RCRD: CPT | Mod: CPTII,S$GLB,, | Performed by: ORTHOPAEDIC SURGERY

## 2024-12-03 PROCEDURE — 1159F MED LIST DOCD IN RCRD: CPT | Mod: CPTII,S$GLB,, | Performed by: ORTHOPAEDIC SURGERY

## 2024-12-03 PROCEDURE — 99024 POSTOP FOLLOW-UP VISIT: CPT | Mod: S$GLB,,, | Performed by: ORTHOPAEDIC SURGERY

## 2024-12-03 PROCEDURE — 99999 PR PBB SHADOW E&M-EST. PATIENT-LVL III: CPT | Mod: PBBFAC,,, | Performed by: ORTHOPAEDIC SURGERY

## 2024-12-04 ENCOUNTER — OFFICE VISIT (OUTPATIENT)
Dept: ORTHOPEDICS | Facility: CLINIC | Age: 44
End: 2024-12-04
Payer: COMMERCIAL

## 2024-12-04 VITALS — BODY MASS INDEX: 35.83 KG/M2 | WEIGHT: 255.94 LBS | HEIGHT: 71 IN

## 2024-12-04 DIAGNOSIS — G56.01 CARPAL TUNNEL SYNDROME OF RIGHT WRIST: Primary | ICD-10-CM

## 2024-12-04 PROCEDURE — 3008F BODY MASS INDEX DOCD: CPT | Mod: CPTII,S$GLB,, | Performed by: ORTHOPAEDIC SURGERY

## 2024-12-04 PROCEDURE — 99999 PR PBB SHADOW E&M-EST. PATIENT-LVL III: CPT | Mod: PBBFAC,,, | Performed by: ORTHOPAEDIC SURGERY

## 2024-12-04 PROCEDURE — 3044F HG A1C LEVEL LT 7.0%: CPT | Mod: CPTII,S$GLB,, | Performed by: ORTHOPAEDIC SURGERY

## 2024-12-04 PROCEDURE — 4010F ACE/ARB THERAPY RXD/TAKEN: CPT | Mod: CPTII,S$GLB,, | Performed by: ORTHOPAEDIC SURGERY

## 2024-12-04 PROCEDURE — 99213 OFFICE O/P EST LOW 20 MIN: CPT | Mod: S$GLB,,, | Performed by: ORTHOPAEDIC SURGERY

## 2024-12-04 PROCEDURE — 1159F MED LIST DOCD IN RCRD: CPT | Mod: CPTII,S$GLB,, | Performed by: ORTHOPAEDIC SURGERY

## 2024-12-04 NOTE — PATIENT INSTRUCTIONS
Surgery Instructions:     Your surgery is scheduled on 1/16/2025 at the surgery center: [address], 1st floor, second entrance.    The pre-op department will be in contact with you prior to your procedure to review medications and instructions.       Nothing to eat or drink after midnight prior to day of surgery.    You should STOP taking any blood thinners 5 days prior to surgery.     The surgery center will contact you the day prior to surgery to advise you of your arrival time for surgery.     Your post op appointment is scheduled on 1/31/2025 at 11:20.

## 2024-12-10 ENCOUNTER — PATIENT MESSAGE (OUTPATIENT)
Dept: ORTHOPEDICS | Facility: CLINIC | Age: 44
End: 2024-12-10
Payer: COMMERCIAL

## 2024-12-13 NOTE — PROGRESS NOTES
Mr Chen  returns to clinic today.  Has a history of right carpal tunnel syndrome.  He is here today discuss carpal tunnel release but is getting ready to undergo a surgery for his foot.  We will therefore discuss the timing of surgery     Physical exam: Examination of the right hand reveals that there is no edema.  There are no skin changes.  Palpation produces no tenderness.  Has grossly intact sensation in the median, radial, and ulnar distribution.  Has positive Tinel's and positive Durkan's test.  Has 2+ radial pulse    EMG nerve conduction study has been reviewed.  It was a normal study     Assessment: Right carpal tunnel syndrome     Plan:      I have discussed the possibility of right carpal tunnel release.  We would like him to recover from his foot surgery prior to considering that procedure.    2.  Will follow up with me as needed

## 2024-12-17 ENCOUNTER — OFFICE VISIT (OUTPATIENT)
Dept: ORTHOPEDICS | Facility: CLINIC | Age: 44
End: 2024-12-17
Payer: COMMERCIAL

## 2024-12-17 DIAGNOSIS — M76.60 INSERTIONAL ACHILLES TENDINOPATHY: Primary | ICD-10-CM

## 2024-12-17 DIAGNOSIS — Q79.8 CONGENITAL CONTRACTURE OF RIGHT GASTROCNEMIUS MUSCLE: ICD-10-CM

## 2024-12-17 PROCEDURE — 99024 POSTOP FOLLOW-UP VISIT: CPT | Mod: S$GLB,,, | Performed by: ORTHOPAEDIC SURGERY

## 2024-12-17 PROCEDURE — 99999 PR PBB SHADOW E&M-EST. PATIENT-LVL I: CPT | Mod: PBBFAC,,, | Performed by: ORTHOPAEDIC SURGERY

## 2024-12-17 PROCEDURE — 4010F ACE/ARB THERAPY RXD/TAKEN: CPT | Mod: CPTII,S$GLB,, | Performed by: ORTHOPAEDIC SURGERY

## 2024-12-17 PROCEDURE — 1159F MED LIST DOCD IN RCRD: CPT | Mod: CPTII,S$GLB,, | Performed by: ORTHOPAEDIC SURGERY

## 2024-12-17 PROCEDURE — 1160F RVW MEDS BY RX/DR IN RCRD: CPT | Mod: CPTII,S$GLB,, | Performed by: ORTHOPAEDIC SURGERY

## 2024-12-17 PROCEDURE — 3044F HG A1C LEVEL LT 7.0%: CPT | Mod: CPTII,S$GLB,, | Performed by: ORTHOPAEDIC SURGERY

## 2024-12-17 NOTE — PROGRESS NOTES
Status/Diagnosis: Right gastroc contracture and AICT.  Date of Surgery:   11/11/2024: Right achilles tendon debridement; Maurisio resection; Elenita.  Date of Injury: none  Return visit: 1 month  X-rays on Return: none      Present History:  Chad Chen is a 44 y.o. male who presents today via referral from Dr. Alex Thao.  Patient endorses an approximately 4 month history worsening right posterior heel pain.  Noticed a bump over the area of concern more recently.  Denies any history of injury or other inciting event.  Patient has minimal pain at rest, increased with weight-bearing and also pain due to irritation from mass effect.  Denies any numbness or tingling.  Recently seen by my partner, Dr. Thao, for evaluation and treatment.  Discussed the use of a heel lift, anti-inflammatories, and physical therapy.  Patient reports that he called to make his 1st appointment with PT however we will not be able to be seen for the next 3 weeks.  Was taking over-the-counter oral ibuprofen as needed for pain intermittently but with minimal relief.  Past medical history significant for hypertension and asthma.  Denies tobacco use.  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    POSTOP:  Patient returns today for routine postop visit.  2/10 pain.  Wearing boot as instructed.  Some irritation with boot wear otherwise doing well.      Past Medical History:   Diagnosis Date    Asthma     exercise induced    Cardiac murmur     Dyslipidemia     Hypertension     Obesity     PONV (postoperative nausea and vomiting)     Seasonal and perennial allergic rhinitis        Past Surgical History:   Procedure Laterality Date    ADENOIDECTOMY  2004    CARPAL TUNNEL RELEASE Left 12/17/2020    Procedure: RELEASE, CARPAL TUNNEL;  Surgeon: Dre Montanez MD;  Location: [facility];  Service: Orthopedics;  Laterality: Left;  Procedure:  Left carpal tunnel release    Position:  Supine    Anesthesia:  General equipment:  Carpal tunnel Set    EXCISION,MAURISIO  DEFORMITY,CALCANEUS Right 11/11/2024    Procedure: EXCISION,JULIA DEFORMITY,CALCANEUS;  Surgeon: Lew Helton MD;  Location: [facility] OR;  Service: Orthopedics;  Laterality: Right;  make card please    LAPAROSCOPIC CHOLECYSTECTOMY  2010    laparoscopic removal gallstones    RECESSION, MUSCLE, GASTROCNEMIUS Right 11/11/2024    Procedure: RECESSION, MUSCLE, GASTROCNEMIUS OPEN;  Surgeon: Lew Helton MD;  Location: [facility] OR;  Service: Orthopedics;  Laterality: Right;  Make card please    REPAIR OF ACHILLES TENDON Right 11/11/2024    Procedure: REPAIR, TENDON, ACHILLES;  Surgeon: Lew Helton MD;  Location: [facility] OR;  Service: Orthopedics;  Laterality: Right;    TONSILLECTOMY  2004    corrective septoplasty same time.    TRIGGER FINGER RELEASE Right 3/31/2022    Procedure: Right middle finger trigger release;  Surgeon: Dre Montanez MD;  Location: [facility] OR;  Service: Orthopedics;  Laterality: Right;       Current Outpatient Medications   Medication Sig    amLODIPine (NORVASC) 5 MG tablet Take 1 tablet (5 mg total) by mouth once daily.    budesonide-formoterol 160-4.5 mcg (SYMBICORT) 160-4.5 mcg/actuation HFAA Inhale 2 puffs into the lungs every 12 (twelve) hours. Controller    levocetirizine (XYZAL) 5 MG tablet Take 5 mg by mouth every evening.    potassium chloride (KLOR-CON) 10 MEQ TbSR Take 1 tablet (10 mEq total) by mouth 2 (two) times daily.    PROAIR RESPICLICK 90 mcg/actuation inhaler Inhale 2 puffs into the lungs every 6 (six) hours as needed for Wheezing or Shortness of Breath.    sildenafiL (VIAGRA) 50 MG tablet Take 1 tablet by mouth daily as needed for erectile dysfunction.    sumatriptan (IMITREX) 50 MG tablet Take 1 tablet (50 mg total) by mouth daily as needed for Migraine.    valsartan (DIOVAN) 320 MG tablet Take 1 tablet (320 mg total) by mouth once daily.     No current facility-administered medications for this visit.       Review of patient's allergies indicates:   Allergen  Reactions    Aspirin     Inderal [propranolol] Hives    Lisinopril Other (See Comments)     cough    Nsaids (non-steroidal anti-inflammatory drug)      Tolerates celecoxib/celebrex       Family History   Problem Relation Name Age of Onset    Cancer Mother          triple breast cancer at 2 different times.     Breast cancer Mother      Heart disease Father          2V CABG at 45; 12 coronary stents.    Hyperlipidemia Father      Mental illness Father          MIKALA    Stroke Father          traumatic CVA    Vision loss Father          very poor vision    Cancer Maternal Grandfather           from melanoma at his 60's.    Early death Maternal Grandfather           early 60's of melanoma    Melanoma Maternal Grandfather      Diabetes Paternal Grandmother      Hypertension Paternal Grandfather      Diabetes Maternal Uncle      Hypertension Paternal Uncle         Social History     Socioeconomic History    Marital status:    Occupational History    Occupation:  for [employer].    Tobacco Use    Smoking status: Never    Smokeless tobacco: Never   Substance and Sexual Activity    Alcohol use: Not Currently     Comment: no alcohol for 8 months as of 3/31/22    Drug use: Yes     Types: Marijuana     Comment: Consumes     Social Drivers of Health     Financial Resource Strain: Low Risk  (2024)    Overall Financial Resource Strain (CARDIA)     Difficulty of Paying Living Expenses: Not hard at all   Food Insecurity: No Food Insecurity (2024)    Hunger Vital Sign     Worried About Running Out of Food in the Last Year: Never true     Ran Out of Food in the Last Year: Never true   Transportation Needs: No Transportation Needs (2024)    PRAPARE - Transportation     Lack of Transportation (Medical): No     Lack of Transportation (Non-Medical): No   Physical Activity: Insufficiently Active (2024)    Exercise Vital Sign     Days of Exercise per Week: 3 days     Minutes of Exercise  per Session: 40 min   Stress: Stress Concern Present (2/20/2024)    [employer] [place] [employer] - Occupational Stress Questionnaire     Feeling of Stress : To some extent   Housing Stability: Low Risk  (2/20/2024)    Housing Stability Vital Sign     Unable to Pay for Housing in the Last Year: No     Number of Places Lived in the Last Year: 1     Unstable Housing in the Last Year: No       Physical exam:  There were no vitals filed for this visit.  There is no height or weight on file to calculate BMI.  General: In no apparent distress; well developed and well nourished.  HEENT: normocephalic; atraumatic.  Cardiovascular: regular rate.  Respiratory: no increased work of breathing.  Musculoskeletal:   Gait: unable to assess  Inspection:   Surgical sites with well-healed scars.  Little to no residual swelling No warmth or erythema.  No signs or symptoms of infection.  Good tension with Ramirez testing.  Symmetric resting ankle plantar flexion with the patient prone and knees flexed 90°.  Gross motor and sensory function intact.  Palpable pedal pulse.                   Imaging Studies/Outside documentation:  I have ordered/reviewed/interpreted the following images/outside documentation:  No new imaging today.        Assessment:  Chad Chen is a 44 y.o. male with Right gastroc contracture and AICT.     Plan:   Patient doing well postop.  We will initiate physical therapy per protocol.  One scanned into the chart and the other provided with the patient.  Still to wear boot to sleep.  Return to clinic in 1 month for repeat evaluation.  Patient voiced understanding.  All questions were answered.     As noted previously, patient unable take oral aspirin for DVT prophylaxis.      This note was created using voice recognition software and may contain grammatical errors.

## 2024-12-18 ENCOUNTER — CLINICAL SUPPORT (OUTPATIENT)
Dept: REHABILITATION | Facility: HOSPITAL | Age: 44
End: 2024-12-18
Attending: ORTHOPAEDIC SURGERY
Payer: COMMERCIAL

## 2024-12-18 DIAGNOSIS — M76.60 INSERTIONAL ACHILLES TENDINOPATHY: ICD-10-CM

## 2024-12-18 DIAGNOSIS — Q79.8 CONGENITAL CONTRACTURE OF RIGHT GASTROCNEMIUS MUSCLE: ICD-10-CM

## 2024-12-18 DIAGNOSIS — M25.671 DECREASED RANGE OF MOTION OF RIGHT ANKLE: Primary | ICD-10-CM

## 2024-12-18 DIAGNOSIS — R29.898 ANKLE WEAKNESS: ICD-10-CM

## 2024-12-18 DIAGNOSIS — R26.89 IMPAIRED GAIT AND MOBILITY: ICD-10-CM

## 2024-12-18 PROCEDURE — 97110 THERAPEUTIC EXERCISES: CPT | Mod: PN

## 2024-12-18 PROCEDURE — 97161 PT EVAL LOW COMPLEX 20 MIN: CPT | Mod: PN

## 2024-12-18 NOTE — PLAN OF CARE
[facility]  Physical Therapy Initial Evaluation    Name: Chad Chen  Clinic Number: [id number]    Therapy Diagnosis:   Encounter Diagnoses   Name Primary?    Insertional Achilles tendinopathy     Congenital contracture of right gastrocnemius muscle     Decreased range of motion of right ankle Yes    Ankle weakness     Impaired gait and mobility      Physician: Lew Helton MD    Physician Orders: PT Eval and Treat   Medical Diagnosis from Referral:   M76.60 (ICD-10-CM) - Insertional Achilles tendinopathy   Q79.8 (ICD-10-CM) - Congenital contracture of right gastrocnemius muscle   S/p Right Achilles Tendon Repair  Evaluation Date: 12/18/2024  Authorization Period Expiration: 12/17/2025  Plan of Care Expiration: 3/28/2025 or 20v post eval  Visit # (episodes) / Visits authorized: 1/ eval   (POC 0/20)   2nd FOTO visit 5  3rd FOTO visit 10  Progress Note Due: 1/18/2025  PTA: 0/5      Time In: 1100  Time Out: 1150  Total Billable Time: 50 minutes    Precautions: Asthma; HTN; Weight Bearing restrictions (see protocol)  Insurance: Payor: [employer] / Plan: BCBS ALL OUT OF STATE / Product Type: PPO /     Subjective   Date of onset: s/p 11/11/2024  History of current condition - Chad reports: no complications with surgery except Difficult to come out of surgery. Reports has fallen on floor on his side not his leg multiple times. Did fall on his leg twice. Reports surgeon knows and has seen him since falling on the leg (more than 3 weeks ago). Last visit with surgeon was 12/17. Reviewed restrictions of Weight Bearing status and aircast use during sleep but can remove for bathing and dressing. Starting 50% Weight Bearing today per protocol status. Has not had previous procedures on Right ankle/foot. Has had surgery on left foot. January 16th, carpal tunnel surgery. Has an Audio processing disorder so needs to read lips. Next FU with surgeon is 1/14/25.     Medical History:   Past  Medical History:   Diagnosis Date    Asthma     exercise induced    Cardiac murmur     Dyslipidemia     Hypertension     Obesity     PONV (postoperative nausea and vomiting)     Seasonal and perennial allergic rhinitis        Surgical History:   Chad Chen  has a past surgical history that includes Laparoscopic cholecystectomy (2010); Adenoidectomy (2004); Tonsillectomy (2004); Carpal tunnel release (Left, 12/17/2020); Trigger finger release (Right, 3/31/2022); Repair of Achilles tendon (Right, 11/11/2024); excision,michele deformity,calcaneus (Right, 11/11/2024); and recession, muscle, gastrocnemius (Right, 11/11/2024).    Medications:   Chad has a current medication list which includes the following prescription(s): amlodipine, budesonide-formoterol 160-4.5 mcg, levocetirizine, potassium chloride, proair respiclick, sildenafil, sumatriptan, and valsartan.    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  Prior Level of Function: independent ambulation  Current Level of Function: difficult/pain with walking; prolong standing activities; at night     Pain:  Current 4/10, worst 8/10, best 2/10   Location: Right achilles; above heel  Description: Stab  Aggravating Factors: walking; prolong standing activities; at night   Easing Factors: no pn med in 3 days; just tylenol; elevating on and off; ice    Pts goals: walking without arm assistance; Return to 5 K; short marathon    Objective     Posture Observation: Arches / GV: high BMI; currently using scooter and bi-ax crutches  Palpation: Incision heeling  wheel; incisions intact; thin skin and red on calcaneous    Range of Motion/Strength:      Ankle ROM: Left Right   Ankle Dorsiflexion (20 norm) 18°  10°   Ankle Plantarflexion   (50 norm) 52° 45°     Ankle Inversion (35 norm) 24° 20°   Ankle Eversion (25 norm) 28° 20°       Ankle MMT: 5/5 norm Left Right   Ankle Dorsiflexion 5/5 NT   Ankle Plantarflexion 4/5 NT   Ankle Inversion 5/5 NT   Ankle Eversion 5/5 NT   Knee MMT: NT  Hip MMT: NT    Girth Measurements Left Right   Figure 8 56.5 cm 57 cm         Evaluation   Single Limb Stance R LE TBD seconds  (<10 sec = HIGH FALL RISK)   Single Limb Stance L LE TBD seconds  (<10 sec = HIGH FALL RISK)       Gait With AD.  Device Used -  Scooter   Analysis NT       See FOTO score in Media section of EPIC      TREATMENT   Treatment Time In: 1130  Treatment Time Out: 1150  Total Treatment time separate from Evaluation: 20 minutes    Chad received therapeutic exercises to develop strength, ROM, and flexibility for 15 minutes including:  NMRE utilized to activate appropriate mm to improve balance, proprioception, stability and gait skills: 5 minutes - 50% Weight Bearing status    12/18/2024 @5.5 weeks  DOS:11/11/2024  Procedure(s) (LRB):  REPAIR, TENDON, ACHILLES (Right)  EXCISION,JULIA DEFORMITY,CALCANEUS (Right)  RECESSION, MUSCLE, GASTROCNEMIUS OPEN (Right)    CHANGE VISITS TO 1XWEEK UNTIL 1/8/2025    SEATED:  Long Arc Quad, 2x15 Bilateral   Straight Leg Raise 4 ways, 2x15 Bilateral     STAND:  Train Weight Bearing status in the bars, 50% on scale  [place], Right only, standing left only      ADD NEXT: Note Right wrist pain/ follow protocol below  Hamstring curls   Edema Massage  Modalities as needed for pn mgt         Media Information    File Link        Home Exercises and Patient Education Provided    Education provided:   - daily HEP  - ice with increased pain or edema    Written Home Exercises Provided: yes.  Exercises were reviewed and Chad was able to demonstrate them  prior to the end of the session.  Chad demonstrated good  understanding of the education provided.     See EMR under Patient Instructions for exercises provided 12/18/2024.    Assessment   Chad is a 44 y.o. male referred to outpatient Physical Therapy with a medical diagnosis of s/p achilles tendon repair, michele's excision, gastroc recession. Pt presents with pain in Right achilles region, decreased Right ankle Range of Motion and strength, gait and balance deficits, and impaired function.     Pt prognosis is Excellent.   Pt will benefit from skilled outpatient Physical Therapy to address the deficits stated above and in the chart below, provide pt/family education, and to maximize pt's level of independence.     Plan of care discussed with patient: Yes  Pt's spiritual, cultural and educational needs considered and patient is agreeable to the plan of care and goals as stated below:     Anticipated Barriers for therapy: none      Medical Necessity is demonstrated by the following  History  Co-morbidities and personal factors that may impact the plan of care [] LOW: no personal factors / co-morbidities  [x] MODERATE: 1-2 personal factors / co-morbidities  [] HIGH: 3+ personal factors / co-morbidities    Moderate / High Support Documentation:   Co-morbidities affecting plan of care: see precautions    Personal Factors:   coping style  attitudes     Examination  Body Structures and Functions, activity limitations and participation restrictions that may impact the plan of care [] LOW: addressing 1-2 elements  [x] MODERATE: 3+ elements  [] HIGH: 4+ elements (please support below)    Moderate / High Support Documentation: Range of Motion, strength, gait     Clinical Presentation [] LOW: stable  [] MODERATE: Evolving  [] HIGH: Unstable     Decision Making/ Complexity Score: low          Goals:  Short Term Goals: 5 weeks   1. Pt will demonstrate compliance with HEP.  2. Pt will decrease swelling of ankle by .5 cm to reduce  pain performing daily life functions.  3. Pt will increase DF ROM by 10 degrees to improve gait.   4. Pt will be Weight Bearing at 100% in CAM boot.     Long Term Goals: 10 weeks   1. Pt will demonstrate independence with HEP.  2. Pt will increase Right ankle strength by 1/2 grade or more in order to increase WB tolerance.  3. Pt will improve FOTO function score by 25% to show improvement in condition and functional mobility.   4. Pt will fully Weight Bearing without boot with 1/10 pain level for a half hour of grocery shopping.    Plan   Plan of care Certification: 12/18/2024 to 3/28/25.    Outpatient Physical Therapy 1-2 times weekly for 10 weeks to include the following interventions: Electrical Stimulation ,, Gait Training, Manual Therapy, Moist Heat/ Ice, Neuromuscular Re-ed, Orthotic Management and Training, Patient Education, Self Care, Therapeutic Activities, Therapeutic Exercise, and Ultrasound.     Jamila Yuen, PT

## 2024-12-20 ENCOUNTER — CLINICAL SUPPORT (OUTPATIENT)
Dept: REHABILITATION | Facility: HOSPITAL | Age: 44
End: 2024-12-20
Payer: COMMERCIAL

## 2024-12-20 ENCOUNTER — TELEPHONE (OUTPATIENT)
Dept: ORTHOPEDICS | Facility: CLINIC | Age: 44
End: 2024-12-20
Payer: COMMERCIAL

## 2024-12-20 ENCOUNTER — PATIENT MESSAGE (OUTPATIENT)
Dept: ORTHOPEDICS | Facility: CLINIC | Age: 44
End: 2024-12-20
Payer: COMMERCIAL

## 2024-12-20 DIAGNOSIS — M25.671 DECREASED RANGE OF MOTION OF RIGHT ANKLE: Primary | ICD-10-CM

## 2024-12-20 DIAGNOSIS — R26.89 IMPAIRED GAIT AND MOBILITY: ICD-10-CM

## 2024-12-20 DIAGNOSIS — R29.898 ANKLE WEAKNESS: ICD-10-CM

## 2024-12-20 PROCEDURE — 97110 THERAPEUTIC EXERCISES: CPT | Mod: PN

## 2024-12-20 PROCEDURE — 97112 NEUROMUSCULAR REEDUCATION: CPT | Mod: PN

## 2024-12-20 PROCEDURE — 97530 THERAPEUTIC ACTIVITIES: CPT | Mod: PN

## 2024-12-20 NOTE — TELEPHONE ENCOUNTER
Contacted patient. All questions and concerns have been answered about boot and heel wedges. Patient verbalized understanding.

## 2024-12-20 NOTE — PROGRESS NOTES
[facility]   Physical Therapy Treatment Note      Name: Chad Chen  Clinic Number: [id number]    Therapy Diagnosis:   Encounter Diagnoses   Name Primary?    Decreased range of motion of right ankle Yes    Ankle weakness     Impaired gait and mobility      Physician: Lew Helton MD    Visit Date: 12/20/2024    Physician Orders: PT Eval and Treat   Medical Diagnosis from Referral:   M76.60 (ICD-10-CM) - Insertional Achilles tendinopathy   Q79.8 (ICD-10-CM) - Congenital contracture of right gastrocnemius muscle   S/p Right Achilles Tendon Repair  Evaluation Date: 12/18/2024  Authorization Period Expiration: 12/17/2025  Plan of Care Expiration: 3/28/2025 or 20v post eval  Visit # (episodes) / Visits authorized: 1/ eval  +5 (POC 1/20)   2nd FOTO visit 5  3rd FOTO visit 10  Progress Note Due: 1/18/2025  PTA: 0/5        Time In: 1230  Time Out: 125  Total Billable Time: 55 minutes     Precautions: Asthma; HTN; Weight Bearing restrictions (see protocol)  Insurance: Payor: [employer] / Plan: BCBS ALL OUT OF STATE / Product Type: PPO /     Subjective     Pt reports: he has some pain with Weight Bearing. Boot is aggravating his arch. Reports he fell again when his right hand gave out on the crutch. Same problem with walker. Reports ankle is fine post fall.     He was compliant with home exercise program.  Response to previous treatment: positive  Functional change: none reported    Pain: 4/10  Location:Right achilles; above heel      Objective      Objective Measures updated at progress report unless specified.     Treatment     12/18/2024 @5.5 weeks    DOS:11/11/2024  Procedure(s) (LRB):  REPAIR, TENDON, ACHILLES (Right)  EXCISION,JULIA DEFORMITY,CALCANEUS (Right)  RECESSION, MUSCLE, GASTROCNEMIUS OPEN (Right)    NOTE: Pt has Asperger's syndrome and Audio processing disorder / Heptaphobic (let him know he will be touched)    Chad received the treatments listed below:      Chad  received therapeutic exercises to develop strength, ROM, and flexibility for 15 minutes including:  NMRE utilized to activate appropriate mm to improve balance, proprioception, stability and gait skills: 30 minutes - 50% Weight Bearing status     TABLE:  Seated Long Arc Quad, 2x15 Bilateral - 2#, more next  Straight Leg Raise: Supine / Sidelying abd / Prone / Sidelying Adduction, 4 ways, 2x15 Bilateral   Prone Hamstring curls, 2x15, 2# more next  Add Supine glute squeezes       therapeutic activities to improve dynamic functional performance for 10  minutes, including:   STAND:  Train Weight Bearing status in the bars, 50% on scale  [place], Right only, standing left only, 3x10        ADD NEXT: Note Right wrist pain/ follow protocol below  Edema Massage  Modalities as needed for pn mgt          File Link     Patient Education and Home Exercises       Education provided:   - daily HEP  - ice with increased swelling and edema  - contact surgeon with boot irritation    Written Home Exercises Provided: Patient instructed to cont prior HEP. Exercises were reviewed and Chad was able to demonstrate them prior to the end of the session.  Chad demonstrated good  understanding of the education provided. See EMR under Patient Instructions for exercises provided during therapy sessions    Assessment     Pt demonstrates moderate levels of pain, decreased right ankle Range of Motion, weakness, and balance and gait deficits. Pt able to perform all therex given with minimal pn provocation. Focus on hip and Bilateral Lower Extremity strengthening while ankle is only at 50% Weight Bearing status. Contacted surgeon in regards to pt Boot irritation. Able to send picture to surgeon via outlook email. Continue to progress as tolerated.     Chad Is progressing well towards his goals.   Pt prognosis is Good.     Pt will continue to benefit from skilled outpatient physical therapy to address the deficits listed in the problem list box on  initial evaluation, provide pt/family education and to maximize pt's level of independence in the home and community environment.     Pt's spiritual, cultural and educational needs considered and pt agreeable to plan of care and goals.     Anticipated barriers to physical therapy: compliance    Goals:   Short Term Goals: 5 weeks   1. Pt will demonstrate compliance with HEP.  2. Pt will decrease swelling of ankle by .5 cm to reduce pain performing daily life functions.  3. Pt will increase DF ROM by 10 degrees to improve gait.   4. Pt will be Weight Bearing at 100% in CAM boot.     Long Term Goals: 10 weeks   1. Pt will demonstrate independence with HEP.  2. Pt will increase Right ankle strength by 1/2 grade or more in order to increase WB tolerance.  3. Pt will improve FOTO function score by 25% to show improvement in condition and functional mobility.   4. Pt will fully Weight Bearing without boot with 1/10 pain level for a half hour of grocery shopping.    Plan     Continue PT as deemed per POC: currently hip and Right Lower Extremity strengthening; 50% Weight Bearing status    Jamila Yuen DPT

## 2024-12-20 NOTE — TELEPHONE ENCOUNTER
----- Message from Sherman Leonardo sent at 12/20/2024  1:38 PM CST -----  Contact: pt  Call back   [phone]

## 2024-12-23 ENCOUNTER — CLINICAL SUPPORT (OUTPATIENT)
Dept: REHABILITATION | Facility: HOSPITAL | Age: 44
End: 2024-12-23
Payer: COMMERCIAL

## 2024-12-23 DIAGNOSIS — R26.89 IMPAIRED GAIT AND MOBILITY: ICD-10-CM

## 2024-12-23 DIAGNOSIS — R29.898 ANKLE WEAKNESS: ICD-10-CM

## 2024-12-23 DIAGNOSIS — M25.671 DECREASED RANGE OF MOTION OF RIGHT ANKLE: Primary | ICD-10-CM

## 2024-12-23 PROCEDURE — 97110 THERAPEUTIC EXERCISES: CPT | Mod: PN

## 2024-12-23 PROCEDURE — 97530 THERAPEUTIC ACTIVITIES: CPT | Mod: PN

## 2024-12-23 PROCEDURE — 97112 NEUROMUSCULAR REEDUCATION: CPT | Mod: PN

## 2024-12-23 NOTE — PROGRESS NOTES
[facility]   Physical Therapy Treatment Note      Name: Chad Chen  Clinic Number: [id number]    Therapy Diagnosis:   Encounter Diagnoses   Name Primary?    Decreased range of motion of right ankle Yes    Ankle weakness     Impaired gait and mobility      Physician: Lew Helton MD    Visit Date: 12/23/2024    Physician Orders: PT Eval and Treat   Medical Diagnosis from Referral:   M76.60 (ICD-10-CM) - Insertional Achilles tendinopathy   Q79.8 (ICD-10-CM) - Congenital contracture of right gastrocnemius muscle   S/p Right Achilles Tendon Repair  Evaluation Date: 12/18/2024  Authorization Period Expiration: 12/17/2025  Plan of Care Expiration: 3/28/2025 or 20v post eval  Visit # (episodes) / Visits authorized: 1/ eval  +5 (POC 1/20)   2nd FOTO visit 5  3rd FOTO visit 10  Progress Note Due: 1/18/2025  PTA: 0/5        Time In: 1230  Time Out: 125  Total Billable Time: 55 minutes     Precautions: Asthma; HTN; Weight Bearing restrictions (see protocol)  Insurance: Payor: [employer] / Plan: BCBS ALL OUT OF STATE / Product Type: PPO /     Subjective     Pt reports: he spoke with surgeon's office which had him remove some skin with tweezers. Instructed him to pump the boot more for comfort. No problems with working on it.     He was compliant with home exercise program.  Response to previous treatment: positive  Functional change: none reported    Pain: 3/10  Location:Right achilles; above heel      Objective      Objective Measures updated at progress report unless specified.     Treatment     12/23/2024 @6 weeks    DOS:11/11/2024  Procedure(s) (LRB):  REPAIR, TENDON, ACHILLES (Right)  EXCISION,JULAI DEFORMITY,CALCANEUS (Right)  RECESSION, MUSCLE, GASTROCNEMIUS OPEN (Right)    NOTE: Pt has Asperger's syndrome and Audio processing disorder / Heptaphobic (let him know he will be touched)    Chad received the treatments listed below:      Chad received therapeutic exercises to  develop strength, ROM, and flexibility for 15 minutes including:  NMRE utilized to activate appropriate mm to improve balance, proprioception, stability and gait skills: 30 minutes - 50% Weight Bearing status     TABLE:  Seated Long Arc Quad, 2x15 Bilateral - 4#  Prone Hamstring curls, 2x15, 4#  Straight Leg Raise: Supine / Sidelying abd / Prone / Sidelying Adduction, 4 ways, 2x15 Bilateral   Supine glute squeezes, [id number]       therapeutic activities to improve dynamic functional performance for 10  minutes, including:   STAND:  Train Weight Bearing status in the bars, 50% on scale   [place], Right only, standing left only, 3x10        ADD NEXT: Note Right wrist pain/ follow protocol below  Edema Massage  Modalities as needed for pn mgt  Gentle AROM: DF / PF / INV / EV  75% Weight Bearing status          File Link     Patient Education and Home Exercises       Education provided:   - daily HEP  - ice with increased swelling and edema  - contact surgeon with boot irritation    Written Home Exercises Provided: Patient instructed to cont prior HEP. Exercises were reviewed and Chad was able to demonstrate them prior to the end of the session.  Chad demonstrated good  understanding of the education provided. See EMR under Patient Instructions for exercises provided during therapy sessions    Assessment     Pt demonstrates moderate levels of pain, decreased right ankle Range of Motion, weakness, and balance and gait deficits. Pt able to perform all therex given with minimal pn provocation. Focus on hip and Bilateral Lower Extremity strengthening while ankle is only at 50% Weight Bearing status. Healing of calcaneous improved and sent updated pic to surgeon through secure chat. Increase ankle weight amount today. Continue to progress as tolerated.     Chad Is progressing well towards his goals.   Pt prognosis is Good.     Pt will continue to benefit from skilled outpatient physical therapy to address the deficits listed in  the problem list box on initial evaluation, provide pt/family education and to maximize pt's level of independence in the home and community environment.     Pt's spiritual, cultural and educational needs considered and pt agreeable to plan of care and goals.     Anticipated barriers to physical therapy: compliance    Goals:   Short Term Goals: 5 weeks   1. Pt will demonstrate compliance with HEP.  2. Pt will decrease swelling of ankle by .5 cm to reduce pain performing daily life functions.  3. Pt will increase DF ROM by 10 degrees to improve gait.   4. Pt will be Weight Bearing at 100% in CAM boot.     Long Term Goals: 10 weeks   1. Pt will demonstrate independence with HEP.  2. Pt will increase Right ankle strength by 1/2 grade or more in order to increase WB tolerance.  3. Pt will improve FOTO function score by 25% to show improvement in condition and functional mobility.   4. Pt will fully Weight Bearing without boot with 1/10 pain level for a half hour of grocery shopping.    Plan     Continue PT as deemed per POC: currently hip and Right Lower Extremity strengthening; 50% Weight Bearing status    Jamila Yuen DPT

## 2024-12-30 ENCOUNTER — CLINICAL SUPPORT (OUTPATIENT)
Dept: REHABILITATION | Facility: HOSPITAL | Age: 44
End: 2024-12-30
Payer: COMMERCIAL

## 2024-12-30 DIAGNOSIS — M25.671 DECREASED RANGE OF MOTION OF RIGHT ANKLE: Primary | ICD-10-CM

## 2024-12-30 DIAGNOSIS — R29.898 ANKLE WEAKNESS: ICD-10-CM

## 2024-12-30 DIAGNOSIS — R26.89 IMPAIRED GAIT AND MOBILITY: ICD-10-CM

## 2024-12-30 PROCEDURE — 97110 THERAPEUTIC EXERCISES: CPT | Mod: PN

## 2024-12-30 PROCEDURE — 97530 THERAPEUTIC ACTIVITIES: CPT | Mod: PN

## 2024-12-30 PROCEDURE — 97112 NEUROMUSCULAR REEDUCATION: CPT | Mod: PN

## 2024-12-30 NOTE — PROGRESS NOTES
[facility]   Physical Therapy Treatment Note      Name: Chad Chen  Clinic Number: [id number]    Therapy Diagnosis:   Encounter Diagnoses   Name Primary?    Decreased range of motion of right ankle Yes    Ankle weakness     Impaired gait and mobility      Physician: Lew Helton MD    Visit Date: 12/30/2024    Physician Orders: PT Eval and Treat   Medical Diagnosis from Referral:   M76.60 (ICD-10-CM) - Insertional Achilles tendinopathy   Q79.8 (ICD-10-CM) - Congenital contracture of right gastrocnemius muscle   S/p Right Achilles Tendon Repair  Evaluation Date: 12/18/2024  Authorization Period Expiration: 12/17/2025  Plan of Care Expiration: 3/28/2025 or 20v post eval  Visit # (episodes) / Visits authorized: 4 / eval  +5 (POC 3/20)   2nd FOTO visit 5  3rd FOTO visit 10  Progress Note Due: 1/18/2025  PTA: 0/5        Time In: 400  Time Out: 455  Total Billable Time: 55 minutes     Precautions: Asthma; HTN; Weight Bearing restrictions (see protocol)  Insurance: Payor: [place] / Plan: BCBS ALL OUT OF STATE / Product Type: PPO /     Subjective     Pt reports: Added another inch to his elevator shoe. Things going well. Only discomfort in arch of foot.     He was compliant with home exercise program.  Response to previous treatment: positive  Functional change: none reported    Pain: 3/10  Location:Right achilles; above heel      Objective      Objective Measures updated at progress report unless specified.     Treatment     12/23/2024 @7weeks    DOS:11/11/2024  Procedure(s) (LRB):  REPAIR, TENDON, ACHILLES (Right)  EXCISION,JULIA DEFORMITY,CALCANEUS (Right)  RECESSION, MUSCLE, GASTROCNEMIUS OPEN (Right)    NOTE: Pt has Asperger's syndrome and Audio processing disorder / Heptaphobic (let him know he will be touched)    Chad received the treatments listed below:      Chad received therapeutic exercises to develop strength, ROM, and flexibility for 15 minutes including:  NMRE  "utilized to activate appropriate mm to improve balance, proprioception, stability and gait skills: 30 minutes - 75% Weight Bearing status     TABLE:  +Gentle 4-way AROM, 30x, cue "not too big"  Seated Long Arc Quad, 2x15 Bilateral - 4#  Prone Hamstring curls, 2x15, 4#  Straight Leg Raise: Supine / Sidelying abd / Prone / Sidelying Adduction, 4 ways, 2x15 Bilateral   Supine glute squeezes, [id number]       therapeutic activities to improve dynamic functional performance for 10  minutes, including:   STAND:  Train Weight Bearing status in the bars, 75% on scale   [place], Right only, standing left only, 3x10        ADD NEXT: Note Right wrist pain/ follow protocol below  Edema Massage  Modalities as needed for pn mgt            File Link     Patient Education and Home Exercises       Education provided:   - daily HEP  - ice with increased swelling and edema  - contact surgeon with boot irritation    Written Home Exercises Provided: Patient instructed to cont prior HEP. Exercises were reviewed and Chad was able to demonstrate them prior to the end of the session.  Chad demonstrated good  understanding of the education provided. See EMR under Patient Instructions for exercises provided during therapy sessions    Assessment     Pt demonstrates lower levels of pain, decreased right ankle Range of Motion, weakness, and balance and gait deficits. Pt able to perform all therex given with minimal pn provocation. Focus on hip and Bilateral Lower Extremity strengthening while ankle is only at 75% Weight Bearing status. Healing of calcaneous continues to improve. Discussed staying on crutch until he is at 9 weeks. Continue to progress as tolerated.     Chad Is progressing well towards his goals.   Pt prognosis is Good.     Pt will continue to benefit from skilled outpatient physical therapy to address the deficits listed in the problem list box on initial evaluation, provide pt/family education and to maximize pt's level of " independence in the home and community environment.     Pt's spiritual, cultural and educational needs considered and pt agreeable to plan of care and goals.     Anticipated barriers to physical therapy: compliance    Goals:   Short Term Goals: 5 weeks PROGRESS 12/30/2024  1. Pt will demonstrate compliance with HEP.  2. Pt will decrease swelling of ankle by .5 cm to reduce pain performing daily life functions.  3. Pt will increase DF ROM by 10 degrees to improve gait.   4. Pt will be Weight Bearing at 100% in CAM boot.     Long Term Goals: 10 weeks   1. Pt will demonstrate independence with HEP.  2. Pt will increase Right ankle strength by 1/2 grade or more in order to increase WB tolerance.  3. Pt will improve FOTO function score by 25% to show improvement in condition and functional mobility.   4. Pt will fully Weight Bearing without boot with 1/10 pain level for a half hour of grocery shopping.    Plan     Continue PT as deemed per POC: currently hip and Right Lower Extremity strengthening; 75% Weight Bearing status    Jamila Yuen DPT

## 2025-01-06 ENCOUNTER — PATIENT MESSAGE (OUTPATIENT)
Dept: ORTHOPEDICS | Facility: CLINIC | Age: 45
End: 2025-01-06
Payer: COMMERCIAL

## 2025-01-06 ENCOUNTER — CLINICAL SUPPORT (OUTPATIENT)
Dept: REHABILITATION | Facility: HOSPITAL | Age: 45
End: 2025-01-06
Payer: COMMERCIAL

## 2025-01-06 DIAGNOSIS — M25.671 DECREASED RANGE OF MOTION OF RIGHT ANKLE: Primary | ICD-10-CM

## 2025-01-06 DIAGNOSIS — R26.89 IMPAIRED GAIT AND MOBILITY: ICD-10-CM

## 2025-01-06 DIAGNOSIS — R29.898 ANKLE WEAKNESS: ICD-10-CM

## 2025-01-06 PROCEDURE — 97112 NEUROMUSCULAR REEDUCATION: CPT | Mod: PN

## 2025-01-06 PROCEDURE — 97530 THERAPEUTIC ACTIVITIES: CPT | Mod: PN

## 2025-01-06 PROCEDURE — 97110 THERAPEUTIC EXERCISES: CPT | Mod: PN

## 2025-01-06 NOTE — PROGRESS NOTES
[facility]   Physical Therapy Treatment Note      Name: Chad Chen  Clinic Number: [id number]    Therapy Diagnosis:   Encounter Diagnoses   Name Primary?    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   S/p Right Achilles Tendon Repair  Evaluation Date: 12/18/2024  Authorization Period Expiration: 12/17/2025  Plan of Care Expiration: 3/28/2025 or 20v post eval  Visit # (episodes) / Visits authorized: 5 / eval  +5 (POC 4/20)   2nd FOTO visit 5  3rd FOTO visit 10  Progress Note Due: 1/18/2025  PTA: 0/5        Time In: 400  Time Out: 450  Total Billable Time: 50 minutes     Precautions: Asthma; HTN; Weight Bearing restrictions (see protocol)  Insurance: Payor: [employer] / Plan: CIGNA OPEN ACCESS PLUS / Product Type: Commercial /     Subjective     Pt reports: stopped using crutch 2 days ago.    He was compliant with home exercise program.  Response to previous treatment: positive  Functional change: none reported    Pain: 3/10  Location:Right achilles; above heel      Objective      Objective Measures updated at progress report unless specified.     Treatment     1/6/2025 @8weeks    DOS:11/11/2024  Procedure(s) (LRB):  REPAIR, TENDON, ACHILLES (Right)  EXCISION,JULIA DEFORMITY,CALCANEUS (Right)  RECESSION, MUSCLE, GASTROCNEMIUS OPEN (Right)    NOTE: Pt has Asperger's syndrome and Audio processing disorder / Heptaphobic (let him know he will be touched)    Chad received the treatments listed below:      Chad received therapeutic exercises to develop strength, ROM, and flexibility for 10 minutes including:  NMRE utilized to activate appropriate mm to improve balance, proprioception,  "stability and gait skills: 30 minutes - 75% Weight Bearing status     TABLE:  Gentle 4-way AROM, 30x, cue "not too big"  Seated Long Arc Quad, 2x15 Bilateral - 4#  Prone Hamstring curls, 2x15, 4#  Straight Leg Raise: 2# Supine / Sidelying abd / Prone / Sidelying Adduction, 4 ways, 2x15 Bilateral   Supine glute squeezes, [id number]       therapeutic activities to improve dynamic functional performance for 10  minutes, including:   STAND:  Train Weight Bearing status in the bars, WBAT  [place], Right only, standing left only, 3x10        ADD NEXT: Note Right wrist pain/ follow protocol below (surgery 1/16/2025)  Ankle protocol - look at restrictions  Remove 1 lift  Bridge  Pre-Gait training          File Link     Patient Education and Home Exercises       Education provided:   - daily HEP  - ice with increased swelling and edema  - contact surgeon with boot irritation    Written Home Exercises Provided: Patient instructed to cont prior HEP. Exercises were reviewed and Chad was able to demonstrate them prior to the end of the session.  Chad demonstrated good  understanding of the education provided. See EMR under Patient Instructions for exercises provided during therapy sessions    Assessment     Pt demonstrates lower levels of pain, decreased right ankle Range of Motion, weakness, and balance and gait deficits. Pt able to perform all therex given with minimal pn provocation. Focus on hip and Bilateral Lower Extremity strengthening. Pt put himself on 100% Weight Bearing status 2 days ago without crutch; no increase in pain and healing of calcaneous continues to improve. Continue to progress as tolerated.     Chad Is progressing well towards his goals.   Pt prognosis is Good.     Pt will continue to benefit from skilled outpatient physical therapy to address the deficits listed in the problem list box on initial evaluation, provide pt/family education and to maximize pt's level of independence in the home and community " environment.     Pt's spiritual, cultural and educational needs considered and pt agreeable to plan of care and goals.     Anticipated barriers to physical therapy: compliance    Goals:   Short Term Goals: 5 weeks PROGRESS 1/6/2025  1. Pt will demonstrate compliance with HEP.  2. Pt will decrease swelling of ankle by .5 cm to reduce pain performing daily life functions.  3. Pt will increase DF ROM by 10 degrees to improve gait.   4. Pt will be Weight Bearing at 100% in CAM boot.     Long Term Goals: 10 weeks   1. Pt will demonstrate independence with HEP.  2. Pt will increase Right ankle strength by 1/2 grade or more in order to increase WB tolerance.  3. Pt will improve FOTO function score by 25% to show improvement in condition and functional mobility.   4. Pt will fully Weight Bearing without boot with 1/10 pain level for a half hour of grocery shopping.    Plan     Continue PT as deemed per POC: currently hip and Right Lower Extremity strengthening; 75% Weight Bearing status    Jamila Yuen DPT

## 2025-01-13 ENCOUNTER — CLINICAL SUPPORT (OUTPATIENT)
Dept: REHABILITATION | Facility: HOSPITAL | Age: 45
End: 2025-01-13
Payer: COMMERCIAL

## 2025-01-13 DIAGNOSIS — R26.89 IMPAIRED GAIT AND MOBILITY: ICD-10-CM

## 2025-01-13 DIAGNOSIS — M25.671 DECREASED RANGE OF MOTION OF RIGHT ANKLE: Primary | ICD-10-CM

## 2025-01-13 DIAGNOSIS — R29.898 ANKLE WEAKNESS: ICD-10-CM

## 2025-01-13 PROCEDURE — 97110 THERAPEUTIC EXERCISES: CPT | Mod: PN,CQ

## 2025-01-13 PROCEDURE — 97112 NEUROMUSCULAR REEDUCATION: CPT | Mod: PN,CQ

## 2025-01-13 PROCEDURE — 97530 THERAPEUTIC ACTIVITIES: CPT | Mod: PN,CQ

## 2025-01-13 NOTE — PROGRESS NOTES
[facility]   Physical Therapy Treatment Note      Name: Chad Chen  Clinic Number: [id number]    Therapy Diagnosis:   Encounter Diagnoses   Name Primary?    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   S/p Right Achilles Tendon Repair  Evaluation Date: 12/18/2024  Authorization Period Expiration: 12/17/2025  Plan of Care Expiration: 3/28/2025 or 20v post eval  Visit # (episodes) / Visits authorized: 2 / 25 + 4 on prior auth (POC 5/20)   2nd FOTO visit 5  3rd FOTO visit 10  Progress Note Due: 1/18/2025  PTA: 1/5        Time In: 3:00  Time Out: 4:05  Total Billable Time: 65 minutes     Precautions: Asthma; HTN; Weight Bearing restrictions (see protocol)  Insurance: Payor: [employer] / Plan: CIGNA OPEN ACCESS PLUS / Product Type: Commercial /     Subjective     Pt reports: has been feeling better overall, looking forward to getting rid of a wedge. Has not been using his crutches and weight bearing has been tolerable with no pain.     He was compliant with home exercise program.  Response to previous treatment: positive  Functional change: none reported    Pain: 3/10  Location:Right achilles; above heel      Objective      Objective Measures updated at progress report unless specified.     Treatment     1/13/2025 @9weeks    DOS:11/11/2024  Procedure(s) (LRB):  REPAIR, TENDON, ACHILLES (Right)  EXCISION,JULIA DEFORMITY,CALCANEUS (Right)  RECESSION, MUSCLE, GASTROCNEMIUS OPEN (Right)    NOTE: Pt has Asperger's syndrome and Audio processing disorder / Heptaphobic (let him know he will be touched)    Chad received the treatments listed below:      Chad received therapeutic exercises to  "develop strength, ROM, and flexibility for 10 minutes including:  NMRE utilized to activate appropriate mm to improve balance, proprioception, stability and gait skills: 30 minutes - 100% WBAT Weight Bearing status     TABLE:  Bridges (boot donned) 2 x 10 x 5" holds  Gentle 4-way AROM, 10x, cue "not too big" - already did today so reduced reps  Seated Long Arc Quad, 2x15 Bilateral - 4# (maybe try 5#)  Prone Hamstring curls, 2x15, 4# (maybe try 5# next time)  Straight Leg Raise: 2# Supine / Sidelying abd / Prone / Sidelying Adduction, 4 ways, 2x15 Bilateral   NP due to progress to bridges--Supine glute squeezes, [id number]  +Seated heel slides to gentle stretch 2 x 10 x 5" hold  +Seated BAPS board L2 PF/DF, INV/EV, circles x 10 each of all directions - may be able to try L3      therapeutic activities to improve dynamic functional performance for 15 minutes, including:   Removal of 1 lift in boot, pt to remove 1 lift in left shoe lift at home as well to even posture  Patient instructed that due to healing time post-operatively, he may begin taking boot off for sleep as per protocol recommendation.  +Upright bike, seat 7(visible), L5 x 5 min (feet on top of straps  STAND:  Train Weight Bearing status in the bars, WBAT  [place], 2 x 5 each, then x 10 with R, standing on L only        ADD NEXT: Note Right wrist pain/ follow protocol below (surgery 1/16/2025)  Ankle protocol - look at restrictions  Maybe: shuttle press (boot donned) (bilateral and single leg)  Pre-Gait training          File Link     Patient Education and Home Exercises       Education provided:   - daily HEP  - ice with increased swelling and edema  - contact surgeon with boot irritation    Written Home Exercises Provided: Patient instructed to cont prior HEP. Exercises were reviewed and Chad was able to demonstrate them prior to the end of the session.  Chad demonstrated good  understanding of the education provided. See EMR under Patient Instructions for " exercises provided during therapy sessions    Assessment     Pt able to remove 1 lift from boot today, noted difference with angle of pull for the right gastroc/achilles. Educated on expectation for possible soreness and pulling to the achilles due to change in foot positioning and progression of weight bearing status. Patient had very good understanding of weight bearing progressions and activity modification to avoid over-stressing weight bearing activity. Minimal levels of pain throughout session, able to slowly progress ankle mobility and motor control with both open and closed chain activity.  Continue to progress with protocol as tolerated.     Chad Is progressing well towards his goals.   Pt prognosis is Good.     Pt will continue to benefit from skilled outpatient physical therapy to address the deficits listed in the problem list box on initial evaluation, provide pt/family education and to maximize pt's level of independence in the home and community environment.     Pt's spiritual, cultural and educational needs considered and pt agreeable to plan of care and goals.     Anticipated barriers to physical therapy: compliance    Goals:   Short Term Goals: 5 weeks PROGRESS 1/13/2025  1. Pt will demonstrate compliance with HEP.  2. Pt will decrease swelling of ankle by .5 cm to reduce pain performing daily life functions.  3. Pt will increase DF ROM by 10 degrees to improve gait.   4. Pt will be Weight Bearing at 100% in CAM boot.     Long Term Goals: 10 weeks   1. Pt will demonstrate independence with HEP.  2. Pt will increase Right ankle strength by 1/2 grade or more in order to increase WB tolerance.  3. Pt will improve FOTO function score by 25% to show improvement in condition and functional mobility.   4. Pt will fully Weight Bearing without boot with 1/10 pain level for a half hour of grocery shopping.    Plan     Continue PT as deemed per POC: currently hip and Right Lower Extremity strengthening; 100%  Weight Bearing as tolerated with boot donned.    Daphne Gonzalez, DERIK

## 2025-01-14 ENCOUNTER — OFFICE VISIT (OUTPATIENT)
Dept: ORTHOPEDICS | Facility: CLINIC | Age: 45
End: 2025-01-14
Payer: COMMERCIAL

## 2025-01-14 VITALS — HEIGHT: 71 IN | BODY MASS INDEX: 35.71 KG/M2 | WEIGHT: 255.06 LBS

## 2025-01-14 DIAGNOSIS — M76.60 INSERTIONAL ACHILLES TENDINOPATHY: Primary | ICD-10-CM

## 2025-01-14 PROCEDURE — 99999 PR PBB SHADOW E&M-EST. PATIENT-LVL III: CPT | Mod: PBBFAC,,, | Performed by: ORTHOPAEDIC SURGERY

## 2025-01-14 PROCEDURE — 99024 POSTOP FOLLOW-UP VISIT: CPT | Mod: S$GLB,,, | Performed by: ORTHOPAEDIC SURGERY

## 2025-01-14 NOTE — PROGRESS NOTES
Status/Diagnosis: Right gastroc contracture and AICT.  Date of Surgery:   11/11/2024: Right achilles tendon debridement; Mauirsio resection; Elenita.  Date of Injury: none  Return visit: 1 month  X-rays on Return: none      Present History:  Chad Chen is a 45 y.o. male who presents today via referral from Dr. Alex Thao.  Patient endorses an approximately 4 month history worsening right posterior heel pain.  Noticed a bump over the area of concern more recently.  Denies any history of injury or other inciting event.  Patient has minimal pain at rest, increased with weight-bearing and also pain due to irritation from mass effect.  Denies any numbness or tingling.  Recently seen by my partner, Dr. Thao, for evaluation and treatment.  Discussed the use of a heel lift, anti-inflammatories, and physical therapy.  Patient reports that he called to make his 1st appointment with PT however we will not be able to be seen for the next 3 weeks.  Was taking over-the-counter oral ibuprofen as needed for pain intermittently but with minimal relief.  Past medical history significant for hypertension and asthma.  Denies tobacco use.  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    POSTOP:  Patient returns today for routine postop visit.  3/10 pain with mild soreness after physical therapy.  Working with PT 2 times weekly.  Currently full weight-bearing with 2 heel wedges in place.      Past Medical History:   Diagnosis Date    Asthma     exercise induced    Cardiac murmur     Dyslipidemia     Hypertension     Obesity     PONV (postoperative nausea and vomiting)     Seasonal and perennial allergic rhinitis        Past Surgical History:   Procedure Laterality Date    ADENOIDECTOMY  2004    CARPAL TUNNEL RELEASE Left 12/17/2020    Procedure: RELEASE, CARPAL TUNNEL;  Surgeon: Dre Montanez MD;  Location: [facility];  Service: Orthopedics;  Laterality: Left;  Procedure:  Left carpal tunnel release    Position:  Supine    Anesthesia:  General  equipment:  Carpal tunnel Set    EXCISION,JULIA DEFORMITY,CALCANEUS Right 11/11/2024    Procedure: EXCISION,JULIA DEFORMITY,CALCANEUS;  Surgeon: Lew Helton MD;  Location: [facility] OR;  Service: Orthopedics;  Laterality: Right;  make card please    LAPAROSCOPIC CHOLECYSTECTOMY  2010    laparoscopic removal gallstones    RECESSION, MUSCLE, GASTROCNEMIUS Right 11/11/2024    Procedure: RECESSION, MUSCLE, GASTROCNEMIUS OPEN;  Surgeon: Lew Helton MD;  Location: [facility] OR;  Service: Orthopedics;  Laterality: Right;  Make card please    REPAIR OF ACHILLES TENDON Right 11/11/2024    Procedure: REPAIR, TENDON, ACHILLES;  Surgeon: Lew Helton MD;  Location: [facility] OR;  Service: Orthopedics;  Laterality: Right;    TONSILLECTOMY  2004    corrective septoplasty same time.    TRIGGER FINGER RELEASE Right 3/31/2022    Procedure: Right middle finger trigger release;  Surgeon: Dre Montanez MD;  Location: [facility] OR;  Service: Orthopedics;  Laterality: Right;       Current Outpatient Medications   Medication Sig    amLODIPine (NORVASC) 5 MG tablet Take 1 tablet (5 mg total) by mouth once daily.    budesonide-formoterol 160-4.5 mcg (SYMBICORT) 160-4.5 mcg/actuation HFAA Inhale 2 puffs into the lungs every 12 (twelve) hours. Controller    levocetirizine (XYZAL) 5 MG tablet Take 5 mg by mouth every evening.    potassium chloride (KLOR-CON) 10 MEQ TbSR Take 1 tablet (10 mEq total) by mouth 2 (two) times daily.    PROAIR RESPICLICK 90 mcg/actuation inhaler Inhale 2 puffs into the lungs every 6 (six) hours as needed for Wheezing or Shortness of Breath.    sildenafiL (VIAGRA) 50 MG tablet Take 1 tablet by mouth daily as needed for erectile dysfunction.    sumatriptan (IMITREX) 50 MG tablet Take 1 tablet (50 mg total) by mouth daily as needed for Migraine.    valsartan (DIOVAN) 320 MG tablet Take 1 tablet (320 mg total) by mouth once daily.     No current facility-administered medications for this visit.       Review of  patient's allergies indicates:   Allergen Reactions    Aspirin     Inderal [propranolol] Hives    Lisinopril Other (See Comments)     cough    Nsaids (non-steroidal anti-inflammatory drug)      Tolerates celecoxib/celebrex       Family History   Problem Relation Name Age of Onset    Cancer Mother          triple breast cancer at 2 different times.     Breast cancer Mother      Heart disease Father          2V CABG at 45; 12 coronary stents.    Hyperlipidemia Father      Mental illness Father          MIKALA    Stroke Father          traumatic CVA    Vision loss Father          very poor vision    Cancer Maternal Grandfather           from melanoma at his 60's.    Early death Maternal Grandfather           early 60's of melanoma    Melanoma Maternal Grandfather      Diabetes Paternal Grandmother      Hypertension Paternal Grandfather      Diabetes Maternal Uncle      Hypertension Paternal Uncle         Social History     Socioeconomic History    Marital status:    Occupational History    Occupation:  for sales force.    Tobacco Use    Smoking status: Never    Smokeless tobacco: Never   Substance and Sexual Activity    Alcohol use: Not Currently     Comment: no alcohol for 8 months as of 3/31/22    Drug use: Yes     Types: Marijuana     Comment: Consumes     Social Drivers of Health     Financial Resource Strain: Low Risk  (2024)    Overall Financial Resource Strain (CARDIA)     Difficulty of Paying Living Expenses: Not hard at all   Food Insecurity: No Food Insecurity (2024)    Hunger Vital Sign     Worried About Running Out of Food in the Last Year: Never true     Ran Out of Food in the Last Year: Never true   Transportation Needs: No Transportation Needs (2024)    PRAPARE - Transportation     Lack of Transportation (Medical): No     Lack of Transportation (Non-Medical): No   Physical Activity: Insufficiently Active (2024)    Exercise Vital Sign     Days of Exercise  per Week: 3 days     Minutes of Exercise per Session: 40 min   Stress: Stress Concern Present (2/20/2024)    [employer] [employer] - Occupational Stress Questionnaire     Feeling of Stress : To some extent   Housing Stability: Low Risk  (2/20/2024)    Housing Stability Vital Sign     Unable to Pay for Housing in the Last Year: No     Number of Places Lived in the Last Year: 1     Unstable Housing in the Last Year: No       Physical exam:  There were no vitals filed for this visit.  Body mass index is 35.58 kg/m².  General: In no apparent distress; well developed and well nourished.  HEENT: normocephalic; atraumatic.  Cardiovascular: regular rate.  Respiratory: no increased work of breathing.  Musculoskeletal:   Gait: unable to assess  Inspection:   Surgical sites with well-healed scars.  Little to no residual swelling No warmth or erythema.  No signs or symptoms of infection.  Good tension with Ramirez testing.  Symmetric resting ankle plantar flexion with the patient prone and knees flexed 90°.  Gross motor and sensory function intact.  Palpable pedal pulse.                   Imaging Studies/Outside documentation:  I have ordered/reviewed/interpreted the following images/outside documentation:  No new imaging today.        Assessment:  Chad Chen is a 45 y.o. male with Right gastroc contracture and AICT.     Plan:   Patient continues to do well postoperatively.  We will continue physical therapy per protocol as previously outlined.  Follow up in 1 month for repeat evaluation, or sooner if needed.  Patient voiced understanding.  All questions were answered.     As noted previously, patient unable take oral aspirin for DVT prophylaxis.      This note was created using voice recognition software and may contain grammatical errors.

## 2025-01-15 ENCOUNTER — CLINICAL SUPPORT (OUTPATIENT)
Dept: REHABILITATION | Facility: HOSPITAL | Age: 45
End: 2025-01-15
Payer: COMMERCIAL

## 2025-01-15 DIAGNOSIS — R26.89 IMPAIRED GAIT AND MOBILITY: ICD-10-CM

## 2025-01-15 DIAGNOSIS — M25.671 DECREASED RANGE OF MOTION OF RIGHT ANKLE: Primary | ICD-10-CM

## 2025-01-15 DIAGNOSIS — R29.898 ANKLE WEAKNESS: ICD-10-CM

## 2025-01-15 PROCEDURE — 97530 THERAPEUTIC ACTIVITIES: CPT | Mod: PN

## 2025-01-15 PROCEDURE — 97112 NEUROMUSCULAR REEDUCATION: CPT | Mod: PN

## 2025-01-15 PROCEDURE — 97110 THERAPEUTIC EXERCISES: CPT | Mod: PN

## 2025-01-15 NOTE — PROGRESS NOTES
[facility]   Physical Therapy Treatment Note      Name: Chad Chen  Clinic Number: [id number]    Therapy Diagnosis:   Encounter Diagnoses   Name Primary?    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-10-CM) - Congenital contracture of right gastrocnemius muscle   S/p Right Achilles Tendon Repair  Evaluation Date: 12/18/2024  Authorization Period Expiration: 12/17/2025  Plan of Care Expiration: 3/28/2025 or 20v post eval  Visit # (episodes) / Visits authorized: 7 / 25 + 4 on prior auth (POC 6/20)   2nd FOTO visit 5  3rd FOTO visit 10  Progress Note Due: 1/18/2025  PTA: 1/5        Time In: 4:00  Time Out: 4:55  Total Billable Time: 55 minutes     Precautions: Asthma; HTN; Weight Bearing restrictions (see protocol)  Insurance: Payor: [employer] / Plan: [employer] OPEN ACCESS PLUS / Product Type: Commercial /     Subjective     Pt reports: has been increasing activity since 100% Weight Bearing with no increase of pain. Has CTR surgery tmw.    He was compliant with home exercise program.  Response to previous treatment: positive  Functional change: none reported    Pain: 3/10  Location:Right achilles; above heel      Objective      Objective Measures updated at progress report unless specified.     Treatment     1/15/2025 @9weeks/2days    DOS:11/11/2024  Procedure(s) (LRB):  REPAIR, TENDON, ACHILLES (Right)  EXCISION,JULIA DEFORMITY,CALCANEUS (Right)  RECESSION, MUSCLE, GASTROCNEMIUS OPEN (Right)    NOTE: Pt has Asperger's syndrome and Audio processing disorder / Heptaphobic (let him know he will be touched)    Chad received the treatments listed below:      Chad received therapeutic exercises to develop strength, ROM, and flexibility for 10  "minutes including:  NMRE utilized to activate appropriate mm to improve balance, proprioception, stability and gait skills: 30 minutes - 100% WBAT Weight Bearing status     SEATED: footstool  Ankle circles, 30x ea direction  Ankle alphabet, 1 set  Ankle 4-way AROM, 30x ea   Toe extension / crunch  Seated Heel Raises /Toe Raises, 30x ea  Seated DF heel slides to gentle stretch 2 x 10 x 5" hold  Seated PF/DF, INV/EV, circles x 20 each of all directions - [place] board today     TABLE:  Bridges (No boot) 2 x 10 x 5" holds      therapeutic activities to improve dynamic functional performance for 15 minutes, including:     Upright bike, seat 7(visible), L5 x 5 min (feet on top of straps - NOT PERFORMED     STAND:  +Weight shift: For/Back; Side to Side; Stagger Rocking 30x Bilateral   +Hip flexion: 5x alternate bilateral, 20 total  +Hip Abduction: 5x alternate Bilateral, 20 total  +Hip Extension: same  [place], 2 x 5 each, then x 10 with R, standing on L only - NOT PERFORMED           ADD NEXT: Note Right wrist pain/ follow protocol below (surgery 1/16/2025)  Remove heel lift  Maybe: shuttle press (boot donned) (bilateral and single leg)  Gastroc strap stretch, 3x30s  Arch lifts, 20x5s  Toe ext rolls, 5-4-3-2-1, 20x  Yellow Theraband or Red Theraband  to 4-way AROM  Manual tx; edema massage; scar mob  FUTURE visit: once boot is off  Pre-Gait train: 1 min each (airex optional)  1. M-L weight shift   2. Stagger: Affected fleg in front: Heel contact -> Flat foot with KE (mid stance)  3. Stagger: Affected leg in back: Roll through -> push off   Dorsiflexion glides step  Squats  Single Leg Balance   Stand Heel Raises / Toe Raises  Soleus Heel Raises against wall  Pronation/Supination 1/2 foam  Forward Step Up / Forward Step Down  Eccentric heel raises  Toe taps->March  Single Heel Raises  Single Leg squat  Impact: Trampoline              2->2: hops / fast step ups              1->1: Med-Lat ski / Ant-Post Bosu   Return to " running: Walk jog program          File Link     Patient Education and Home Exercises       Education provided:   - daily HEP  - ice with increased swelling and edema  - contact surgeon with boot irritation    Written Home Exercises Provided: Patient instructed to cont prior HEP. Exercises were reviewed and Chad was able to demonstrate them prior to the end of the session.  Chad demonstrated good  understanding of the education provided. See EMR under Patient Instructions for exercises provided during therapy sessions    Assessment     Pt demonstrates same levels of pain as activity increases, decreased right ankle Range of Motion, weakness, and balance and gait deficits. Pt able to perform all therex given with minimal pn provocation. 100% Weight Bearing status in CAM boot going well. Starting seated ankle protocol to improve Range of Motion, flexibility, and Weight Bearing in seated position. Standing open and close chain exercises started and reviewed. Continue to progress as tolerated.     Chad Is progressing well towards his goals.   Pt prognosis is Good.     Pt will continue to benefit from skilled outpatient physical therapy to address the deficits listed in the problem list box on initial evaluation, provide pt/family education and to maximize pt's level of independence in the home and community environment.     Pt's spiritual, cultural and educational needs considered and pt agreeable to plan of care and goals.     Anticipated barriers to physical therapy: compliance    Goals:   Short Term Goals: 5 weeks PROGRESS 1/15/2025  1. Pt will demonstrate compliance with HEP.  2. Pt will decrease swelling of ankle by .5 cm to reduce pain performing daily life functions.  3. Pt will increase DF ROM by 10 degrees to improve gait.   4. Pt will be Weight Bearing at 100% in CAM boot.     Long Term Goals: 10 weeks   1. Pt will demonstrate independence with HEP.  2. Pt will increase Right ankle strength by 1/2 grade or more  in order to increase WB tolerance.  3. Pt will improve FOTO function score by 25% to show improvement in condition and functional mobility.   4. Pt will fully Weight Bearing without boot with 1/10 pain level for a half hour of grocery shopping.    Plan     Continue PT as deemed per POC: currently hip and Right Lower Extremity strengthening; 100% Weight Bearing as tolerated with boot donned.    Jamila Yuen, PT

## 2025-01-16 ENCOUNTER — HOSPITAL ENCOUNTER (OUTPATIENT)
Facility: HOSPITAL | Age: 45
Discharge: HOME | End: 2025-01-16
Attending: ORTHOPAEDIC SURGERY | Admitting: ORTHOPAEDIC SURGERY
Payer: COMMERCIAL

## 2025-01-16 DIAGNOSIS — G56.01 CARPAL TUNNEL SYNDROME OF RIGHT WRIST: ICD-10-CM

## 2025-01-16 PROCEDURE — 63600175 PHARM REV CODE 636 W HCPCS: Mod: PO | Performed by: PHYSICIAN ASSISTANT

## 2025-01-16 PROCEDURE — 63600175 PHARM REV CODE 636 W HCPCS: Mod: PO | Performed by: ORTHOPAEDIC SURGERY

## 2025-01-16 PROCEDURE — 36000707: Mod: PO | Performed by: ORTHOPAEDIC SURGERY

## 2025-01-16 PROCEDURE — 71000015 HC POSTOP RECOV 1ST HR: Mod: PO | Performed by: ORTHOPAEDIC SURGERY

## 2025-01-16 PROCEDURE — 36000706: Mod: PO | Performed by: ORTHOPAEDIC SURGERY

## 2025-01-16 PROCEDURE — 64721 CARPAL TUNNEL SURGERY: CPT | Mod: RT,,, | Performed by: ORTHOPAEDIC SURGERY

## 2025-01-16 RX ORDER — OXYCODONE HYDROCHLORIDE 5 MG/1
5 TABLET ORAL EVERY 4 HOURS PRN
Qty: 5 TABLET | Refills: 0 | Status: SHIPPED | OUTPATIENT
Start: 2025-01-16

## 2025-01-16 RX ORDER — CEFAZOLIN SODIUM 1 G/3ML
2 INJECTION, POWDER, FOR SOLUTION INTRAMUSCULAR; INTRAVENOUS
Status: COMPLETED | OUTPATIENT
Start: 2025-01-16 | End: 2025-01-16

## 2025-01-16 RX ORDER — BUPIVACAINE HYDROCHLORIDE 2.5 MG/ML
INJECTION, SOLUTION EPIDURAL; INFILTRATION; INTRACAUDAL
Status: DISCONTINUED | OUTPATIENT
Start: 2025-01-16 | End: 2025-01-16 | Stop reason: HOSPADM

## 2025-01-16 RX ORDER — LIDOCAINE HYDROCHLORIDE 10 MG/ML
INJECTION, SOLUTION EPIDURAL; INFILTRATION; INTRACAUDAL; PERINEURAL
Status: DISCONTINUED | OUTPATIENT
Start: 2025-01-16 | End: 2025-01-16 | Stop reason: HOSPADM

## 2025-01-16 NOTE — H&P
1/16/2025    Chief Complaint:  No chief complaint on file.      HPI:  Chad Chen is a 45 y.o. male, who presents to the surgery center today.  He is here to undergo a right carpal tunnel release.  Has no new complaints.    PMHX:  Past Medical History:   Diagnosis Date    Asthma     exercise induced    Cardiac murmur     Dyslipidemia     Hypertension     Obesity     PONV (postoperative nausea and vomiting)     Seasonal and perennial allergic rhinitis        PSHX:  Past Surgical History:   Procedure Laterality Date    ADENOIDECTOMY  2004    CARPAL TUNNEL RELEASE Left 12/17/2020    Procedure: RELEASE, CARPAL TUNNEL;  Surgeon: Dre Montaenz MD;  Location: [facility] OR;  Service: Orthopedics;  Laterality: Left;  Procedure:  Left carpal tunnel release    Position:  Supine    Anesthesia:  General equipment:  Carpal tunnel Set    EXCISION,JULIA DEFORMITY,CALCANEUS Right 11/11/2024    Procedure: EXCISION,JULIA DEFORMITY,CALCANEUS;  Surgeon: Lew Helton MD;  Location: [facility] OR;  Service: Orthopedics;  Laterality: Right;  make card please    LAPAROSCOPIC CHOLECYSTECTOMY  2010    laparoscopic removal gallstones    RECESSION, MUSCLE, GASTROCNEMIUS Right 11/11/2024    Procedure: RECESSION, MUSCLE, GASTROCNEMIUS OPEN;  Surgeon: Lew Helton MD;  Location: [facility] OR;  Service: Orthopedics;  Laterality: Right;  Make card please    REPAIR OF ACHILLES TENDON Right 11/11/2024    Procedure: REPAIR, TENDON, ACHILLES;  Surgeon: Lew Helton MD;  Location: [facility] OR;  Service: Orthopedics;  Laterality: Right;    TONSILLECTOMY  2004    corrective septoplasty same time.    TRIGGER FINGER RELEASE Right 3/31/2022    Procedure: Right middle finger trigger release;  Surgeon: Dre Montanez MD;  Location: [facility] OR;  Service: Orthopedics;  Laterality: Right;       FMHX:  Family History   Problem Relation Name Age of Onset    Cancer Mother          triple breast cancer at 2 different times.     Breast cancer Mother      Heart  disease Father          2V CABG at 45; 12 coronary stents.    Hyperlipidemia Father      Mental illness Father          MIKALA    Stroke Father          traumatic CVA    Vision loss Father          very poor vision    Cancer Maternal Grandfather           from melanoma at his 60's.    Early death Maternal Grandfather           early 60's of melanoma    Melanoma Maternal Grandfather      Diabetes Paternal Grandmother      Hypertension Paternal Grandfather      Diabetes Maternal Uncle      Hypertension Paternal Uncle         SOCHX:  Social History     Tobacco Use    Smoking status: Never    Smokeless tobacco: Never   Substance Use Topics    Alcohol use: Not Currently     Comment: no alcohol for 8 months as of 3/31/22       ALLERGIES:  Aspirin, Inderal [propranolol], Lisinopril, and Nsaids (non-steroidal anti-inflammatory drug)    CURRENT MEDICATIONS:  No current facility-administered medications on file prior to encounter.     Current Outpatient Medications on File Prior to Encounter   Medication Sig Dispense Refill    amLODIPine (NORVASC) 5 MG tablet Take 1 tablet (5 mg total) by mouth once daily. 90 tablet 3    levocetirizine (XYZAL) 5 MG tablet Take 5 mg by mouth every evening.      potassium chloride (KLOR-CON) 10 MEQ TbSR Take 1 tablet (10 mEq total) by mouth 2 (two) times daily. 180 tablet 3    valsartan (DIOVAN) 320 MG tablet Take 1 tablet (320 mg total) by mouth once daily. 90 tablet 3    budesonide-formoterol 160-4.5 mcg (SYMBICORT) 160-4.5 mcg/actuation HFAA Inhale 2 puffs into the lungs every 12 (twelve) hours. Controller 10.2 g 3    PROAIR RESPICLICK 90 mcg/actuation inhaler Inhale 2 puffs into the lungs every 6 (six) hours as needed for Wheezing or Shortness of Breath. 1 each 1    sildenafiL (VIAGRA) 50 MG tablet Take 1 tablet by mouth daily as needed for erectile dysfunction. 30 tablet 9    sumatriptan (IMITREX) 50 MG tablet Take 1 tablet (50 mg total) by mouth daily as needed for Migraine. 9 tablet  "1       REVIEW OF SYSTEMS:  ROS    GENERAL PHYSICAL EXAM:   /81 (BP Location: Right arm, Patient Position: Sitting)   Pulse (!) 55   Temp 97.9 °F (36.6 °C) (Skin)   Resp 16   Ht 5' 11" (1.803 m)   Wt 115.7 kg (255 lb)   SpO2 97%   BMI 35.57 kg/m²    GEN: well developed, well nourished, no acute distress   HENT: Normocephalic, atraumatic   EYES: No discharge, conjunctiva normal   NECK: Supple, non-tender   PULM: No wheezing, no respiratory distress   CV: RRR   ABD: Soft, non-tender    ORTHO EXAM:   Examination of the right hand reveals that there is no edema. There are no skin changes. Palpation produces no tenderness. Has grossly intact sensation in the median, radial, and ulnar distribution. Has positive Tinel's and positive Durkan's test. Has 2+ radial pulse     RADIOLOGY:   None    ASSESSMENT:   Right carpal tunnel syndrome    PLAN:  1. Have reviewed the risks and benefits of right carpal tunnel release.  The patient has confirmed informed consent     2.  Will proceed with right carpal tunnel release     3.  Will follow up with me in 2 weeks   "

## 2025-01-16 NOTE — DISCHARGE INSTRUCTIONS
Procedure: right carpal tunnel release    1. Please keep the dressing clean, dry, and in place. Do not take it off and do not get it wet.    2. Please keep the right arm and hand elevated for the 1st 24-48 hours to prevent swelling    3. Flexion and extension of the exposed fingers is encouraged, but do not attempt to push off or lift more than 1-2 pounds with right arm or hand    4. Please limit weightbearing to the right hand to 1-2 pounds. Light activity such as brushing your teeth, using a fork, or lifting a small drink is allowed starting today.    5. Pain medication has been prescribed. Please take them as necessary    6. If there are any questions or concerns please call Dr. Montanez's office at [phone]    7.  Follow up with Dr. Montanez in 2 weeks

## 2025-01-16 NOTE — OP NOTE
Chad Chen  1980    DATE OF SURGERY: 1/16/2025     PRE-OPERATIVE DIAGNOSIS: right Carpal Tunnel Syndrome    POST-OPERATIVE DIAGNOSIS: right Carpal Tunnel Syndrome     ANESTHESIA TYPE: Local    BLOOD LOSS:  Less than 10 cc    TOURNIQUET TIME:  9 minutes    SURGEON: Dr. Montanez    ASSISTANT:  Marlene Purvis    PROCEDURE: right Carpal Tunnel Release    IMPLANTS: None    SPECIMENS: None    INDICATION:     Mr. Chen presented to my clinic with a history of right carpal tunnel symptoms. Conservative treatments were initially tried. The patient did have relief with attempts at conservative treatment however has had a return of symptoms. An EMG and nerve conduction study has been performed. That study has shown compression of the median nerve at the wrist consistent with carpal tunnel syndrome. A discussion of the risks and benefits of carpal tunnel release has been performed, and informed consent for the procedure has been obtained.    PROCEDURE IN DETAIL:     Mr. Chen was transported to the operating room and was placed supine on the operating room table. All appropriate points were padded. The right hand and arm was prepped and draped in the normal sterile fashion. Time out was called. The correct patient, correct operative site, correct procedure, antibiotic administration which consisted of 2 g of Ancef, and allergies to medications which are to Aspirin, Inderal [propranolol], Lisinopril, and Nsaids (non-steroidal anti-inflammatory drug)  were reviewed. Time in was then called.     Attention was turned to right hand where a 3 cm incision was made in the palm of the hand. This was carried through the skin and subcutaneous tissues were dissected bluntly. The superficial palmar fascia was identified and was split in line with its fibers. The transverse carpal ligament was then identified and was incised for a distance of approximately 1 cm sharply.The median nerve was visualized and a carpal tunnel sled was placed  [place] [facility]        NAME: Uri Watson is a 23 y.o. female  : 2003    MRN: [id number]  DATE: 2023  TIME: 7:11 PM    Assessment and Plan   Allergic reaction, initial encounter [T78.40XA]  1. Allergic reaction, initial encounter  methylPREDNISolone sodium succinate (Solu-MEDROL) injection 125 mg            Patient Instructions     Allergic reaction  Steroids given in the office  Patient referred to the emergency room  Declined ambulance transfer and will call with friends via car  Follow up with PCP in 3-5 days. Proceed to  ER if symptoms worsen. Chief Complaint     Chief Complaint   Patient presents with   • Allergic Reaction     Pt. Feeling throat tightness after accidentally ingesting nuts. History of Present Illness       78-year-old female who is allergic to peanuts and accidentally ate something with peanuts and had presents complaining of feeling tight. Patient states she took Benadryl prior to arrival but still feeling a little tight. Patient did not feel the need to use her EpiPen. Review of Systems   Review of Systems   Constitutional: Negative. HENT: Negative. Eyes: Negative. Respiratory: Positive for chest tightness. Negative for cough, shortness of breath, wheezing and stridor. Cardiovascular: Negative. Negative for chest pain, palpitations and leg swelling.          Current Medications       Current Outpatient Medications:   •  fexofenadine (ALLEGRA) 180 MG tablet, Take 1 tablet (180 mg total) by mouth daily, Disp: , Rfl:   •  diphenhydrAMINE (BENADRYL) 25 mg capsule, Take 25 mg by mouth every 6 (six) hours as needed, Disp: , Rfl:   •  drospirenone-ethinyl estradiol (TORY) 3-0.02 MG per tablet, Take 1 tablet by mouth daily (Patient not taking: Reported on 2023), Disp: , Rfl:   •  EPINEPHrine (EPIPEN) 0.3 mg/0.3 mL SOAJ, As directed, Disp: , Rfl:   •  fluticasone (VERAMYST) 27.5 MCG/SPRAY nasal spray, 2 sprays into each nostril daily (Patient not taking: Reported on 9/6/2023), Disp: , Rfl:   •  levocetirizine (XYZAL) 5 MG tablet, Take 1 tablet (5 mg total) by mouth every evening As needed (Patient not taking: Reported on 9/6/2023), Disp: 1 tablet, Rfl:   •  Salicylic Acid ([employer] Plantar Wart Remover) 40 % PADS, Cleanse right foot plantar wart two times daily. (Patient not taking: Reported on 5/26/2022), Disp: 24 each, Rfl: 1    Current Facility-Administered Medications:   •  methylPREDNISolone sodium succinate (Solu-MEDROL) injection 125 mg, 125 mg, Intramuscular, Once, Benitez Fay PA-C    Current Allergies     Allergies as of 09/06/2023 - Reviewed 09/06/2023   Allergen Reaction Noted   • Treenut [nuts - food allergy] Anaphylaxis 07/26/2021            The following portions of the patient's history were reviewed and updated as appropriate: allergies, current medications, past family history, past medical history, past social history, past surgical history and problem list.     Past Medical History:   Diagnosis Date   • Patient denies medical problems        No past surgical history on file. Family History   Problem Relation Age of Onset   • Asthma Mother    • Allergies Mother    • Allergies Father    • Diabetes Father          Medications have been verified. Objective   /94   Pulse 82   Temp 98.3 °F (36.8 °C)   Resp 18   Ht 5' 3" (1.6 m)   Wt 86.2 kg (190 lb)   SpO2 98%   BMI 33.66 kg/m²        Physical Exam     Physical Exam  Constitutional:       Appearance: Normal appearance. She is well-developed. HENT:      Head: Normocephalic and atraumatic. Right Ear: External ear normal.      Left Ear: External ear normal.      Nose: Nose normal.      Mouth/Throat:      Mouth: Mucous membranes are moist.      Pharynx: Oropharynx is clear. No oropharyngeal exudate or posterior oropharyngeal erythema. Eyes:      Pupils: Pupils are equal, round, and reactive to light.    Cardiovascular:      Rate and Rhythm: Normal rate below the transverse carpal ligament but above the median nerve. Blunt dissection proximally over the transverse carpal ligament was performed and elevator was placed just above the transverse carpal ligament proximally. The ligament was identified. There were noted to be no penetrating nerve branches and at that point the carpal tunnel knife was used to incise the proximal transverse carpal ligament. Attention was then turned to the distal portion of the transverse carpal ligament. The carpal tunnel sled was again placed underneath the transverse carpal ligament but above the median nerve distally. Blunt dissection above the transverse carpal ligament distally was performed and an elevator was placed. The distal portion of the transverse carpal ligament was visualized and there were noted to be no penetrating branches of the nerve. At that point, tenotomy scissors were used to transect the distal portion of the transverse carpal ligament under direct visualization. The median nerve was then visualized. There were noted to be no specific lesions of the nerve and all of its branches were noted to be intact. The wound was then irrigated copiously. The tourniquet was let down and meticulous hemostasis was obtained with bipolar cautery. The wound was closed with 4-0 nylon suture superficially. The wound was then dressed with Xeroform, 4 x 4's, cast padding and a 3 inch Ace wrap was placed.      The patient was stable in the operating room and was transported to the recovery room in stable condition. All lap, needle, sponge, and equipment counts were correct at the end of the case.    POST-OPERATIVE PLAN:     The patient will keep a soft dressing in place for two weeks at which time he will followup with me. The dressing will be taken down, and the sutures will be removed at that time. He is not to get the dressing wet or to take it off. He will have a 2 pound weight limit of the left upper extremity for a total of 4  and regular rhythm. Pulses: Normal pulses. Heart sounds: Normal heart sounds. Pulmonary:      Effort: Pulmonary effort is normal. No respiratory distress. Breath sounds: Normal breath sounds. No stridor. No wheezing or rales. Chest:      Chest wall: No tenderness. Musculoskeletal:      Cervical back: Normal range of motion and neck supple. Lymphadenopathy:      Cervical: No cervical adenopathy. Neurological:      Mental Status: She is alert.          Patient appears stable, breathing comfortably weeks.

## 2025-01-16 NOTE — DISCHARGE SUMMARY
[facility] - Surgery  Discharge Note  Short Stay    Procedure(s) (LRB):  Right carpal tunnel release (Right)      OUTCOME: Patient tolerated treatment/procedure well without complication and is now ready for discharge.    DISPOSITION: Home or Self Care    FINAL DIAGNOSIS:  Carpal tunnel syndrome of right wrist    FOLLOWUP: In clinic    DISCHARGE INSTRUCTIONS:    Discharge Procedure Orders   Diet general     Activity as tolerated     Keep surgical extremity elevated     Lifting restrictions   Order Comments: Please limit lifting with the right arm and hand to 2-3 lb     Leave dressing on - Keep it clean, dry, and intact until clinic visit     Call MD for:  temperature >100.4     Call MD for:  persistent nausea and vomiting     Call MD for:  severe uncontrolled pain     Call MD for:  difficulty breathing, headache or visual disturbances     Call MD for:  redness, tenderness, or signs of infection (pain, swelling, redness, odor or green/yellow discharge around incision site)     Call MD for:  hives     Call MD for:  persistent dizziness or light-headedness     Call MD for:  extreme fatigue        TIME SPENT ON DISCHARGE: 15 minutes

## 2025-01-17 VITALS
BODY MASS INDEX: 35.7 KG/M2 | WEIGHT: 255 LBS | RESPIRATION RATE: 18 BRPM | OXYGEN SATURATION: 97 % | DIASTOLIC BLOOD PRESSURE: 104 MMHG | SYSTOLIC BLOOD PRESSURE: 160 MMHG | HEART RATE: 61 BPM | HEIGHT: 71 IN | TEMPERATURE: 98 F

## 2025-01-19 ENCOUNTER — PATIENT MESSAGE (OUTPATIENT)
Dept: ORTHOPEDICS | Facility: CLINIC | Age: 45
End: 2025-01-19
Payer: COMMERCIAL

## 2025-01-23 ENCOUNTER — CLINICAL SUPPORT (OUTPATIENT)
Dept: REHABILITATION | Facility: HOSPITAL | Age: 45
End: 2025-01-23
Payer: COMMERCIAL

## 2025-01-23 DIAGNOSIS — R29.898 ANKLE WEAKNESS: ICD-10-CM

## 2025-01-23 DIAGNOSIS — M25.671 DECREASED RANGE OF MOTION OF RIGHT ANKLE: Primary | ICD-10-CM

## 2025-01-23 DIAGNOSIS — R26.89 IMPAIRED GAIT AND MOBILITY: ICD-10-CM

## 2025-01-23 PROCEDURE — 97110 THERAPEUTIC EXERCISES: CPT | Mod: PN

## 2025-01-23 PROCEDURE — 97140 MANUAL THERAPY 1/> REGIONS: CPT | Mod: PN

## 2025-01-23 PROCEDURE — 97112 NEUROMUSCULAR REEDUCATION: CPT | Mod: PN
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
## 2025-01-27 ENCOUNTER — CLINICAL SUPPORT (OUTPATIENT)
Dept: REHABILITATION | Facility: HOSPITAL | Age: 45
End: 2025-01-27
Payer: COMMERCIAL

## 2025-01-27 DIAGNOSIS — M25.671 DECREASED RANGE OF MOTION OF RIGHT ANKLE: Primary | ICD-10-CM

## 2025-01-27 DIAGNOSIS — R29.898 ANKLE WEAKNESS: ICD-10-CM

## 2025-01-27 DIAGNOSIS — R26.89 IMPAIRED GAIT AND MOBILITY: ICD-10-CM

## 2025-01-27 PROCEDURE — 97110 THERAPEUTIC EXERCISES: CPT | Mod: PN,CQ

## 2025-01-27 PROCEDURE — 97140 MANUAL THERAPY 1/> REGIONS: CPT | Mod: PN,CQ

## 2025-01-27 PROCEDURE — 97112 NEUROMUSCULAR REEDUCATION: CPT | Mod: PN,CQ

## 2025-01-27 NOTE — PROGRESS NOTES
[facility]   Physical Therapy Treatment Note      Name: Chad Chen  Clinic Number: [id number]    Therapy Diagnosis:   Encounter Diagnoses   Name Primary?    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   S/p Right Achilles Tendon Repair  Evaluation Date: 12/18/2024  Authorization Period Expiration: 12/17/2025  Plan of Care Expiration: 3/28/2025 or 20v post eval  Visit # (episodes) / Visits authorized: 5 / 25 + 4 on prior auth (POC 8/20)   2nd FOTO visit 5 - 1/13/2025  3rd FOTO visit 10  Progress Note Due: 1/18/2025  PTA: 1/5        Time In: 9:03  Time Out: 10:03  Total Billable Time: 60 minutes     Precautions: Asthma; HTN; Weight Bearing restrictions (see protocol)  Insurance: Payor: [employer] / Plan: [employer] OPEN ACCESS PLUS / Product Type: Commercial /     Subjective     Pt reports: took the last wedge out this morning from the boot, heel feels tight with walking but does not feel unnatural.    He was compliant with home exercise program.  Response to previous treatment: positive  Functional change: none reported    Pain: 3/10  Location:Right achilles; above heel      Objective      Objective Measures updated at progress report unless specified.     Treatment     1/23/2025 @10weeks/3days    DOS:11/11/2024  Procedure(s) (LRB):  REPAIR, TENDON, ACHILLES (Right)  EXCISION,JULIA DEFORMITY,CALCANEUS (Right)  RECESSION, MUSCLE, GASTROCNEMIUS OPEN (Right)    NOTE: Pt has Asperger's syndrome and Audio processing disorder / Heptaphobic (let him know he will be touched)    Chad received the treatments listed below:      Chad received therapeutic exercises to develop strength, ROM, and  "flexibility for 10 minutes including:  NMRE utilized to activate appropriate mm to improve balance, proprioception, stability and gait skills: 35 minutes - 100% WBAT Weight Bearing status     SEATED: footstool  Ankle circles, 30x ea direction  Ankle alphabet, 1 set  Ankle 4-way AROM, YTB, 30x ea (very easy so will do RTB next)  Toe extension / crunch, 30x  Gastroc stretch with strap 3 x 30"   Seated Heel Raises /Toe Raises, 30x ea  +Arch lifts, 20x5s  +Toe ext rolls, 5-4-3-2-1, 20x  Seated DF heel slides to gentle stretch 2 x 10 x 5" hold  Seated PF/DF, INV/EV, circles x 20 each of all directions - Daily [place] board today     TABLE:  Bridges (No boot) 2 x 10 x 5" holds      therapeutic activities to improve dynamic functional performance for 00 minutes, including:     Upright bike, seat 7(visible), L5 x 5 min (feet on top of straps - NOT PERFORMED     STAND:  +Weight shift: For/Back; Side to Side; Stagger Rocking 30x Bilateral   +Hip flexion: 5x alternate bilateral, 20 total  +Hip Abduction: 5x alternate Bilateral, 20 total  +Hip Extension: same  [place], 2 x 5 each, then x 10 with R, standing on L only - NOT PERFORMED      Manual tx: 8 minutes  Myofascial Release and Active Release Technique to Right gastroc  Spreading and to healed scar and regions parallel to incision; friction to parallel regions       ADD NEXT: Note Right wrist pain/ follow protocol below (surgery 1/16/2025)  DF mobility glides  Maybe: shuttle press (boot donned) (bilateral and single leg)  Yellow Theraband or Red Theraband  to 4-way AROM  FUTURE visit: once boot is off  Pre-Gait train: 1 min each (airex optional)  1. M-L weight shift   2. Stagger: Affected fleg in front: Heel contact -> Flat foot with KE (mid stance)  3. Stagger: Affected leg in back: Roll through -> push off   Dorsiflexion glides step  Squats  Single Leg Balance   Stand Heel Raises / Toe Raises  Soleus Heel Raises against wall  Pronation/Supination 1/2 foam  Forward Step Up / " Forward Step Down  Eccentric heel raises  Toe taps->March  Single Heel Raises  Single Leg squat  Impact: Trampoline              2->2: hops / fast step ups              1->1: Med-Lat ski / [employer] [place]u   Return to running: Walk jog program          File Link     Patient Education and Home Exercises       Education provided:   - daily HEP  - ice with increased swelling and edema  - contact surgeon with boot irritation    Written Home Exercises Provided: Patient instructed to cont prior HEP. Exercises were reviewed and Chad was able to demonstrate them prior to the end of the session.  Chad demonstrated good  understanding of the education provided. See EMR under Patient Instructions for exercises provided during therapy sessions    Assessment     Pt progressed to removing last lift in boot, ambulates comfortably with reduced heel height. Tolerated completed exercises very well without pain provocation. Given red resistance band for use at home with ankle 4-way, yellow was reported very easy. Utilized manual tx to reduce stiffness and trigger points in gastroc. Some scar tissue tightness present around proximal incision near musculotendinous junction which loosened some with manual techniques.. Continue to progress as tolerated.     Chad Is progressing well towards his goals.   Pt prognosis is Good.     Pt will continue to benefit from skilled outpatient physical therapy to address the deficits listed in the problem list box on initial evaluation, provide pt/family education and to maximize pt's level of independence in the home and community environment.     Pt's spiritual, cultural and educational needs considered and pt agreeable to plan of care and goals.     Anticipated barriers to physical therapy: compliance    Goals:   Short Term Goals: 5 weeks PROGRESS 1/27/2025  1. Pt will demonstrate compliance with HEP.  2. Pt will decrease swelling of ankle by .5 cm to reduce pain performing daily life functions.  3. Pt  will increase DF ROM by 10 degrees to improve gait.   4. Pt will be Weight Bearing at 100% in CAM boot.     Long Term Goals: 10 weeks   1. Pt will demonstrate independence with HEP.  2. Pt will increase Right ankle strength by 1/2 grade or more in order to increase WB tolerance.  3. Pt will improve FOTO function score by 25% to show improvement in condition and functional mobility.   4. Pt will fully Weight Bearing without boot with 1/10 pain level for a half hour of grocery shopping.    Plan     Continue PT as deemed per POC: currently hip and Right Lower Extremity strengthening; 100% Weight Bearing as tolerated with boot donned.    Daphne Gonzalez, PTA

## 2025-01-30 ENCOUNTER — CLINICAL SUPPORT (OUTPATIENT)
Dept: REHABILITATION | Facility: HOSPITAL | Age: 45
End: 2025-01-30
Payer: COMMERCIAL

## 2025-01-30 DIAGNOSIS — M25.671 DECREASED RANGE OF MOTION OF RIGHT ANKLE: Primary | ICD-10-CM

## 2025-01-30 DIAGNOSIS — R29.898 ANKLE WEAKNESS: ICD-10-CM

## 2025-01-30 DIAGNOSIS — R26.89 IMPAIRED GAIT AND MOBILITY: ICD-10-CM

## 2025-01-30 PROCEDURE — 97112 NEUROMUSCULAR REEDUCATION: CPT | Mod: PN

## 2025-01-30 PROCEDURE — 97530 THERAPEUTIC ACTIVITIES: CPT | Mod: PN

## 2025-01-30 PROCEDURE — 97110 THERAPEUTIC EXERCISES: CPT | Mod: PN

## 2025-01-30 NOTE — PROGRESS NOTES
[facility]   Physical Therapy Treatment Note      Name: Chad Chen  [facility] Number: [id number]    Therapy Diagnosis:   Encounter Diagnoses   Name Primary?    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   S/p Right Achilles Tendon Repair  Evaluation Date: 12/18/2024  Authorization Period Expiration: 12/17/2025  Plan of Care Expiration: 3/28/2025 or 20v post eval  Visit # (episodes) / Visits authorized: 10 / 25 + 4 on prior auth (POC 9/20)   2nd FOTO visit 5 - 1/13/2025  3rd FOTO visit 10  Progress Note Due: 1/18/2025  PTA: 1/5        Time In: 900  Time Out: 955  Total Billable Time: 55 minutes     Precautions: Asthma; HTN; Weight Bearing restrictions (see protocol)  Insurance: Payor: [employer] / Plan: [employer] OPEN ACCESS PLUS / Product Type: Commercial /     Subjective     Pt reports: slightly more sore in incision region.     He was compliant with home exercise program.  Response to previous treatment: positive  Functional change: none reported    Pain: 3/10  Location:Right achilles; above heel      Objective      Objective Measures updated at progress report unless specified.     Treatment     1/30/2025 @11weeks/3days    DOS:11/11/2024  Procedure(s) (LRB):  REPAIR, TENDON, ACHILLES (Right)  EXCISION,JULIA DEFORMITY,CALCANEUS (Right)  RECESSION, MUSCLE, GASTROCNEMIUS OPEN (Right)    NOTE: Pt has Asperger's syndrome and Audio processing disorder / Heptaphobic (let him know he will be touched)    Chad received the treatments listed below:      Chad received therapeutic exercises to develop strength, ROM, and flexibility for 10 minutes including:  NMRE utilized to activate  "appropriate mm to improve balance, proprioception, stability and gait skills: 30 minutes - 100% WBAT Weight Bearing status    12 week next: Focus on weaning off of boot; see standing protocol below     SEATED: footstool  Ankle circles, 30x ea direction  Ankle alphabet, 1 set  Ankle 4-way AROM, GTB, 30x ea   Toe extension / crunch, 30x  Gastroc stretch with strap 3 x 30"   Seated Heel Raises /Toe Raises, 30x ea  Arch lifts, 20x5s  Toe ext rolls, 5-4-3-2-1, 20x  Seated DF heel slides to gentle stretch 2 x 10 x 5" hold  Seated PF/DF, INV/EV, circles x 20 each of all directions - [place] board today     TABLE:  Bridges (No boot) 2 x 15, 3 sec      therapeutic activities to improve dynamic functional performance for 10 minutes, including:     Upright bike, seat 7(visible), L5 x 5 min (feet on top of straps - NOT PERFORMED     STAND:  [place], 3x10    NOT PERFORMED   Weight shift: For/Back; Side to Side; Stagger Rocking 30x Bilateral   Hip flexion: 5x alternate bilateral, 20 total  Hip Abduction: 5x alternate Bilateral, 20 total  Hip Extension: same     Manual tx: 5 minutes  Myofascial Release and Active Release Technique to Right gastroc  Spreading and to healed scar and regions parallel to incision; friction to parallel regions       ADD NEXT: Note Right wrist pain/ follow protocol below (surgery 1/16/2025)  DF mobility glides  FUTURE visit: once boot is off  Pre-Gait train: 1 min each (airex optional)  1. M-L weight shift   2. Stagger: Affected fleg in front: Heel contact -> Flat foot with KE (mid stance)  3. Stagger: Affected leg in back: Roll through -> push off   Dorsiflexion glides step  Squats  Single Leg Balance   Stand Heel Raises / Toe Raises  Soleus Heel Raises against wall  Pronation/Supination 1/2 foam  Forward Step Up / Forward Step Down  Eccentric heel raises  Toe taps->March  Single Heel Raises  Single Leg squat  Impact: Trampoline              2->2: hops / fast step ups              1->1: Med-Lat ski / " [employer] [place]u   Return to running: Walk jog program          File Link     Patient Education and Home Exercises       Education provided:   - daily HEP  - ice with increased swelling and edema  - contact surgeon with boot irritation    Written Home Exercises Provided: Patient instructed to cont prior HEP. Exercises were reviewed and Chad was able to demonstrate them prior to the end of the session.  Chad demonstrated good  understanding of the education provided. See EMR under Patient Instructions for exercises provided during therapy sessions    Assessment     Pt demonstrates same levels of pain as activity increases, decreased right ankle Range of Motion, weakness, and balance and gait deficits. Pt able to perform all therex given with minimal pn provocation. Reviewed seated ankle protocol to improve Range of Motion, flexibility, and Weight Bearing in seated position.  Pt ankle noted to have good mobility and decreased myofascial restrictions. Able to progress pt to green theraband resistance for strengthening. Continue to progress as tolerated.     Chad Is progressing well towards his goals.   Pt prognosis is Good.     Pt will continue to benefit from skilled outpatient physical therapy to address the deficits listed in the problem list box on initial evaluation, provide pt/family education and to maximize pt's level of independence in the home and community environment.     Pt's spiritual, cultural and educational needs considered and pt agreeable to plan of care and goals.     Anticipated barriers to physical therapy: compliance    Goals:   Short Term Goals: 5 weeks PROGRESS 1/30/2025  1. Pt will demonstrate compliance with HEP.  2. Pt will decrease swelling of ankle by .5 cm to reduce pain performing daily life functions.  3. Pt will increase DF ROM by 10 degrees to improve gait.   4. Pt will be Weight Bearing at 100% in CAM boot.     Long Term Goals: 10 weeks   1. Pt will demonstrate independence with HEP.  2.  Pt will increase Right ankle strength by 1/2 grade or more in order to increase WB tolerance.  3. Pt will improve FOTO function score by 25% to show improvement in condition and functional mobility.   4. Pt will fully Weight Bearing without boot with 1/10 pain level for a half hour of grocery shopping.    Plan     Continue PT as deemed per POC: currently hip and Right Lower Extremity strengthening; 100% Weight Bearing as tolerated with boot donned.    Jamila Yuen, PT

## 2025-01-31 ENCOUNTER — OFFICE VISIT (OUTPATIENT)
Dept: ORTHOPEDICS | Facility: CLINIC | Age: 45
End: 2025-01-31
Payer: COMMERCIAL

## 2025-01-31 VITALS — HEIGHT: 71 IN | WEIGHT: 255.06 LBS | BODY MASS INDEX: 35.71 KG/M2

## 2025-01-31 DIAGNOSIS — G56.01 CARPAL TUNNEL SYNDROME OF RIGHT WRIST: Primary | ICD-10-CM

## 2025-01-31 PROCEDURE — 99999 PR PBB SHADOW E&M-EST. PATIENT-LVL III: CPT | Mod: PBBFAC,,, | Performed by: ORTHOPAEDIC SURGERY

## 2025-01-31 PROCEDURE — 1159F MED LIST DOCD IN RCRD: CPT | Mod: CPTII,S$GLB,, | Performed by: ORTHOPAEDIC SURGERY

## 2025-01-31 PROCEDURE — 99024 POSTOP FOLLOW-UP VISIT: CPT | Mod: S$GLB,,, | Performed by: ORTHOPAEDIC SURGERY

## 2025-02-03 ENCOUNTER — CLINICAL SUPPORT (OUTPATIENT)
Dept: REHABILITATION | Facility: HOSPITAL | Age: 45
End: 2025-02-03
Payer: COMMERCIAL

## 2025-02-03 DIAGNOSIS — M25.671 DECREASED RANGE OF MOTION OF RIGHT ANKLE: Primary | ICD-10-CM

## 2025-02-03 DIAGNOSIS — R26.89 IMPAIRED GAIT AND MOBILITY: ICD-10-CM

## 2025-02-03 DIAGNOSIS — R29.898 ANKLE WEAKNESS: ICD-10-CM

## 2025-02-03 PROCEDURE — 97530 THERAPEUTIC ACTIVITIES: CPT | Mod: PN,CQ

## 2025-02-03 PROCEDURE — 97112 NEUROMUSCULAR REEDUCATION: CPT | Mod: PN,CQ

## 2025-02-03 PROCEDURE — 97110 THERAPEUTIC EXERCISES: CPT | Mod: PN,CQ

## 2025-02-03 NOTE — PROGRESS NOTES
[facility]   Physical Therapy Treatment Note      Name: Chad Chen  Clinic Number: [id number]    Therapy Diagnosis:   Encounter Diagnoses   Name Primary?    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   S/p Right Achilles Tendon Repair  Evaluation Date: 12/18/2024  Authorization Period Expiration: 12/17/2025  Plan of Care Expiration: 3/28/2025 or 20v post eval  Visit # (episodes) / Visits authorized: 10 / 25 + 4 on prior auth (POC 9/20)   2nd FOTO visit 5 - 1/13/2025  3rd FOTO visit 10  Progress Note Due: 1/18/2025  PTA: 1/5        Time In: 9:00  Time Out: 10:00  Total Billable Time: 60 minutes     Precautions: Asthma; HTN; Weight Bearing restrictions (see protocol)  Insurance: Payor: [employer] / Plan: [employer] OPEN ACCESS PLUS / Product Type: Commercial /     Subjective     Pt reports: feeling really good, excited to be able to begin putting more weight through his foot without the boot donned. Verbalizes understanding to continue wear of boot while in community for next few days.    He was compliant with home exercise program.  Response to previous treatment: positive  Functional change: none reported    Pain: 1-2/10  Location:Right achilles; above heel      Objective      Objective Measures updated at progress report unless specified.     Treatment     1/30/2025 @11weeks/3days    DOS:11/11/2024  Procedure(s) (LRB):  REPAIR, TENDON, ACHILLES (Right)  EXCISION,JULIA DEFORMITY,CALCANEUS (Right)  RECESSION, MUSCLE, GASTROCNEMIUS OPEN (Right)    NOTE: Pt has Asperger's syndrome and Audio processing disorder / Heptaphobic (let him know he will be touched)    Chad received the  "treatments listed below:      Chad received therapeutic exercises to develop strength, ROM, and flexibility for 10 minutes including:  NMRE utilized to activate appropriate mm to improve balance, proprioception, stability and gait skills: 30 minutes - 100% WBAT Weight Bearing status with boot donned    12 week next: Focus on weaning off of boot; see standing protocol below     STAND:  +Standing DF mobility at 2nd step to gentle stretch 2 x 10 x 5" hold - cue for knee to push toward 3rd toe    SEATED: footstool  Ankle circles, 30x ea direction - NOT PERFORMED  Ankle alphabet, 1 set - NOT PERFORMED  Ankle 4-way AROM, GTB, 30x ea   Toe extension / crunch, 30x - NOT PERFORMED  Gastroc stretch with strap 3 x 30"   Seated Toe Raises, 30x  +Seated heel raise from 1/2 foam for greater ROM  Arch lifts, 20x5s  Toe ext rolls, 5-4-3-2-1, 20x  Seated PF/DF, INV/EV, circles x 20 each of all directions - [place] board today     TABLE:  Bridges (No boot) 2 x 15, 3 sec      therapeutic activities to improve dynamic functional performance for 15 minutes, including:     Upright bike, seat 7(visible), L5 x 5 min with both shoes donned  +Pre-Gait train: 1 min each (airex optional)  1. M-L weight shift   2. Stagger: Affected fleg in front: Heel contact -> Flat foot with KE (mid stance)  3. Stagger: Affected leg in back: Roll through -> push off   STAND:  [place], 3x10  +Ambulation with emphasis on pushoff through R foot  +Education on proper gait mechanics, continued use of boot in community, slow progression of ambulation distances to avoid over-straining and slowing healing process.    NOT PERFORMED   Weight shift: For/Back; Side to Side; Stagger Rocking 30x Bilateral   Hip flexion: 5x alternate bilateral, 20 total  Hip Abduction: 5x alternate Bilateral, 20 total  Hip Extension: same     Manual tx: 5 minutes  Myofascial Release and Active Release Technique to Right gastroc  Spreading and to healed scar and regions parallel to " incision; friction to parallel regions       ADD NEXT: Note Right wrist pain/ follow protocol below (surgery 1/16/2025)  DF mobility glides  FUTURE visit: once boot is off  Dorsiflexion glides step  Squats  Single Leg Balance   Stand Heel Raises / Toe Raises  Soleus Heel Raises against wall  Pronation/Supination 1/2 foam  Forward Step Up / Forward Step Down  Eccentric heel raises  Toe taps->March  Single Heel Raises  Single Leg squat  Impact: Trampoline              2->2: hops / fast step ups              1->1: Med-Lat ski / Ant-Post Bosu   Return to running: Walk jog program          File Link     Patient Education and Home Exercises       Education provided:   - daily HEP  - ice with increased swelling and edema  - contact surgeon with boot irritation    Written Home Exercises Provided: Patient instructed to cont prior HEP. Exercises were reviewed and Chad was able to demonstrate them prior to the end of the session.  Chad demonstrated good  understanding of the education provided. See EMR under Patient Instructions for exercises provided during therapy sessions    Assessment     Pt able to begin weight bearing activities out of boot today, heavy emphasis placed on slow wean from boot to avoid over-straining healing tissues. No pain or discomfort with any standing activities, cues given with ambulation for proper pushoff of right foot which will take time for improvement. Reiterated to patient to continue with boot for prolonged community ambulation and to begin very light walking at home out of boot, no more than 5 minutes continuous walking for now. Continue to progress as tolerated per protocol.     Chad Is progressing well towards his goals.   Pt prognosis is Good.     Pt will continue to benefit from skilled outpatient physical therapy to address the deficits listed in the problem list box on initial evaluation, provide pt/family education and to maximize pt's level of independence in the home and community  environment.     Pt's spiritual, cultural and educational needs considered and pt agreeable to plan of care and goals.     Anticipated barriers to physical therapy: compliance    Goals:   Short Term Goals: 5 weeks PROGRESS 2/3/2025  1. Pt will demonstrate compliance with HEP.  2. Pt will decrease swelling of ankle by .5 cm to reduce pain performing daily life functions.  3. Pt will increase DF ROM by 10 degrees to improve gait.   4. Pt will be Weight Bearing at 100% in CAM boot.     Long Term Goals: 10 weeks   1. Pt will demonstrate independence with HEP.  2. Pt will increase Right ankle strength by 1/2 grade or more in order to increase WB tolerance.  3. Pt will improve FOTO function score by 25% to show improvement in condition and functional mobility.   4. Pt will fully Weight Bearing without boot with 1/10 pain level for a half hour of grocery shopping.    Plan     Continue PT as deemed per POC: currently hip and Right Lower Extremity strengthening; 100% Weight Bearing as tolerated with boot donned.    Daphne Gonzalez, PTA

## 2025-02-04 NOTE — PROGRESS NOTES
Chad Chen is a 45 y.o. male who is approximately 2 weeks status post right carpal tunnel release. He is doing very well. He states that the majority of carpal tunnel symptoms are improved or resolved.    Physical exam: Examination of the right hand reveals that the incision is healing well. There is no significant edema,  erythema or drainage. Sensation is grossly intact median radial and ulnar distributions. Motor function of the thenar musculature is intact. Capillary refill less than 2 seconds in all of the digits. The Patient is able to make a full composite fist and fully extend the fingers without significant pain.    Assessment: Status post right carpal tunnel release    Plan:    1. Sutures were removed today and Steri-Strips are placed    2. Can begin hand washing in running water tomorrow    3. Continue a 2-3 pound weight limit to the arm and hand    4. Follow up with me in 2 weeks for repeat evaluation

## 2025-02-05 NOTE — PROGRESS NOTES
"  [facility]   Physical Therapy Treatment Note      Name: Chad Chen  Clinic Number: [id number]    Therapy Diagnosis:   Encounter Diagnoses   Name Primary?    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   S/p Right Achilles Tendon Repair  Evaluation Date: 12/18/2024  Authorization Period Expiration: 12/17/2025  Plan of Care Expiration: 3/28/2025 or 20v post eval  Visit # (episodes) / Visits authorized: 11 / 25 + 4 on prior auth (POC 10/20)   2nd FOTO visit 5 - 1/13/2025  3rd FOTO visit 10  Progress Note Due: 1/18/2025  PTA: 2/5        Time In: 9:07  Time Out: 10:03  Total Billable Time: 55 minutes     Precautions: Asthma; HTN; Weight Bearing restrictions (see protocol)  Insurance: Payor: [employer] / Plan: [employer] OPEN ACCESS PLUS / Product Type: Commercial /     Subjective     Pt reports: he has to be conscious of how he walks to minimize the "catch" in the heel when he walks but overall doing better. Feels good walking without the boot, has not worn it since last session.    He was compliant with home exercise program.  Response to previous treatment: positive  Functional change: none reported    Pain: 1-2/10  Location:Right achilles; above heel      Objective      Objective Measures updated at progress report unless specified.     Treatment     1/30/2025 @11weeks/3days    DOS:11/11/2024  Procedure(s) (LRB):  REPAIR, TENDON, ACHILLES (Right)  EXCISION,JULIA DEFORMITY,CALCANEUS (Right)  RECESSION, MUSCLE, GASTROCNEMIUS OPEN (Right)    NOTE: Pt has Asperger's syndrome and Audio processing disorder / Heptaphobic (let him know he will be touched)    Chad received the treatments listed " "below:      Chad received therapeutic exercises to develop strength, ROM, and flexibility for 10 minutes including:  NMRE utilized to activate appropriate mm to improve balance, proprioception, stability and gait skills: 30 minutes - 100% WBAT Weight Bearing status with boot donned    12 week next: Focus on weaning off of boot; see standing protocol below     STAND:  +Standing DF mobility at 2nd step to gentle stretch 2 x 10 x 5" hold - cue for knee to push toward 3rd toe  Standing single limb balance 2 x 30" each    SEATED: footstool  Ankle circles, 30x ea direction - NOT PERFORMED  Ankle alphabet, 1 set - NOT PERFORMED  Ankle 4-way AROM, GTB, 30x ea   Toe extension / crunch, 30x - NOT PERFORMED  Gastroc stretch with strap 3 x 30"   Seated Toe Raises, 30x  +Seated heel raise from 1/2 foam for greater ROM x 30  Arch lifts, 20x5s  Toe ext rolls, 5-4-3-2-1, 20x  Seated PF/DF, INV/EV, circles x 20 each of all directions - [employer] board today     TABLE:  Bridges (No boot) 2 x 10, 3 sec  +Single leg bridge x 10 each, 3 sec hold (has been doing       therapeutic activities to improve dynamic functional performance for 10 minutes, including:     Upright bike, seat 7(visible), L5 x 5 min with both shoes donned  Pre-Gait train: 1 min each (airex optional)  1. M-L weight shift  - NOT PERFORMED  2. Stagger: Affected fleg in front: Heel contact -> Flat foot with KE (mid stance)  3. Stagger: Affected leg in back: Roll through -> push off   STAND:  [place], 3x10  +sole to sole rocking on half foams x20-30  Ambulation with emphasis on pushoff through R foot  Education on proper gait mechanics, continued use of boot in community, slow progression of ambulation distances to avoid over-straining and slowing healing process.       Manual tx: 5 minutes  Myofascial Release and Active Release Technique to Right gastroc  Spreading and to healed scar and regions parallel to incision; friction to parallel regions       ADD NEXT: Note Right " wrist pain/ follow protocol below (surgery 1/16/2025)    FUTURE visit: once boot is off  Dorsiflexion glides step  Squats  Single Leg Balance   Stand Heel Raises / Toe Raises  Soleus Heel Raises against wall  Pronation/Supination 1/2 foam  Forward Step Up / Forward Step Down  Eccentric heel raises  Toe taps->March  Single Heel Raises  Single Leg squat  Impact: Trampoline              2->2: hops / fast step ups              1->1: Med-Lat ski / Ant-Post Bosu   Return to running: Walk jog program          File Link     Patient Education and Home Exercises       Education provided:   - daily HEP  - ice with increased swelling and edema  - contact surgeon with boot irritation    Written Home Exercises Provided: Patient instructed to cont prior HEP. Exercises were reviewed and Chad was able to demonstrate them prior to the end of the session.  Chad demonstrated good  understanding of the education provided. See EMR under Patient Instructions for exercises provided during therapy sessions    Assessment     Pt improving with symptoms to the right achilles heel, incision healed with small dry patch to superior incision possibly from friction to heel of shoe. Progressing well with closed chain ankle dorsiflexion and tolerance to weight bearing activities. Progressed to single limb bridges as patient has already been working on these at home. Patient also able to perform single limb balance with good mechanics and balance strategies. Gait mechanics improving, does require some increased concentration to maintain mechanics.  Continue to progress as tolerated per protocol.     Chad Is progressing well towards his goals.   Pt prognosis is Good.     Pt will continue to benefit from skilled outpatient physical therapy to address the deficits listed in the problem list box on initial evaluation, provide pt/family education and to maximize pt's level of independence in the home and community environment.     Pt's spiritual, cultural and  educational needs considered and pt agreeable to plan of care and goals.     Anticipated barriers to physical therapy: compliance    Goals:   Short Term Goals: 5 weeks PROGRESS 2/6/2025  1. Pt will demonstrate compliance with HEP.  2. Pt will decrease swelling of ankle by .5 cm to reduce pain performing daily life functions.  3. Pt will increase DF ROM by 10 degrees to improve gait.   4. Pt will be Weight Bearing at 100% in CAM boot.     Long Term Goals: 10 weeks   1. Pt will demonstrate independence with HEP.  2. Pt will increase Right ankle strength by 1/2 grade or more in order to increase WB tolerance.  3. Pt will improve FOTO function score by 25% to show improvement in condition and functional mobility.   4. Pt will fully Weight Bearing without boot with 1/10 pain level for a half hour of grocery shopping.    Plan     Continue PT as deemed per POC: currently hip and Right Lower Extremity strengthening; 100% Weight Bearing as tolerated with boot donned.    Daphne Gonzalez, PTA

## 2025-02-06 ENCOUNTER — CLINICAL SUPPORT (OUTPATIENT)
Dept: REHABILITATION | Facility: HOSPITAL | Age: 45
End: 2025-02-06
Payer: COMMERCIAL

## 2025-02-06 DIAGNOSIS — R26.89 IMPAIRED GAIT AND MOBILITY: ICD-10-CM

## 2025-02-06 DIAGNOSIS — M25.671 DECREASED RANGE OF MOTION OF RIGHT ANKLE: Primary | ICD-10-CM

## 2025-02-06 DIAGNOSIS — R29.898 ANKLE WEAKNESS: ICD-10-CM

## 2025-02-06 PROCEDURE — 97530 THERAPEUTIC ACTIVITIES: CPT | Mod: PN,CQ

## 2025-02-06 PROCEDURE — 97110 THERAPEUTIC EXERCISES: CPT | Mod: PN,CQ

## 2025-02-06 PROCEDURE — 97112 NEUROMUSCULAR REEDUCATION: CPT | Mod: PN,CQ

## 2025-02-10 ENCOUNTER — CLINICAL SUPPORT (OUTPATIENT)
Dept: REHABILITATION | Facility: HOSPITAL | Age: 45
End: 2025-02-10
Payer: COMMERCIAL

## 2025-02-10 DIAGNOSIS — R29.898 ANKLE WEAKNESS: ICD-10-CM

## 2025-02-10 DIAGNOSIS — R26.89 IMPAIRED GAIT AND MOBILITY: ICD-10-CM

## 2025-02-10 DIAGNOSIS — M25.671 DECREASED RANGE OF MOTION OF RIGHT ANKLE: Primary | ICD-10-CM

## 2025-02-10 PROCEDURE — 97530 THERAPEUTIC ACTIVITIES: CPT | Mod: PN,CQ

## 2025-02-10 PROCEDURE — 97112 NEUROMUSCULAR REEDUCATION: CPT | Mod: PN,CQ

## 2025-02-10 PROCEDURE — 97110 THERAPEUTIC EXERCISES: CPT | Mod: PN,CQ

## 2025-02-10 NOTE — PROGRESS NOTES
[facility]   Physical Therapy Treatment Note      Name: Chad Chen  Clinic Number: [id number]    Therapy Diagnosis:   Encounter Diagnoses   Name Primary?    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   S/p Right Achilles Tendon Repair  Evaluation Date: 12/18/2024  Authorization Period Expiration: 12/17/2025  Plan of Care Expiration: 3/28/2025 or 20v post eval  Visit # (episodes) / Visits authorized: 11 / 25 + 4 on prior auth (POC 10/20)   2nd FOTO visit 5 - 1/13/2025  3rd FOTO visit 10  Progress Note Due: 1/18/2025  PTA: 2/5        Time In: 9:05  Time Out: 10:03  Total Billable Time: 58 minutes     Precautions: Asthma; HTN; Weight Bearing restrictions (see protocol)  Insurance: Payor: [employer] / Plan: [employer] OPEN ACCESS PLUS / Product Type: Commercial /     Subjective     Pt reports: doing better and better, not having any current pain. Has not worn the boot since we began to wean. Walking a mile with no issue but is very slow. Able to ride his recumbent trike up to 5 miles with no issue.  He was compliant with home exercise program.  Response to previous treatment: positive  Functional change: none reported    Pain: 1-2/10  Location:Right achilles; above heel      Objective      Objective Measures updated at progress report unless specified.     Treatment     1/30/2025 @11weeks/3days    DOS:11/11/2024  Procedure(s) (LRB):  REPAIR, TENDON, ACHILLES (Right)  EXCISION,JULIA DEFORMITY,CALCANEUS (Right)  RECESSION, MUSCLE, GASTROCNEMIUS OPEN (Right)    NOTE: Pt has Asperger's syndrome and Audio processing disorder / Heptaphobic (let him know he will be touched)    Chad received the  "treatments listed below:      Chad received therapeutic exercises to develop strength, ROM, and flexibility for 10 minutes including:  NMRE utilized to activate appropriate mm to improve balance, proprioception, stability and gait skills: 30 minutes - 100% WBAT Weight Bearing status with boot donned    12 week next: Focus on weaning off of boot; see standing protocol below     SEATED: footstool  Ankle 4-way AROM, GTB, 30x ea   Toe extension / crunch, 30x - NOT PERFORMED  Gastroc stretch with strap 3 x 30" - progressed to standing runner stretch at wall  Arch lifts, 20x5s  Toe ext rolls, 5-4-3-2-1, 20x  Seated on low mat PF/DF, INV/EV, circles x 20 each of all directions - BAPS L3 today (Try L4 next time)  Seated heel raise from 1/2 foam x 30    TABLE:  Bridges (No boot)  x 10, 3 sec  Single leg bridge 2 x 10 each, 3 sec hold (has been doing     STAND:  Standing single limb balance 2 x 30" each  +Standing single limb balance on airex x 30" each - very challenging on R  +Heel raise B - attempted but had discomfort to stopped immediately  +Toe raise B x30  Standing DF mobility at 2nd step to gentle stretch 2 x 10 x 5" hold - cue for knee to push toward 3rd toe  +Standing runner stretch at wall 2x30" each    therapeutic activities to improve dynamic functional performance for 10 minutes, including:     Upright bike, seat 7(visible), L5 x 5 min with both shoes donned  Pre-Gait train: 1 min each (airex optional)  1. M-L weight shift  - NOT PERFORMED  2. Stagger: Affected fleg in front: Heel contact -> Flat foot with KE (mid stance)  3. Stagger: Affected leg in back: Roll through -> push off   STAND:  [employer], 3x10  +sole to sole rocking on half foams (flat side down) x20-30  Ambulation with emphasis on pushoff through R foot  Education on proper gait mechanics, continued use of boot in community, slow progression of ambulation distances to avoid over-straining and slowing healing process.       Manual tx: 00 " minutes  Myofascial Release and Active Release Technique to Right gastroc  Spreading and to healed scar and regions parallel to incision; friction to parallel regions       ADD NEXT: Note Right wrist pain/ follow protocol below (surgery 1/16/2025)    FUTURE visit: once boot is off  Dorsiflexion glides step  Squats  Soleus Heel Raises against wall  Forward Step Up / Forward Step Down  Eccentric heel raises  Toe taps->March  Single Heel Raises  Single Leg squat  Impact: Trampoline              2->2: hops / fast step ups              1->1: Med-Lat ski / Ant-Post Bosu   Return to running: Walk jog program          File Link     Patient Education and Home Exercises       Education provided:   - daily HEP  - ice with increased swelling and edema  - contact surgeon with boot irritation    Written Home Exercises Provided: Patient instructed to cont prior HEP. Exercises were reviewed and Chad was able to demonstrate them prior to the end of the session.  Chad demonstrated good  understanding of the education provided. See EMR under Patient Instructions for exercises provided during therapy sessions    Assessment     Pt progressing very well with tolerance to closed chain ankle mobility. Challenged to perform standing heel raises bilaterally due to onset of achilles discomfort but could be due to fatigue from prior exercises. Incision healed and looking good. Overall strength improving, able to give heavier band for ankle 4-way at home. Performed single limb balance on foam with moderate challenge. Gait mechanics improving, does require some increased concentration to maintain mechanics.  Continue to progress as tolerated per protocol.     Chad Is progressing well towards his goals.   Pt prognosis is Good.     Pt will continue to benefit from skilled outpatient physical therapy to address the deficits listed in the problem list box on initial evaluation, provide pt/family education and to maximize pt's level of independence in  the home and community environment.     Pt's spiritual, cultural and educational needs considered and pt agreeable to plan of care and goals.     Anticipated barriers to physical therapy: compliance    Goals:   Short Term Goals: 5 weeks PROGRESS 2/10/2025  1. Pt will demonstrate compliance with HEP.  2. Pt will decrease swelling of ankle by .5 cm to reduce pain performing daily life functions.  3. Pt will increase DF ROM by 10 degrees to improve gait.   4. Pt will be Weight Bearing at 100% in CAM boot.     Long Term Goals: 10 weeks   1. Pt will demonstrate independence with HEP.  2. Pt will increase Right ankle strength by 1/2 grade or more in order to increase WB tolerance.  3. Pt will improve FOTO function score by 25% to show improvement in condition and functional mobility.   4. Pt will fully Weight Bearing without boot with 1/10 pain level for a half hour of grocery shopping.    Plan     Continue PT as deemed per POC: currently hip and Right Lower Extremity strengthening; 100% Weight Bearing as tolerated with boot donned.    Daphne Gonzalez, PTA

## 2025-02-11 ENCOUNTER — OFFICE VISIT (OUTPATIENT)
Dept: ORTHOPEDICS | Facility: CLINIC | Age: 45
End: 2025-02-11
Payer: COMMERCIAL

## 2025-02-11 VITALS — WEIGHT: 255.06 LBS | HEIGHT: 71 IN | BODY MASS INDEX: 35.71 KG/M2

## 2025-02-11 DIAGNOSIS — M76.60 INSERTIONAL ACHILLES TENDINOPATHY: Primary | ICD-10-CM

## 2025-02-11 PROCEDURE — 99024 POSTOP FOLLOW-UP VISIT: CPT | Mod: S$GLB,,, | Performed by: ORTHOPAEDIC SURGERY

## 2025-02-11 PROCEDURE — 1160F RVW MEDS BY RX/DR IN RCRD: CPT | Mod: CPTII,S$GLB,, | Performed by: ORTHOPAEDIC SURGERY

## 2025-02-11 PROCEDURE — 99999 PR PBB SHADOW E&M-EST. PATIENT-LVL III: CPT | Mod: PBBFAC,,, | Performed by: ORTHOPAEDIC SURGERY

## 2025-02-11 PROCEDURE — 1159F MED LIST DOCD IN RCRD: CPT | Mod: CPTII,S$GLB,, | Performed by: ORTHOPAEDIC SURGERY

## 2025-02-11 NOTE — PROGRESS NOTES
Status/Diagnosis: Right gastroc contracture and AICT.  Date of Surgery:   11/11/2024: Right achilles tendon debridement; Maurisio resection; Elenita.  Date of Injury: none  Return visit: 2 months  X-rays on Return: none      Present History:  Chad Chen is a 45 y.o. male who presents today via referral from Dr. Alex Thao.  Patient endorses an approximately 4 month history worsening right posterior heel pain.  Noticed a bump over the area of concern more recently.  Denies any history of injury or other inciting event.  Patient has minimal pain at rest, increased with weight-bearing and also pain due to irritation from mass effect.  Denies any numbness or tingling.  Recently seen by my partner, Dr. Thao, for evaluation and treatment.  Discussed the use of a heel lift, anti-inflammatories, and physical therapy.  Patient reports that he called to make his 1st appointment with PT however we will not be able to be seen for the next 3 weeks.  Was taking over-the-counter oral ibuprofen as needed for pain intermittently but with minimal relief.  Past medical history significant for hypertension and asthma.  Denies tobacco use.  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    POSTOP:  Patient returns today for routine postop visit.  Patient continues to progress well.  3/10 pain.  He has been full weight-bearing in normal shoe wear x1 week.  Working with physical therapy regularly.      Past Medical History:   Diagnosis Date    Asthma     exercise induced    Cardiac murmur     Dyslipidemia     Hypertension     Obesity     PONV (postoperative nausea and vomiting)     Seasonal and perennial allergic rhinitis        Past Surgical History:   Procedure Laterality Date    ADENOIDECTOMY  2004    CARPAL TUNNEL RELEASE Left 12/17/2020    Procedure: RELEASE, CARPAL TUNNEL;  Surgeon: Dre Montanez MD;  Location: [facility];  Service: Orthopedics;  Laterality: Left;  Procedure:  Left carpal tunnel release    Position:  Supine    Anesthesia:   General equipment:  Carpal tunnel Set    CARPAL TUNNEL RELEASE Right 1/16/2025    Procedure: Right carpal tunnel release;  Surgeon: Dre Montanez MD;  Location: [facility] OR;  Service: Orthopedics;  Laterality: Right;  Normal priority    EXCISION,JULIA DEFORMITY,CALCANEUS Right 11/11/2024    Procedure: EXCISION,JULIA DEFORMITY,CALCANEUS;  Surgeon: Lew Helton MD;  Location: [facility] OR;  Service: Orthopedics;  Laterality: Right;  make card please    LAPAROSCOPIC CHOLECYSTECTOMY  2010    laparoscopic removal gallstones    RECESSION, MUSCLE, GASTROCNEMIUS Right 11/11/2024    Procedure: RECESSION, MUSCLE, GASTROCNEMIUS OPEN;  Surgeon: Lew Helton MD;  Location: [facility] OR;  Service: Orthopedics;  Laterality: Right;  Make card please    REPAIR OF ACHILLES TENDON Right 11/11/2024    Procedure: REPAIR, TENDON, ACHILLES;  Surgeon: Lew Helton MD;  Location: [facility] OR;  Service: Orthopedics;  Laterality: Right;    TONSILLECTOMY  2004    corrective septoplasty same time.    TRIGGER FINGER RELEASE Right 3/31/2022    Procedure: Right middle finger trigger release;  Surgeon: Dre Montanez MD;  Location: [facility] OR;  Service: Orthopedics;  Laterality: Right;       Current Outpatient Medications   Medication Sig    amLODIPine (NORVASC) 5 MG tablet Take 1 tablet (5 mg total) by mouth once daily.    levocetirizine (XYZAL) 5 MG tablet Take 5 mg by mouth every evening.    oxyCODONE (ROXICODONE) 5 MG immediate release tablet Take 1 tablet (5 mg total) by mouth every 4 (four) hours as needed for Pain.    potassium chloride (KLOR-CON) 10 MEQ TbSR Take 1 tablet (10 mEq total) by mouth 2 (two) times daily.    PROAIR RESPICLICK 90 mcg/actuation inhaler Inhale 2 puffs into the lungs every 6 (six) hours as needed for Wheezing or Shortness of Breath.    sildenafiL (VIAGRA) 50 MG tablet Take 1 tablet by mouth daily as needed for erectile dysfunction.    valsartan (DIOVAN) 320 MG tablet Take 1 tablet (320 mg total) by mouth  once daily.    sumatriptan (IMITREX) 50 MG tablet Take 1 tablet (50 mg total) by mouth daily as needed for Migraine.     No current facility-administered medications for this visit.       Review of patient's allergies indicates:   Allergen Reactions    Aspirin     Inderal [propranolol] Hives    Lisinopril Other (See Comments)     cough    Nsaids (non-steroidal anti-inflammatory drug)      Tolerates celecoxib/celebrex       Family History   Problem Relation Name Age of Onset    Cancer Mother          triple breast cancer at 2 different times.     Breast cancer Mother      Heart disease Father          2V CABG at 45; 12 coronary stents.    Hyperlipidemia Father      Mental illness Father          MIKALA    Stroke Father          traumatic CVA    Vision loss Father          very poor vision    Cancer Maternal Grandfather           from melanoma at his 60's.    Early death Maternal Grandfather           early 60's of melanoma    Melanoma Maternal Grandfather      Diabetes Paternal Grandmother      Hypertension Paternal Grandfather      Diabetes Maternal Uncle      Hypertension Paternal Uncle         Social History     Socioeconomic History    Marital status:    Occupational History    Occupation:  for [employer].    Tobacco Use    Smoking status: Never    Smokeless tobacco: Never   Substance and Sexual Activity    Alcohol use: Not Currently     Comment: no alcohol for 8 months as of 3/31/22    Drug use: Yes     Types: Marijuana     Comment: Consumes     Social Drivers of Health     Financial Resource Strain: Low Risk  (2024)    Overall Financial Resource Strain (CARDIA)     Difficulty of Paying Living Expenses: Not hard at all   Food Insecurity: No Food Insecurity (2024)    Hunger Vital Sign     Worried About Running Out of Food in the Last Year: Never true     Ran Out of Food in the Last Year: Never true   Transportation Needs: No Transportation Needs (2024)    PRAPARE -  Transportation     Lack of Transportation (Medical): No     Lack of Transportation (Non-Medical): No   Physical Activity: Insufficiently Active (2/20/2024)    Exercise Vital Sign     Days of Exercise per Week: 3 days     Minutes of Exercise per Session: 40 min   Stress: Stress Concern Present (2/20/2024)    [employer] - Occupational Stress Questionnaire     Feeling of Stress : To some extent   Housing Stability: Low Risk  (2/20/2024)    Housing Stability Vital Sign     Unable to Pay for Housing in the Last Year: No     Number of Places Lived in the Last Year: 1     Unstable Housing in the Last Year: No       Physical exam:  There were no vitals filed for this visit.  Body mass index is 35.58 kg/m².  General: In no apparent distress; well developed and well nourished.  HEENT: normocephalic; atraumatic.  Cardiovascular: regular rate.  Respiratory: no increased work of breathing.  Musculoskeletal:   Gait:  Nonantalgic  Inspection:   Surgical sites with well-healed scars.  No residual swelling. No warmth or erythema.  No signs or symptoms of infection.  Good tension with Ramirez testing.  Symmetric resting ankle plantar flexion with the patient prone and knees flexed 90° -- approximately 20°.  Gross motor and sensory function intact.  Palpable pedal pulse.                   Imaging Studies/Outside documentation:  I have ordered/reviewed/interpreted the following images/outside documentation:  No new imaging today.        Assessment:  Chad Chen is a 45 y.o. male with Right gastroc contracture and AICT.     Plan:   Patient continues to do extremely well postop.  He may bear full weight normal shoe wear.  Continue physical therapy per protocol.  May renew as needed.    Follow up in 2 months for repeat evaluation, or sooner if needed.  Patient voiced understanding all questions were answered.       As noted previously, patient unable take oral aspirin for DVT prophylaxis.      This note was  created using voice recognition software and may contain grammatical errors.

## 2025-02-13 ENCOUNTER — CLINICAL SUPPORT (OUTPATIENT)
Dept: REHABILITATION | Facility: HOSPITAL | Age: 45
End: 2025-02-13
Payer: COMMERCIAL

## 2025-02-13 DIAGNOSIS — M25.671 DECREASED RANGE OF MOTION OF RIGHT ANKLE: Primary | ICD-10-CM

## 2025-02-13 DIAGNOSIS — R29.898 ANKLE WEAKNESS: ICD-10-CM

## 2025-02-13 DIAGNOSIS — R26.89 IMPAIRED GAIT AND MOBILITY: ICD-10-CM

## 2025-02-13 PROCEDURE — 97530 THERAPEUTIC ACTIVITIES: CPT | Mod: PN

## 2025-02-13 PROCEDURE — 97110 THERAPEUTIC EXERCISES: CPT | Mod: PN

## 2025-02-13 NOTE — PROGRESS NOTES
[facility]   Physical Therapy Treatment Note      Name: Chad Chen  Clinic Number: [id number]    Therapy Diagnosis:   Encounter Diagnoses   Name Primary?    Decreased range of motion of right ankle Yes    Ankle weakness     Impaired gait and mobility        Physician: Lew Helton MD    Visit Date: 2/13/2025    Physician Orders: PT Eval and Treat   Medical Diagnosis from Referral:   M76.60 (ICD-10-CM) - Insertional Achilles tendinopathy   Q79.8 (ICD-10-CM) - Congenital contracture of right gastrocnemius muscle   S/p Right Achilles Tendon Repair  Evaluation Date: 12/18/2024  Authorization Period Expiration: 12/17/2025  Plan of Care Expiration: 3/28/2025 or 20v post eval  Visit # (episodes) / Visits authorized: 14/ 20 (POC 13/20)   2nd FOTO visit 5 - 1/13/2025  3rd FOTO visit 10 - 2/13/2025  Progress Note Due: 1/18/2025  PTA: 2/5        Time In: 900  Time Out: 955  Total Billable Time: 55 minutes     Precautions: Asthma; HTN; Weight Bearing restrictions (see protocol)  Insurance: Payor: [employer] / Plan: [employer] OPEN ACCESS PLUS / Product Type: Commercial /     Subjective     Pt reports: progressing fast. No boot at all anymore. He does have more soreness but it's fine.    He was compliant with home exercise program.  Response to previous treatment: positive  Functional change: none reported    Pain: 4/10  Location:Right achilles; above heel      Objective      Objective Measures updated at progress report unless specified.     Treatment     2/13/2025 @13weeks/3days    DOS:11/11/2024  Procedure(s) (LRB):  REPAIR, TENDON, ACHILLES (Right)  EXCISION,JULIA DEFORMITY,CALCANEUS (Right)  RECESSION, MUSCLE, GASTROCNEMIUS OPEN (Right)    NOTE: Pt has Asperger's syndrome and Audio processing disorder / Heptaphobic (let him know he will be touched)    Chad received the treatments listed below:      Chad received therapeutic exercises to develop strength, ROM, and flexibility for 10 minutes  "including:  NMRE utilized to activate appropriate mm to improve balance, proprioception, stability and gait skills: 00 minutes     SEATED: footstool  Alphabet, 1 set  Toe extension / crunch, 30x   Gastroc stretch with strap 3 x 30" - progressed to standing runner stretch at wall    NOT PERFORMED   Ankle 4-way AROM, GTB, 30x ea   Arch lifts, 20x5s  Toe ext rolls, 5-4-3-2-1, 20x  Seated on low mat PF/DF, INV/EV, circles x 20 each of all directions - BAPS L3 today (Try L4 next time)  Seated heel raise from 1/2 foam x 30    TABLE: NOT PERFORMED   Bridges (No boot)  x 10, 3 sec  Single leg bridge 2 x 10 each, 3 sec hold (has been doing       therapeutic activities to improve dynamic functional performance for 45 minutes, including:     DF mobility on stair, stagger stance, 10x, [id number]  Squats, 20-30x  Heel Raises / Toe Raises, 3x10, small lifting - no problem  Pre-gait stagger: Mid stance and push off, 20x Bilateral   Standing single limb balance on airex x 30" each, [id number]  Pronate/Supinate 1/2 Foam Roller, 20-30x  March, 1 minute  Red Theraband lateral step, 2 sets  Forward Step Up, 6", 20-30x  FSD, allow heel to lift like going down stairs, 2", 2x10 - most difficult today    Ambulate with MIRROR, 3 trials, 72' each     NOT PERFORMED   Standing DF mobility at 2nd step to gentle stretch 2 x 10 x 5" hold - cue for knee to push toward 3rd toe  Standing runner stretch at wall 2x30" each  [place], 3x10  sole to sole rocking on half foams (flat side down) x20-30       Manual tx: 00 minutes  Myofascial Release and Active Release Technique to Right gastroc  Spreading and to healed scar and regions parallel to incision; friction to parallel regions       ADD NEXT: Note Right wrist pain/ follow protocol below (surgery 1/16/2025)  FUTURE visit: once boot is off  Soleus Heel Raises against wall  Red Theraband monster walks  Eccentric heel raises  Single Heel Raises  Single Leg squat  Impact: Trampoline              2->2: hops " / fast step ups              1->1: Med-Lat ski / Ant-Post Bosu   Return to running: Walk jog program          File Link     Patient Education and Home Exercises       Education provided:   - daily HEP  - ice with increased swelling and edema  - contact surgeon with boot irritation    Written Home Exercises Provided: Patient instructed to cont prior HEP. Exercises were reviewed and Chad was able to demonstrate them prior to the end of the session.  Chad demonstrated good  understanding of the education provided. See EMR under Patient Instructions for exercises provided during therapy sessions    Assessment     Pt progressing very well with tolerance to closed chain ankle mobility. Improved in performing standing heel raises bilaterally due to onset of achilles; alternated with Toe Raises to decrease discomfort. FSD most difficult and did report a pop but then had improved ankle mobility and less pain; incision intact. Instructed pt to ice post tx due to all of the progressions. Gait mechanics improving, focus on push off; have pt do in front of mirror for visual cueing. Continue to progress as tolerated per protocol.     Chad Is progressing well towards his goals.   Pt prognosis is Good.     Pt will continue to benefit from skilled outpatient physical therapy to address the deficits listed in the problem list box on initial evaluation, provide pt/family education and to maximize pt's level of independence in the home and community environment.     Pt's spiritual, cultural and educational needs considered and pt agreeable to plan of care and goals.     Anticipated barriers to physical therapy: compliance    Goals:   Short Term Goals: 5 weeks PROGRESS 2/13/2025  1. Pt will demonstrate compliance with HEP.  2. Pt will decrease swelling of ankle by .5 cm to reduce pain performing daily life functions.  3. Pt will increase DF ROM by 10 degrees to improve gait.   4. Pt will be Weight Bearing at 100% in CAM boot.     Long Term  Goals: 10 weeks   1. Pt will demonstrate independence with HEP.  2. Pt will increase Right ankle strength by 1/2 grade or more in order to increase WB tolerance.  3. Pt will improve FOTO function score by 25% to show improvement in condition and functional mobility.   4. Pt will fully Weight Bearing without boot with 1/10 pain level for a half hour of grocery shopping.    Plan     Continue PT as deemed per POC: currently hip and Right Lower Extremity strengthening; 100% Weight Bearing as tolerated with boot donned.    Jamila Yuen, PT

## 2025-02-16 ENCOUNTER — PATIENT MESSAGE (OUTPATIENT)
Dept: REHABILITATION | Facility: HOSPITAL | Age: 45
End: 2025-02-16
Payer: COMMERCIAL

## 2025-02-18 ENCOUNTER — PATIENT MESSAGE (OUTPATIENT)
Dept: FAMILY MEDICINE | Facility: CLINIC | Age: 45
End: 2025-02-18
Payer: COMMERCIAL

## 2025-02-19 ENCOUNTER — OFFICE VISIT (OUTPATIENT)
Dept: FAMILY MEDICINE | Facility: CLINIC | Age: 45
End: 2025-02-19
Payer: COMMERCIAL

## 2025-02-19 VITALS
SYSTOLIC BLOOD PRESSURE: 127 MMHG | DIASTOLIC BLOOD PRESSURE: 83 MMHG | TEMPERATURE: 97 F | BODY MASS INDEX: 36.11 KG/M2 | HEIGHT: 71 IN | WEIGHT: 257.94 LBS

## 2025-02-19 DIAGNOSIS — E29.1 HYPOGONADISM IN MALE: ICD-10-CM

## 2025-02-19 DIAGNOSIS — I10 ESSENTIAL HYPERTENSION: ICD-10-CM

## 2025-02-19 DIAGNOSIS — E87.6 HYPOKALEMIA: Primary | ICD-10-CM

## 2025-02-19 DIAGNOSIS — Z12.5 SCREENING FOR MALIGNANT NEOPLASM OF PROSTATE: ICD-10-CM

## 2025-02-19 DIAGNOSIS — R05.1 ACUTE COUGH: ICD-10-CM

## 2025-02-19 RX ORDER — BENZONATATE 200 MG/1
200 CAPSULE ORAL 3 TIMES DAILY PRN
Qty: 30 CAPSULE | Refills: 0 | Status: SHIPPED | OUTPATIENT
Start: 2025-02-19 | End: 2025-03-01

## 2025-02-19 RX ORDER — CODEINE PHOSPHATE AND GUAIFENESIN 10; 100 MG/5ML; MG/5ML
5 SOLUTION ORAL 3 TIMES DAILY PRN
Qty: 118 ML | Refills: 0 | Status: SHIPPED | OUTPATIENT
Start: 2025-02-19 | End: 2025-03-01

## 2025-02-19 NOTE — PROGRESS NOTES
Assessment and Plan:    1. Hypokalemia (Primary)  - Basic Metabolic Panel; Future    2. Hypogonadism in male  Improved with exercise.  We will continue to monitor.  - Testosterone; Future    3. Essential hypertension  Controlled, continue current medications  - Basic Metabolic Panel; Future    4. Screening for malignant neoplasm of prostate  - PSA, Screening; Future    5. Acute cough  - benzonatate (TESSALON) 200 MG capsule; Take 1 capsule (200 mg total) by mouth 3 (three) times daily as needed.  Dispense: 30 capsule; Refill: 0  - guaiFENesin-codeine 100-10 mg/5 ml (TUSSI-ORGANIDIN NR)  mg/5 mL syrup; Take 5 mLs by mouth 3 (three) times daily as needed for Cough.  Dispense: 118 mL; Refill: 0      Visit today included increased complexity associated with the care of the episodic problems listed above, including addressing and managing the longitudinal care of the patient due to the serious and/or complex managed problem(s).    ______________________________________________________________________  Subjective:    Chief Complaint:  Follow up chronic medical conditions.    HPI:  Chad is a 45 y.o. year old male here to follow up chronic medical conditions.     He has had what he assumes to be a viral URI for the last few days. Still having sore throat, but overall symptoms are improving. Mainly bothered by cough and not being able to sleep due to the cough.    HTN- Controlled with valsartan 320 and amlodipine 5. Has been controlled at home. Averaging 115/70 at home.      Dyslipidemia- Not on medication specifically. Working on diet.     Testosterone- Low on last lab in Feb. Improved on recent labs.     ED- Using sildenafil PRN. Does not need a refill currently.     Hypokalemia- Taking 10 mEq BID. Recent lab with K 3.8.     Migraine- Prescribed imitrex. Typically only needs this after getting a vaccines. Does not need a refill.    Medications:  Medications Ordered Prior to Encounter[1]    Review of Systems:  Review  "of Systems   Respiratory:  Positive for cough. Negative for shortness of breath and wheezing.    Cardiovascular:  Negative for chest pain and palpitations.   Musculoskeletal:  Positive for neck pain.   Neurological:  Negative for headaches.     Entered by patient and reviewed and updated during visit      Past Medical History:  Past Medical History:   Diagnosis Date    Asthma     exercise induced    Cardiac murmur     Dyslipidemia     Hypertension     Obesity     PONV (postoperative nausea and vomiting)     Seasonal and perennial allergic rhinitis        Objective:    Vitals:  Vitals:    02/19/25 1057   BP: 127/83   Temp: 97.3 °F (36.3 °C)   Weight: 117 kg (257 lb 15 oz)   Height: 5' 11" (1.803 m)       Physical Exam  Constitutional:       General: He is not in acute distress.     Appearance: He is well-developed. He is not diaphoretic.   HENT:      Head: Normocephalic and atraumatic.   Pulmonary:      Effort: Pulmonary effort is normal. No respiratory distress.   Neurological:      Mental Status: He is alert and oriented to person, place, and time.   Psychiatric:         Behavior: Behavior normal.         Thought Content: Thought content normal.         Judgment: Judgment normal.         Data:  K 3.8      Bronwyn Brandt MD  Internal Medicine         [1]   Current Outpatient Medications on File Prior to Visit   Medication Sig Dispense Refill    amLODIPine (NORVASC) 5 MG tablet Take 1 tablet (5 mg total) by mouth once daily. 90 tablet 3    levocetirizine (XYZAL) 5 MG tablet Take 5 mg by mouth every evening.      potassium chloride (KLOR-CON) 10 MEQ TbSR Take 1 tablet (10 mEq total) by mouth 2 (two) times daily. 180 tablet 3    PROAIR RESPICLICK 90 mcg/actuation inhaler Inhale 2 puffs into the lungs every 6 (six) hours as needed for Wheezing or Shortness of Breath. 1 each 1    sildenafiL (VIAGRA) 50 MG tablet Take 1 tablet by mouth daily as needed for erectile dysfunction. 30 tablet 9    sumatriptan (IMITREX) 50 MG " tablet Take 1 tablet (50 mg total) by mouth daily as needed for Migraine. 9 tablet 1    valsartan (DIOVAN) 320 MG tablet Take 1 tablet (320 mg total) by mouth once daily. 90 tablet 3    [DISCONTINUED] oxyCODONE (ROXICODONE) 5 MG immediate release tablet Take 1 tablet (5 mg total) by mouth every 4 (four) hours as needed for Pain. 5 tablet 0     No current facility-administered medications on file prior to visit.

## 2025-02-20 ENCOUNTER — PATIENT MESSAGE (OUTPATIENT)
Dept: ORTHOPEDICS | Facility: CLINIC | Age: 45
End: 2025-02-20
Payer: COMMERCIAL

## 2025-02-20 ENCOUNTER — PATIENT MESSAGE (OUTPATIENT)
Dept: FAMILY MEDICINE | Facility: CLINIC | Age: 45
End: 2025-02-20
Payer: COMMERCIAL

## 2025-02-26 ENCOUNTER — OFFICE VISIT (OUTPATIENT)
Dept: ORTHOPEDICS | Facility: CLINIC | Age: 45
End: 2025-02-26
Payer: COMMERCIAL

## 2025-02-26 DIAGNOSIS — Z98.890 STATUS POST CARPAL TUNNEL RELEASE: Primary | ICD-10-CM

## 2025-02-26 PROCEDURE — 99999 PR PBB SHADOW E&M-EST. PATIENT-LVL III: CPT | Mod: PBBFAC,,, | Performed by: PHYSICIAN ASSISTANT

## 2025-02-26 NOTE — PROGRESS NOTES
Chad Chen is a 45 y.o. male who presents to clinic for follow up status post right carpal tunnel release.  He is about 6 weeks status post surgery.  States overall he is doing well.  States he has had complete resolution of his symptoms since his surgery.  States he does have some soreness around the surgical site with certain activities.  Denies any acute injuries since his last visit.  Denies any other complaints at this time.    Physical Exam:  Examination of the right hand/wrist reveals a well-healed surgical site.  Presence of scar tissue formation noted around the surgical site.  Presence of mild hypersensitivity around the surgical site.  Able make composite fist and fully extend all fingers.  Full intact range of motion of the right wrist.  5/5 /intrinsic strength.  Normal sensation in the radial, ulnar, median nerve distributions.  Capillary refill less than 2 seconds.    Radiology:  None.    Assessment:  Status post right carpal tunnel release    Plan:  1. I discussed with Chad Chen that he has progressed very well in the postoperative course we discussed the best course of action this time is to gradually return to activity as tolerated no restrictions and to begin gentle scar massage of the surgical site.  He verbally agreed with the treatment plan.      2.  He was written a note for work stating he is able to return to normal activity as tolerated no restrictions.      3. I would like him follow up in clinic on a p.r.n. basis.  He was instructed to contact the clinic for any problems or concerns.

## 2025-02-27 ENCOUNTER — CLINICAL SUPPORT (OUTPATIENT)
Dept: REHABILITATION | Facility: HOSPITAL | Age: 45
End: 2025-02-27
Payer: COMMERCIAL

## 2025-02-27 DIAGNOSIS — M25.671 DECREASED RANGE OF MOTION OF RIGHT ANKLE: Primary | ICD-10-CM

## 2025-02-27 DIAGNOSIS — R26.89 IMPAIRED GAIT AND MOBILITY: ICD-10-CM

## 2025-02-27 DIAGNOSIS — R29.898 ANKLE WEAKNESS: ICD-10-CM

## 2025-02-27 PROCEDURE — 97530 THERAPEUTIC ACTIVITIES: CPT | Mod: PN

## 2025-02-27 PROCEDURE — 97110 THERAPEUTIC EXERCISES: CPT | Mod: PN

## 2025-02-27 NOTE — PROGRESS NOTES
[facility]   Physical Therapy Treatment Note      Name: Chad Chen  Clinic Number: [id number]    Therapy Diagnosis:   Encounter Diagnoses   Name Primary?    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-10-CM) - Congenital contracture of right gastrocnemius muscle   S/p Right Achilles Tendon Repair  Evaluation Date: 12/18/2024  Authorization Period Expiration: 12/17/2025  Plan of Care Expiration: 3/28/2025 or 20v post eval  Visit # (episodes) / Visits authorized: 15/ 20 (POC 14/20)   2nd FOTO visit 5 - 1/13/2025  3rd FOTO visit 10 - 2/13/2025  Progress Note Due: 1/18/2025  PTA: 2/5        Time In: 900  Time Out: 955  Total Billable Time: 55 minutes     Precautions: Asthma; HTN; Weight Bearing restrictions (see protocol)  Insurance: Payor: [facility] / Plan: [employer] OPEN ACCESS PLUS / Product Type: Commercial /     Subjective     Pt reports: endurance is down since Covid. Ankle has been doing fine otherwise    He was compliant with home exercise program.  Response to previous treatment: positive  Functional change: none reported    Pain: 2/10  Location:Right achilles; above heel      Objective      Objective Measures updated at progress report unless specified.     97% O2  83 bpm    Treatment     2/13/2025 @13weeks/3days    DOS:11/11/2024  Procedure(s) (LRB):  REPAIR, TENDON, ACHILLES (Right)  EXCISION,JULIA DEFORMITY,CALCANEUS (Right)  RECESSION, MUSCLE, GASTROCNEMIUS OPEN (Right)    NOTE: Pt has Asperger's syndrome and Audio processing disorder / Heptaphobic (let him know he will be touched)    Chad received the treatments listed below:      Chad received therapeutic exercises to develop strength, ROM, and flexibility for 10 minutes  "including:  NMRE utilized to activate appropriate mm to improve balance, proprioception, stability and gait skills: 00 minutes     SEATED: footstool  Alphabet, 1 set  Toe extension / crunch, 30x   Gastroc stretch with strap 3 x 30" - progressed to standing runner stretch at wall  +DF mobility glides with strap, 30x    NOT PERFORMED   Ankle 4-way AROM, GTB, 30x ea   Arch lifts, 20x5s  Toe ext rolls, 5-4-3-2-1, 20x  Seated on low mat PF/DF, INV/EV, circles x 20 each of all directions - BAPS L3 today (Try L4 next time)  Seated heel raise from 1/2 foam x 30    TABLE: NOT PERFORMED   Bridges (No boot)  x 10, 3 sec  Single leg bridge 2 x 10 each, 3 sec hold (has been doing       therapeutic activities to improve dynamic functional performance for 45 minutes, including:     +Soleus heel raise against wall. 10x, 2-3 sets  DF mobility on stair, stagger stance, 10x, [id number]  Squats, 20-30x  Heel Raises / Toe Raises, 3x10, small lifting - no problem  Pre-gait stagger: Mid stance and push off, 20x Bilateral   Standing single limb balance on airex x 30" each, [id number]  Pronate/Supinate 1/2 Foam Roller, 20-30x  March, 1 minute  Red Theraband lateral step, 2 sets - NOT PERFORMED   Forward Step Up, 6", 20-30x  FSD, allow heel to lift like going down stairs, 4", 10x, 2-3 sets  Ambulate with MIRROR, 3 trials, 72' each - NOT PERFORMED     NOT PERFORMED   Standing DF mobility at 2nd step to gentle stretch 2 x 10 x 5" hold - cue for knee to push toward 3rd toe  Standing runner stretch at wall 2x30" each  [place], 3x10  sole to sole rocking on half foams (flat side down) x20-30       Manual tx: 00 minutes  Myofascial Release and Active Release Technique to Right gastroc  Spreading and to healed scar and regions parallel to incision; friction to parallel regions       ADD NEXT: Note Right wrist pain/ follow protocol below (surgery 1/16/2025)  FUTURE visit: once boot is off  Red Theraband monster walks  Eccentric heel raises  Single Heel " "Raises  Single Leg squat  Impact: Trampoline              2->2: hops / fast step ups              1->1: Med-Lat ski / Ant-Post Bosu   Return to running: Walk jog program          File Link     Patient Education and Home Exercises       Education provided:   - daily HEP  - ice with increased swelling and edema  - contact surgeon with boot irritation    Written Home Exercises Provided: Patient instructed to cont prior HEP. Exercises were reviewed and Chad was able to demonstrate them prior to the end of the session.  Chad demonstrated good  understanding of the education provided. See EMR under Patient Instructions for exercises provided during therapy sessions    Assessment     Pt progressing very well with tolerance to closed chain ankle mobility. Added soleus heel raises today. FSD has improved; able to perform 4" stool. Continue to progress as tolerated per protocol.     Chad Is progressing well towards his goals.   Pt prognosis is Good.     Pt will continue to benefit from skilled outpatient physical therapy to address the deficits listed in the problem list box on initial evaluation, provide pt/family education and to maximize pt's level of independence in the home and community environment.     Pt's spiritual, cultural and educational needs considered and pt agreeable to plan of care and goals.     Anticipated barriers to physical therapy: compliance    Goals:   Short Term Goals: 5 weeks PROGRESS 2/27/2025  1. Pt will demonstrate compliance with HEP.  2. Pt will decrease swelling of ankle by .5 cm to reduce pain performing daily life functions.  3. Pt will increase DF ROM by 10 degrees to improve gait.   4. Pt will be Weight Bearing at 100% in CAM boot.     Long Term Goals: 10 weeks   1. Pt will demonstrate independence with HEP.  2. Pt will increase Right ankle strength by 1/2 grade or more in order to increase WB tolerance.  3. Pt will improve FOTO function score by 25% to show improvement in condition and " functional mobility.   4. Pt will fully Weight Bearing without boot with 1/10 pain level for a half hour of grocery shopping.    Plan     Continue PT as deemed per POC: currently hip and Right Lower Extremity strengthening; 100% Weight Bearing as tolerated with boot donned.    Jamila Yuen, PT

## 2025-03-04 ENCOUNTER — PATIENT MESSAGE (OUTPATIENT)
Dept: FAMILY MEDICINE | Facility: CLINIC | Age: 45
End: 2025-03-04
Payer: COMMERCIAL

## 2025-03-04 DIAGNOSIS — R05.1 ACUTE COUGH: Primary | ICD-10-CM

## 2025-03-05 RX ORDER — BENZONATATE 200 MG/1
200 CAPSULE ORAL 3 TIMES DAILY PRN
Qty: 30 CAPSULE | Refills: 0 | Status: SHIPPED | OUTPATIENT
Start: 2025-03-05 | End: 2025-03-15

## 2025-03-06 ENCOUNTER — TELEPHONE (OUTPATIENT)
Dept: REHABILITATION | Facility: HOSPITAL | Age: 45
End: 2025-03-06
Payer: COMMERCIAL

## 2025-03-07 ENCOUNTER — PATIENT MESSAGE (OUTPATIENT)
Dept: REHABILITATION | Facility: HOSPITAL | Age: 45
End: 2025-03-07
Payer: COMMERCIAL

## 2025-04-10 ENCOUNTER — OFFICE VISIT (OUTPATIENT)
Dept: ORTHOPEDICS | Facility: CLINIC | Age: 45
End: 2025-04-10
Payer: COMMERCIAL

## 2025-04-10 VITALS — BODY MASS INDEX: 36.11 KG/M2 | WEIGHT: 257.94 LBS | HEIGHT: 71 IN

## 2025-04-10 DIAGNOSIS — M76.60 INSERTIONAL ACHILLES TENDINOPATHY: Primary | ICD-10-CM

## 2025-04-10 PROCEDURE — 1159F MED LIST DOCD IN RCRD: CPT | Mod: CPTII,S$GLB,, | Performed by: ORTHOPAEDIC SURGERY

## 2025-04-10 PROCEDURE — 3008F BODY MASS INDEX DOCD: CPT | Mod: CPTII,S$GLB,, | Performed by: ORTHOPAEDIC SURGERY

## 2025-04-10 PROCEDURE — 4010F ACE/ARB THERAPY RXD/TAKEN: CPT | Mod: CPTII,S$GLB,, | Performed by: ORTHOPAEDIC SURGERY

## 2025-04-10 PROCEDURE — 99999 PR PBB SHADOW E&M-EST. PATIENT-LVL III: CPT | Mod: PBBFAC,,, | Performed by: ORTHOPAEDIC SURGERY

## 2025-04-10 PROCEDURE — 99213 OFFICE O/P EST LOW 20 MIN: CPT | Mod: S$GLB,,, | Performed by: ORTHOPAEDIC SURGERY

## 2025-04-10 PROCEDURE — 1160F RVW MEDS BY RX/DR IN RCRD: CPT | Mod: CPTII,S$GLB,, | Performed by: ORTHOPAEDIC SURGERY

## 2025-04-10 NOTE — PROGRESS NOTES
Status/Diagnosis: Right gastroc contracture and AICT.  Date of Surgery:   11/11/2024: Right achilles tendon debridement; Maurisio resection; Elenita.  Date of Injury: none  Return visit: 12 months postop  X-rays on Return: none      Present History:  Chad Chen is a 45 y.o. male who presents today via referral from Dr. Alex Thao.  Patient endorses an approximately 4 month history worsening right posterior heel pain.  Noticed a bump over the area of concern more recently.  Denies any history of injury or other inciting event.  Patient has minimal pain at rest, increased with weight-bearing and also pain due to irritation from mass effect.  Denies any numbness or tingling.  Recently seen by my partner, Dr. Thao, for evaluation and treatment.  Discussed the use of a heel lift, anti-inflammatories, and physical therapy.  Patient reports that he called to make his 1st appointment with PT however we will not be able to be seen for the next 3 weeks.  Was taking over-the-counter oral ibuprofen as needed for pain intermittently but with minimal relief.  Past medical history significant for hypertension and asthma.  Denies tobacco use.  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    POSTOP:  Patient returns today for routine postop visit.  Continues to do well.  Full weight-bearing in sandals currently.  Completed physical therapy in late February.  He has been increasing his activity (has been mowing the grass, walked 2.5 miles for exercise yesterday, etc.)  Some residual prominence over the posterolateral heel with mild irritation on closed toed shoe wear.  Denies any drainage, fever, chills.      Past Medical History:   Diagnosis Date    Asthma     exercise induced    Cardiac murmur     Dyslipidemia     Hypertension     Obesity     PONV (postoperative nausea and vomiting)     Seasonal and perennial allergic rhinitis        Past Surgical History:   Procedure Laterality Date    ADENOIDECTOMY  2004    CARPAL TUNNEL RELEASE Left  12/17/2020    Procedure: RELEASE, CARPAL TUNNEL;  Surgeon: Dre Montanez MD;  Location: [facility] OR;  Service: Orthopedics;  Laterality: Left;  Procedure:  Left carpal tunnel release    Position:  Supine    Anesthesia:  General equipment:  Carpal tunnel Set    CARPAL TUNNEL RELEASE Right 1/16/2025    Procedure: Right carpal tunnel release;  Surgeon: Dre Montanez MD;  Location: [facility] OR;  Service: Orthopedics;  Laterality: Right;  Normal priority    EXCISION,JULIA DEFORMITY,CALCANEUS Right 11/11/2024    Procedure: EXCISION,JULIA DEFORMITY,CALCANEUS;  Surgeon: Lew Helton MD;  Location: [facility] OR;  Service: Orthopedics;  Laterality: Right;  make card please    LAPAROSCOPIC CHOLECYSTECTOMY  2010    laparoscopic removal gallstones    RECESSION, MUSCLE, GASTROCNEMIUS Right 11/11/2024    Procedure: RECESSION, MUSCLE, GASTROCNEMIUS OPEN;  Surgeon: Lew Helton MD;  Location: [facility] OR;  Service: Orthopedics;  Laterality: Right;  Make card please    REPAIR OF ACHILLES TENDON Right 11/11/2024    Procedure: REPAIR, TENDON, ACHILLES;  Surgeon: Lew Helton MD;  Location: [facility] OR;  Service: Orthopedics;  Laterality: Right;    TONSILLECTOMY  2004    corrective septoplasty same time.    TRIGGER FINGER RELEASE Right 3/31/2022    Procedure: Right middle finger trigger release;  Surgeon: Dre Montanez MD;  Location: [facility] OR;  Service: Orthopedics;  Laterality: Right;       Current Outpatient Medications   Medication Sig    amLODIPine (NORVASC) 5 MG tablet Take 1 tablet (5 mg total) by mouth once daily.    levocetirizine (XYZAL) 5 MG tablet Take 5 mg by mouth every evening.    potassium chloride (KLOR-CON) 10 MEQ TbSR Take 1 tablet (10 mEq total) by mouth 2 (two) times daily.    PROAIR RESPICLICK 90 mcg/actuation inhaler Inhale 2 puffs into the lungs every 6 (six) hours as needed for Wheezing or Shortness of Breath.    sildenafiL (VIAGRA) 50 MG tablet Take 1 tablet by mouth daily as needed for  erectile dysfunction.    sumatriptan (IMITREX) 50 MG tablet Take 1 tablet (50 mg total) by mouth daily as needed for Migraine.    valsartan (DIOVAN) 320 MG tablet Take 1 tablet (320 mg total) by mouth once daily.     No current facility-administered medications for this visit.       Review of patient's allergies indicates:   Allergen Reactions    Aspirin     Inderal [propranolol] Hives    Lisinopril Other (See Comments)     cough    Nsaids (non-steroidal anti-inflammatory drug)      Tolerates celecoxib/celebrex       Family History   Problem Relation Name Age of Onset    Cancer Mother          triple breast cancer at 2 different times.     Breast cancer Mother      Heart disease Father          2V CABG at 45; 12 coronary stents.    Hyperlipidemia Father      Mental illness Father          MIKALA    Stroke Father          traumatic CVA    Vision loss Father          very poor vision    Cancer Maternal Grandfather           from melanoma at his 60's.    Early death Maternal Grandfather           early 60's of melanoma    Melanoma Maternal Grandfather      Diabetes Paternal Grandmother      Hypertension Paternal Grandfather      Diabetes Maternal Uncle      Hypertension Paternal Uncle         Social History     Socioeconomic History    Marital status:    Occupational History    Occupation:  for sales force.    Tobacco Use    Smoking status: Never    Smokeless tobacco: Never   Substance and Sexual Activity    Alcohol use: Not Currently     Comment: no alcohol for 8 months as of 3/31/22    Drug use: Yes     Types: Marijuana     Comment: Consumes     Social Drivers of Health     Financial Resource Strain: Low Risk  (2025)    Overall Financial Resource Strain (CARDIA)     Difficulty of Paying Living Expenses: Not hard at all   Food Insecurity: No Food Insecurity (2025)    Hunger Vital Sign     Worried About Running Out of Food in the Last Year: Never true     Ran Out of Food in the  Last Year: Never true   Transportation Needs: No Transportation Needs (2/26/2025)    PRAPARE - Transportation     Lack of Transportation (Medical): No     Lack of Transportation (Non-Medical): No   Physical Activity: Sufficiently Active (2/26/2025)    Exercise Vital Sign     Days of Exercise per Week: 7 days     Minutes of Exercise per Session: 40 min   Stress: No Stress Concern Present (2/26/2025)    [place] [place] [employer] - Occupational Stress Questionnaire     Feeling of Stress : Only a little   Housing Stability: Low Risk  (2/26/2025)    Housing Stability Vital Sign     Unable to Pay for Housing in the Last Year: No     Number of Times Moved in the Last Year: 0     Homeless in the Last Year: No       Physical exam:  There were no vitals filed for this visit.  Body mass index is 35.98 kg/m².  General: In no apparent distress; well developed and well nourished.  HEENT: normocephalic; atraumatic.  Cardiovascular: regular rate.  Respiratory: no increased work of breathing.  Musculoskeletal:   Gait:  Nonantalgic  Inspection:   Surgical sites with well-healed scars.  Subtle residual fullness involving the posterolateral heel.  No residual swelling. No warmth or erythema.  No signs or symptoms of infection.  Good tension with Ramirez testing.  Symmetric resting ankle plantar flexion with the patient prone and knees flexed 90° -- approximately 20°.  Gross motor and sensory function intact.  Palpable pedal pulse.                   Imaging Studies/Outside documentation:  I have ordered/reviewed/interpreted the following images/outside documentation:  No new imaging today.        Assessment:  Chad Chen is a 45 y.o. male with Right gastroc contracture and AICT.     Plan:   Patient continues to do extremely well postop.   He may continue to bear full weight in good supportive shoes.    No running, jumping, etc. otherwise patient informed to let pain be his guide.    Follow up with a 12 month postop lamberto,  or sooner if needed.    Patient voiced understanding.  All questions were answered.      This note was created using voice recognition software and may contain grammatical errors.

## 2025-07-10 ENCOUNTER — PATIENT MESSAGE (OUTPATIENT)
Dept: FAMILY MEDICINE | Facility: CLINIC | Age: 45
End: 2025-07-10
Payer: COMMERCIAL

## 2025-07-11 DIAGNOSIS — E87.6 HYPOKALEMIA: ICD-10-CM

## 2025-07-11 DIAGNOSIS — I10 HYPERTENSION, UNSPECIFIED TYPE: ICD-10-CM

## 2025-07-11 RX ORDER — POTASSIUM CHLORIDE 750 MG/1
10 TABLET, EXTENDED RELEASE ORAL 2 TIMES DAILY
Qty: 180 TABLET | Refills: 1 | Status: SHIPPED | OUTPATIENT
Start: 2025-07-11

## 2025-07-11 RX ORDER — AMLODIPINE BESYLATE 5 MG/1
5 TABLET ORAL
Qty: 90 TABLET | Refills: 2 | Status: SHIPPED | OUTPATIENT
Start: 2025-07-11

## 2025-07-11 RX ORDER — VALSARTAN 320 MG/1
320 TABLET ORAL
Qty: 90 TABLET | Refills: 1 | Status: SHIPPED | OUTPATIENT
Start: 2025-07-11

## 2025-07-11 NOTE — TELEPHONE ENCOUNTER
No care due was identified.  [facility] Embedded Care Due Messages. Reference number: [id number].   7/11/2025 11:18:39 AM CDT

## 2025-07-12 NOTE — TELEPHONE ENCOUNTER
Refill Decision Note   Chad Chen  is requesting a refill authorization.  Brief Assessment and Rationale for Refill:  Approve     Medication Therapy Plan:        Comments:     Note composed:8:34 PM 07/11/2025

## 2025-07-18 ENCOUNTER — LAB VISIT (OUTPATIENT)
Dept: LAB | Facility: HOSPITAL | Age: 45
End: 2025-07-18
Attending: INTERNAL MEDICINE
Payer: COMMERCIAL

## 2025-07-18 DIAGNOSIS — Z12.5 SCREENING FOR MALIGNANT NEOPLASM OF PROSTATE: ICD-10-CM

## 2025-07-18 DIAGNOSIS — E87.6 HYPOKALEMIA: ICD-10-CM

## 2025-07-18 DIAGNOSIS — E29.1 HYPOGONADISM IN MALE: ICD-10-CM

## 2025-07-18 DIAGNOSIS — I10 ESSENTIAL HYPERTENSION: ICD-10-CM

## 2025-07-18 LAB
ANION GAP (OHS): 8 MMOL/L (ref 8–16)
BUN SERPL-MCNC: 14 MG/DL (ref 6–20)
CALCIUM SERPL-MCNC: 8.7 MG/DL (ref 8.7–10.5)
CHLORIDE SERPL-SCNC: 109 MMOL/L (ref 95–110)
CO2 SERPL-SCNC: 25 MMOL/L (ref 23–29)
CREAT SERPL-MCNC: 1 MG/DL (ref 0.5–1.4)
GFR SERPLBLD CREATININE-BSD FMLA CKD-EPI: >60 ML/MIN/1.73/M2
GLUCOSE SERPL-MCNC: 93 MG/DL (ref 70–110)
POTASSIUM SERPL-SCNC: 3.5 MMOL/L (ref 3.5–5.1)
PSA SERPL-MCNC: 0.43 NG/ML
SODIUM SERPL-SCNC: 142 MMOL/L (ref 136–145)
TESTOST SERPL-MCNC: 377 NG/DL (ref 304–1227)

## 2025-07-18 PROCEDURE — 84403 ASSAY OF TOTAL TESTOSTERONE: CPT

## 2025-07-18 PROCEDURE — 84153 ASSAY OF PSA TOTAL: CPT

## 2025-07-18 PROCEDURE — 80048 BASIC METABOLIC PNL TOTAL CA: CPT

## 2025-07-18 PROCEDURE — 36415 COLL VENOUS BLD VENIPUNCTURE: CPT | Mod: PN

## 2025-07-23 ENCOUNTER — OFFICE VISIT (OUTPATIENT)
Dept: FAMILY MEDICINE | Facility: CLINIC | Age: 45
End: 2025-07-23
Payer: COMMERCIAL

## 2025-07-23 VITALS
HEART RATE: 69 BPM | DIASTOLIC BLOOD PRESSURE: 75 MMHG | WEIGHT: 261.81 LBS | RESPIRATION RATE: 16 BRPM | OXYGEN SATURATION: 97 % | HEIGHT: 71 IN | TEMPERATURE: 98 F | BODY MASS INDEX: 36.65 KG/M2 | SYSTOLIC BLOOD PRESSURE: 115 MMHG

## 2025-07-23 DIAGNOSIS — I10 HYPERTENSION, UNSPECIFIED TYPE: ICD-10-CM

## 2025-07-23 PROCEDURE — 3008F BODY MASS INDEX DOCD: CPT | Mod: CPTII,S$GLB,, | Performed by: INTERNAL MEDICINE

## 2025-07-23 PROCEDURE — 1159F MED LIST DOCD IN RCRD: CPT | Mod: CPTII,S$GLB,, | Performed by: INTERNAL MEDICINE

## 2025-07-23 PROCEDURE — 3078F DIAST BP <80 MM HG: CPT | Mod: CPTII,S$GLB,, | Performed by: INTERNAL MEDICINE

## 2025-07-23 PROCEDURE — G2211 COMPLEX E/M VISIT ADD ON: HCPCS | Mod: S$GLB,,, | Performed by: INTERNAL MEDICINE

## 2025-07-23 PROCEDURE — 3074F SYST BP LT 130 MM HG: CPT | Mod: CPTII,S$GLB,, | Performed by: INTERNAL MEDICINE

## 2025-07-23 PROCEDURE — 4010F ACE/ARB THERAPY RXD/TAKEN: CPT | Mod: CPTII,S$GLB,, | Performed by: INTERNAL MEDICINE

## 2025-07-23 PROCEDURE — 1160F RVW MEDS BY RX/DR IN RCRD: CPT | Mod: CPTII,S$GLB,, | Performed by: INTERNAL MEDICINE

## 2025-07-23 PROCEDURE — 99999 PR PBB SHADOW E&M-EST. PATIENT-LVL IV: CPT | Mod: PBBFAC,,, | Performed by: INTERNAL MEDICINE

## 2025-07-23 PROCEDURE — 99214 OFFICE O/P EST MOD 30 MIN: CPT | Mod: S$GLB,,, | Performed by: INTERNAL MEDICINE

## 2025-07-23 NOTE — PROGRESS NOTES
Assessment and Plan:    1. Hypertension, unspecified type  Controlled, continue current medications. Labs UTD. Refills already sent.      Visit today included increased complexity associated with the care of the episodic problems listed above, including addressing and managing the longitudinal care of the patient due to the serious and/or complex managed problem(s).    ______________________________________________________________________  Subjective:    Chief Complaint:  Follow up chronic medical conditions.    HPI:  Chad is a 45 y.o. year old male here to follow up chronic medical conditions.     HTN- Controlled with valsartan 320 and amlodipine 5. Has been controlled at home. Averaging 115/75 at home.      Dyslipidemia- Not on medication specifically. Working on diet.     Testosterone- Previously low but has improved with increased exercise and efforts at weight loss.      ED- Using sildenafil PRN. Does not need a refill currently. Has not been needing this recently     Hypokalemia- Taking 10 mEq BID. Recent lab with K 3.5.     Migraine- Takes imitrex PTN, but typically only needs this after getting a vaccines. Does not need a refill.    Medications:  Medications Ordered Prior to Encounter[1]    Review of Systems:  Review of Systems   Constitutional:  Negative for chills and fever.   Respiratory:  Negative for shortness of breath and wheezing.    Cardiovascular:  Negative for chest pain and leg swelling.   Gastrointestinal:  Negative for diarrhea, nausea and vomiting.   Neurological:  Negative for dizziness, syncope and light-headedness.       Past Medical History:  Past Medical History:   Diagnosis Date    Asthma     exercise induced    Cardiac murmur     Dyslipidemia     Hypertension     Obesity     PONV (postoperative nausea and vomiting)     Seasonal and perennial allergic rhinitis        Objective:    Vitals:  Vitals:    07/23/25 1554 07/23/25 1612 07/23/25 1613   BP: (!) 154/86 (!) 146/80 115/75   Pulse: 69   "   Resp: 16     Temp: 97.8 °F (36.6 °C)     TempSrc: Oral     SpO2: 97%     Weight: 118.8 kg (261 lb 12.7 oz)     Height: 5' 11" (1.803 m)     PainSc: 0-No pain         Physical Exam  Vitals reviewed.   Constitutional:       General: He is not in acute distress.     Appearance: He is well-developed.   Eyes:      Conjunctiva/sclera: Conjunctivae normal.   Cardiovascular:      Rate and Rhythm: Normal rate and regular rhythm.   Pulmonary:      Effort: Pulmonary effort is normal. No respiratory distress.   Skin:     General: Skin is warm and dry.   Neurological:      Mental Status: He is alert and oriented to person, place, and time.   Psychiatric:         Mood and Affect: Mood normal.         Behavior: Behavior normal.         Data:  Testosterone improved  Cr and K normal      Bronwyn Brandt MD  Internal Medicine         [1]   Current Outpatient Medications on File Prior to Visit   Medication Sig Dispense Refill    amLODIPine (NORVASC) 5 MG tablet Take 1 tablet by mouth once daily. 90 tablet 2    levocetirizine (XYZAL) 5 MG tablet Take 5 mg by mouth every evening.      potassium chloride (KLOR-CON) 10 MEQ TbSR Take 1 tablet by mouth twice daily. 180 tablet 1    sildenafiL (VIAGRA) 50 MG tablet Take 1 tablet by mouth daily as needed for erectile dysfunction. 30 tablet 9    valsartan (DIOVAN) 320 MG tablet Take 1 tablet by mouth once daily. 90 tablet 1    PROAIR RESPICLICK 90 mcg/actuation inhaler Inhale 2 puffs into the lungs every 6 (six) hours as needed for Wheezing or Shortness of Breath. (Patient not taking: Reported on 7/23/2025) 1 each 1    [DISCONTINUED] sumatriptan (IMITREX) 50 MG tablet Take 1 tablet (50 mg total) by mouth daily as needed for Migraine. 9 tablet 1     No current facility-administered medications on file prior to visit.     "

## 2025-09-05 ENCOUNTER — HOSPITAL ENCOUNTER (OUTPATIENT)
Dept: RADIOLOGY | Facility: HOSPITAL | Age: 45
Discharge: HOME | End: 2025-09-05
Attending: PHYSICIAN ASSISTANT
Payer: COMMERCIAL

## 2025-09-05 DIAGNOSIS — M18.0 PRIMARY OSTEOARTHRITIS OF BOTH FIRST CARPOMETACARPAL JOINTS: ICD-10-CM

## 2025-09-05 PROCEDURE — 73130 X-RAY EXAM OF HAND: CPT | Mod: TC,PO,LT

## 2025-09-05 PROCEDURE — 73130 X-RAY EXAM OF HAND: CPT | Mod: TC,PO,RT

## (undated) DEVICE — FORCEP STRAIGHT DISP

## (undated) DEVICE — Device

## (undated) DEVICE — TOURNIQUET SB QC DP 30X4IN

## (undated) DEVICE — SEE MEDLINE ITEM 157128

## (undated) DEVICE — BNDG COFLEX FOAM LF2 ST 4X5YD

## (undated) DEVICE — DRESSING XEROFORM 1X8IN

## (undated) DEVICE — PADDING CAST SPECIALIST 6X4YD

## (undated) DEVICE — GAUZE SPONGE 4X4 12PLY

## (undated) DEVICE — BANDAGE ACE DOUBLE STER 6IN

## (undated) DEVICE — STRAP OR TABLE 5IN X 72IN

## (undated) DEVICE — DRAPE C ARM 42 X 120 10/BX

## (undated) DEVICE — DRAPE HAND STERILE

## (undated) DEVICE — SUT 0 VICRYL / CT-1

## (undated) DEVICE — ALCOHOL 70% ISOP RUBBING 4OZ

## (undated) DEVICE — BOWL STERILE LARGE 32OZ

## (undated) DEVICE — ELECTRODE REM PLYHSV RETURN 9

## (undated) DEVICE — SPONGE COTTON TRAY 4X4IN

## (undated) DEVICE — HANDLE SURG LIGHT NONRIGID

## (undated) DEVICE — GLOVE PROTEXIS LTX MICRO  7.5

## (undated) DEVICE — APPLICATOR CHLORAPREP ORN 26ML

## (undated) DEVICE — SEE MEDLINE ITEM 152622

## (undated) DEVICE — DRAPE STERI-DRAPE 1000 17X11IN

## (undated) DEVICE — CORD BIPOLAR 12 FOOT

## (undated) DEVICE — TOURNIQUET SB QC DP 18X4IN

## (undated) DEVICE — PAD CAST SPECIALIST STRL 3

## (undated) DEVICE — DRAPE PLASTIC U 60X72

## (undated) DEVICE — BRUSH SCRUB HIBICLENS 4%

## (undated) DEVICE — BANDAGE ELAS SOFTWRAP ST 3X5YD

## (undated) DEVICE — MARKER SKIN RULER STERILE

## (undated) DEVICE — NDL 27G X 1 1/4

## (undated) DEVICE — SEE L#120831

## (undated) DEVICE — SEE MEDLINE ITEM 157131

## (undated) DEVICE — GLOVE PROTEXIS LTX MICRO 8

## (undated) DEVICE — DRESSING XEROFORM FOIL PK 1X8

## (undated) DEVICE — SUT 3-0 VICRYL / SH (J416)

## (undated) DEVICE — SYR 10CC LUER LOCK

## (undated) DEVICE — SPLINT PLASTER FAST SET 5X30IN

## (undated) DEVICE — TEAR CROSS LRG 14MM X 7MM

## (undated) DEVICE — BAND RUBBER STERILE 1/4X3.5IN

## (undated) DEVICE — SUT ETHILON 3-0 PS2 18 BLK

## (undated) DEVICE — TOWEL OR DISP STRL BLUE 4/PK

## (undated) DEVICE — BANDAGE ESMARK LATEX FREE 4INX

## (undated) DEVICE — SEE MEDLINE ITEM 146313

## (undated) DEVICE — PENCIL ROCKER SWITCH 10FT CORD

## (undated) DEVICE — KIT SAHARA DRAPE DRAW/LIFT

## (undated) DEVICE — DRAPE C-ARMOR EQUIPMENT COVER

## (undated) DEVICE — DRAPE THREE-QTR REINF 53X77IN

## (undated) DEVICE — SPLINT PLASTER FS 5IN X 30IN

## (undated) DEVICE — APPLICATOR CHLORAPREP CLR 10.5

## (undated) DEVICE — BLADE SURG #15 CARBON STEEL

## (undated) DEVICE — STRIP MEDI WND CLSR 1/2X4IN

## (undated) DEVICE — TUBING SUC UNIV W/CONN 12FT

## (undated) DEVICE — SEE MEDLINE ITEM 152487

## (undated) DEVICE — GLOVE BIOGEL PI MICRO SZ 7.5

## (undated) DEVICE — UNDERGLOVES BIOGEL PI SIZE 7.5

## (undated) DEVICE — SEE MEDLINE ITEM 157173

## (undated) DEVICE — GLOVE SENSICARE PI ALOE 8

## (undated) DEVICE — SUT 2-0 VICRYL / SH (J417)

## (undated) DEVICE — SUT MONOCRYL 3-0 SH U/D

## (undated) DEVICE — SEE MEDLINE ITEM 152514

## (undated) DEVICE — BLADE SAW SM OSC SAGITTAL .39M

## (undated) DEVICE — DRAPE T EXTRM SURG 121X128X90

## (undated) DEVICE — DRAPE U SPLIT SHEET 54X76IN

## (undated) DEVICE — K-WIRE TRCR PT1.25MM D 150MM L
Type: IMPLANTABLE DEVICE | Site: HEEL | Status: NON-FUNCTIONAL
Removed: 2024-11-11

## (undated) DEVICE — SUT CTD VICRYL 0 UND BR

## (undated) DEVICE — DRAPE STERI U-SHAPED 47X51IN

## (undated) DEVICE — DRAPE HALF SURGICAL 40X58IN

## (undated) DEVICE — BANDAGE ESMARK 6X12

## (undated) DEVICE — PAD CAST SPECIALIST STRL 6

## (undated) DEVICE — SUT PROLENE 4-0 MONO 18IN

## (undated) DEVICE — SUT ETHILON 4-0 PS2 18 BLK

## (undated) DEVICE — DRESSING XEROFORM NONADH 1X8IN

## (undated) DEVICE — PAD CAST SPECIALIST STRL 4